# Patient Record
Sex: MALE | Race: WHITE | NOT HISPANIC OR LATINO | Employment: OTHER | URBAN - METROPOLITAN AREA
[De-identification: names, ages, dates, MRNs, and addresses within clinical notes are randomized per-mention and may not be internally consistent; named-entity substitution may affect disease eponyms.]

---

## 2017-01-10 ENCOUNTER — HOSPITAL ENCOUNTER (OUTPATIENT)
Facility: MEDICAL CENTER | Age: 82
End: 2017-01-10
Attending: UROLOGY
Payer: COMMERCIAL

## 2017-01-10 PROCEDURE — 87077 CULTURE AEROBIC IDENTIFY: CPT

## 2017-01-10 PROCEDURE — 87086 URINE CULTURE/COLONY COUNT: CPT

## 2017-01-10 PROCEDURE — 87186 SC STD MICRODIL/AGAR DIL: CPT

## 2017-01-13 LAB
BACTERIA UR CULT: ABNORMAL
SIGNIFICANT IND 70042: ABNORMAL
SOURCE SOURCE: ABNORMAL

## 2017-01-25 ENCOUNTER — HOSPITAL ENCOUNTER (OUTPATIENT)
Facility: MEDICAL CENTER | Age: 82
End: 2017-01-25
Attending: UROLOGY
Payer: COMMERCIAL

## 2017-01-25 PROCEDURE — 87077 CULTURE AEROBIC IDENTIFY: CPT

## 2017-01-25 PROCEDURE — 87086 URINE CULTURE/COLONY COUNT: CPT

## 2017-01-25 PROCEDURE — 87186 SC STD MICRODIL/AGAR DIL: CPT

## 2017-01-27 LAB
BACTERIA UR CULT: ABNORMAL
SIGNIFICANT IND 70042: ABNORMAL
SOURCE SOURCE: ABNORMAL

## 2017-04-26 ENCOUNTER — HOSPITAL ENCOUNTER (OUTPATIENT)
Facility: MEDICAL CENTER | Age: 82
End: 2017-04-26
Attending: NURSE PRACTITIONER
Payer: COMMERCIAL

## 2017-04-26 PROCEDURE — 87077 CULTURE AEROBIC IDENTIFY: CPT

## 2017-04-26 PROCEDURE — 87086 URINE CULTURE/COLONY COUNT: CPT

## 2017-04-26 PROCEDURE — 87186 SC STD MICRODIL/AGAR DIL: CPT

## 2017-04-28 LAB
BACTERIA UR CULT: ABNORMAL
BACTERIA UR CULT: ABNORMAL
SIGNIFICANT IND 70042: ABNORMAL
SOURCE SOURCE: ABNORMAL

## 2017-05-17 ENCOUNTER — HOSPITAL ENCOUNTER (OUTPATIENT)
Dept: LAB | Facility: MEDICAL CENTER | Age: 82
End: 2017-05-17
Attending: NURSE PRACTITIONER
Payer: COMMERCIAL

## 2017-05-17 PROCEDURE — 87077 CULTURE AEROBIC IDENTIFY: CPT

## 2017-05-17 PROCEDURE — 87086 URINE CULTURE/COLONY COUNT: CPT

## 2017-05-17 PROCEDURE — 87186 SC STD MICRODIL/AGAR DIL: CPT

## 2017-05-19 LAB
BACTERIA UR CULT: ABNORMAL
SIGNIFICANT IND 70042: ABNORMAL
SITE SITE: ABNORMAL
SOURCE SOURCE: ABNORMAL

## 2017-05-22 ENCOUNTER — HOSPITAL ENCOUNTER (OUTPATIENT)
Dept: LAB | Facility: MEDICAL CENTER | Age: 82
End: 2017-05-22
Attending: NURSE PRACTITIONER
Payer: COMMERCIAL

## 2017-05-22 PROCEDURE — 87086 URINE CULTURE/COLONY COUNT: CPT

## 2017-05-22 PROCEDURE — 87077 CULTURE AEROBIC IDENTIFY: CPT

## 2017-05-22 PROCEDURE — 87186 SC STD MICRODIL/AGAR DIL: CPT

## 2017-06-01 ENCOUNTER — HOSPITAL ENCOUNTER (OUTPATIENT)
Dept: LAB | Facility: MEDICAL CENTER | Age: 82
End: 2017-06-01
Attending: UROLOGY
Payer: COMMERCIAL

## 2017-06-01 PROCEDURE — 87077 CULTURE AEROBIC IDENTIFY: CPT

## 2017-06-01 PROCEDURE — 87186 SC STD MICRODIL/AGAR DIL: CPT

## 2017-06-01 PROCEDURE — 87086 URINE CULTURE/COLONY COUNT: CPT

## 2017-06-23 ENCOUNTER — HOSPITAL ENCOUNTER (OUTPATIENT)
Facility: MEDICAL CENTER | Age: 82
End: 2017-06-23
Attending: UROLOGY
Payer: COMMERCIAL

## 2017-06-23 PROCEDURE — 87077 CULTURE AEROBIC IDENTIFY: CPT

## 2017-06-23 PROCEDURE — 87186 SC STD MICRODIL/AGAR DIL: CPT

## 2017-06-23 PROCEDURE — 87086 URINE CULTURE/COLONY COUNT: CPT

## 2017-10-17 ENCOUNTER — PATIENT OUTREACH (OUTPATIENT)
Dept: HEALTH INFORMATION MANAGEMENT | Facility: OTHER | Age: 82
End: 2017-10-17

## 2017-10-17 NOTE — PROGRESS NOTES
Attempt #:2    WebIZ Checked & Epic Updated: Yes  · WebIZ Recommendations: FLU and ZOSTAVAX (Shingles)  · Is patient due for Tdap? NO  · Is patient due for Shingles? YES. Patient was not notified of copay/out of pocket cost.  HealthConnect Verified: yes  Verify PCP: yes    Communication Preference Obtained: yes     Review Care Team: yes    Annual Wellness Visit Scheduling  1. Scheduling Status:Scheduled        Care Gap Scheduling (Attempt to Schedule EACH Overdue Care Gap!)     Health Maintenance Due   Topic Date Due   • IMM ZOSTER VACCINE  10/23/1984   • IMM INFLUENZA (1) 09/01/2017        Scheduled patient for Annual Wellness Visit and Immunizations: FLU and ZOSTAVAX (Shingles)       MyChart Activation: declined  [employer] Dayami: no  Virtual Visits: no  Opt In to Text Messages: no

## 2017-10-18 ENCOUNTER — TELEPHONE (OUTPATIENT)
Dept: MEDICAL GROUP | Facility: MEDICAL CENTER | Age: 82
End: 2017-10-18

## 2017-10-18 NOTE — TELEPHONE ENCOUNTER
Future Appointments       Provider Department Center    10/25/2017 8:20 AM Tye Pan M.D.; [facility] [address] [facility]          ANNUAL WELLNESS VISIT PRE-VISIT PLANNING     1.  Reviewed note from last office visit with PCP: YES Visit date: 10/27/16    2.  If any orders were placed at last visit, do we have Results/Consult Notes?        •  Labs - Labs ordered, NOT completed. Patient advised to complete prior to next appointment.       •  Imaging - Imaging was not ordered at last office visit.        •  Referrals - No referrals were ordered at last office visit.     3.  Immunizations were updated in mVisum using WebIZ?: Yes       •  WebIZ Recommendations: FLU and ZOSTAVAX (Shingles)       •  Is patient due for Tdap? NO       •  Is patient due for Shingles? YES. Patient was notified of copay/out of pocket cost.     4.  Patient is due for the following Health Maintenance Topics:   Health Maintenance Due   Topic Date Due   • IMM ZOSTER VACCINE  10/23/1984   • IMM INFLUENZA (1) 09/01/2017       5.  Reviewed/Updated the following with patient:       •   Preferred Pharmacy? YES       •   Preferred Lab? YES       •   Preferred Communication? YES       •   Allergies? YES       •   Medications? YES. Was Abstract Encounter opened and chart updated? YES       •   Social History? YES. Was Abstract Encounter opened and chart updated? YES       •   Family History (document living status of immediate family members and if + hx of cancer, diabetes, hypertension, hyperlipidemia, heart attack, stroke) YES. Was Abstract Encounter opened and chart updated? YES    6.  Care Team Updated:       •   DME Company (gait device, O2, CPAP, etc.): YES       •   Other Specialists (eye doctor, derm, GYN, cardiology, endo, etc): YES       •   Last eye exam: a while    7. Patient has the following Care Path diagnoses on Problem List:  NONE    8.  Is patient in need of any refills prior to office visit? No    9.  DPA/Advanced Directive:  Patient does not have an Advanced Directive.  A packet and workshop information was given on Advanced Directives.     10.  Patient was advised: “This is a free wellness visit. The provider will screen for medical conditions to help you stay healthy. If you have other concerns to address you may be asked to discuss these at a separate visit or there may be an additional fee.”     11.  Patient was informed to arrive 15 min prior to their scheduled appointment and bring in their medication bottles?  YES

## 2017-10-25 ENCOUNTER — OFFICE VISIT (OUTPATIENT)
Dept: MEDICAL GROUP | Facility: MEDICAL CENTER | Age: 82
End: 2017-10-25
Payer: COMMERCIAL

## 2017-10-25 VITALS
TEMPERATURE: 97.7 F | WEIGHT: 152 LBS | RESPIRATION RATE: 16 BRPM | HEART RATE: 84 BPM | OXYGEN SATURATION: 96 % | SYSTOLIC BLOOD PRESSURE: 146 MMHG | DIASTOLIC BLOOD PRESSURE: 74 MMHG | BODY MASS INDEX: 21.76 KG/M2 | HEIGHT: 70 IN

## 2017-10-25 DIAGNOSIS — N40.1 BENIGN PROSTATIC HYPERPLASIA WITH URINARY FREQUENCY: ICD-10-CM

## 2017-10-25 DIAGNOSIS — D64.9 NORMOCYTIC ANEMIA: ICD-10-CM

## 2017-10-25 DIAGNOSIS — R35.0 BENIGN PROSTATIC HYPERPLASIA WITH URINARY FREQUENCY: ICD-10-CM

## 2017-10-25 DIAGNOSIS — I48.0 PAF (PAROXYSMAL ATRIAL FIBRILLATION) (HCC): ICD-10-CM

## 2017-10-25 DIAGNOSIS — Z86.73 HISTORY OF STROKE: ICD-10-CM

## 2017-10-25 DIAGNOSIS — Z00.00 MEDICARE ANNUAL WELLNESS VISIT, SUBSEQUENT: ICD-10-CM

## 2017-10-25 DIAGNOSIS — N63.0 BREAST SWELLING: ICD-10-CM

## 2017-10-25 DIAGNOSIS — N39.0 RECURRENT UTI: ICD-10-CM

## 2017-10-25 DIAGNOSIS — H35.30 MACULAR DEGENERATION: ICD-10-CM

## 2017-10-25 PROCEDURE — 99213 OFFICE O/P EST LOW 20 MIN: CPT | Mod: 25 | Performed by: INTERNAL MEDICINE

## 2017-10-25 PROCEDURE — G0439 PPPS, SUBSEQ VISIT: HCPCS | Mod: 25 | Performed by: INTERNAL MEDICINE

## 2017-10-25 ASSESSMENT — PATIENT HEALTH QUESTIONNAIRE - PHQ9: CLINICAL INTERPRETATION OF PHQ2 SCORE: 0

## 2017-10-25 ASSESSMENT — PAIN SCALES - GENERAL: PAINLEVEL: NO PAIN

## 2017-10-25 NOTE — PROGRESS NOTES
CC: Wellness examination complaining of breast swelling.    HPI:   Alistair presents today with the following.    1. Medicare annual wellness visit, subsequent  Screenings performed below information given on advance directives    2. Breast swelling  Does have a new complaint of right breast swelling for the last several months. No discharge and no pain. He is taking finasteride for BPH.    3. PAF (paroxysmal atrial fibrillation) (CMS-HCC)  He does have atrial fibrillation and persistently regular on his heart rate. He does have a history of a stroke and has refused any anticoagulation is no longer seeing cardiology.    4. History of stroke  He is taking aspirin daily again refuses Coumadin    5. Benign prostatic hyperplasia with urinary frequency  Followed by urology denying any new symptoms.    6. Macular degeneration  Followed by urology    7. Recurrent UTI  Now self-catheterization with decreased frequency of urinary tract infection seen by urology frequently.    8. Normocytic anemia  Overdue for blood work. Denying any blood in stool or dark tarry stool.      Depression Screening    Little interest or pleasure in doing things?  0 - not at all  Feeling down, depressed , or hopeless? 0 - not at all  Patient Health Questionnaire Score: 0     If depressive symptoms identified deferred to follow up visit unless specifically addressed in assessment and plan.    Interpretation of PHQ-9 Total Score   Score Severity   1-4 No Depression   5-9 Mild Depression   10-14 Moderate Depression   15-19 Moderately Severe Depression   20-27 Severe Depression    Screening for Cognitive Impairment    Three Minute Recall (apple, watch, flavia)  3/3    Draw clock face with all 12 numbers set to the hand to show 10 minutes past 11 o'clock  1 4/5  Cognitive concerns identified deferred for follow up unless specifically addressed in assessment and plan.    Fall Risk Assessment    Has the patient had two or more falls in the last year or any  fall with injury in the last year?  No    Safety Assessment    Throw rugs on floor.  No  Handrails on all stairs.  Yes  Good lighting in all hallways.  Yes  Difficulty hearing.  Yes  Patient counseled about all safety risks that were identified.    Functional Assessment ADLs    Are there any barriers preventing you from cooking for yourself or meeting nutritional needs?  No.    Are there any barriers preventing you from driving safely or obtaining transportation?  No.    Are there any barriers preventing you from using a telephone or calling for help?  No.    Are there any barriers preventing you from shopping?  No.    Are there any barriers preventing you from taking care of your own finances?  Yes. Wife does finaces  Are there any barriers preventing you from managing your medications?  Yes. Wife does medications  Are currently engaging any exercise or physical activity?  Yes.       Health Maintenance Summary                Annual Wellness Visit Overdue 10/23/[birth year]24     IMM INFLUENZA Overdue 9/1/2017     IMM DTaP/Tdap/Td Vaccine Next Due 8/23/2021      Done 8/23/2011 Imm Admin: Tdap Vaccine          Patient Care Team:  Tye Pan M.D. as PCP - General (Internal Medicine)  Bucky Bustamante M.D. 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     • Multiple Vitamins-Minerals ([employer] SPECTRAVITE PO) Take 1 tablet by mouth every  "day at 6 PM.     • Lactobacillus Rhamnosus, GG, ([employer]S PROBIOTIC, LACTOBACILLUS,) Cap Take 1 Cap by mouth every day at 6 PM.     • aspirin EC (ECOTRIN) 325 MG Tablet Delayed Response Take 325 mg by mouth every day.     • finasteride (PROSCAR) 5 MG Tab Take 5 mg by mouth every evening.     • CALCIUM-MAGNESIUM-ZINC PO Take 1 Tab by mouth every day.     • VITAMIN D PO Take 1 Tab by mouth every day. 1000 IU     • B Complex Vitamins (VITAMIN-B COMPLEX PO) Take 1 Tab by mouth every day.     • BETA CAROTENE PO Take 1 Tab by mouth every day. 15mg cap       No current facility-administered medications for this visit.          Allergies as of 10/25/2017 - Reviewed 10/25/2017   Allergen Reaction Noted   • Bloodless Unspecified 10/14/2009   • Coufarin [warfarin] Unspecified 05/15/2016   • Flomax [tamsulosin] Unspecified 05/15/2016   • Heparin Hives, Rash, and Itching 05/05/2015   • Lipitor [atorvastatin] Rash 04/21/2015   • Macrobid [kdc:red dye+yellow dye+nitrofurantoin+brilliant blue fcf] Nausea 04/07/2016        ROS: As per HPI.    /74   Pulse 84   Temp 36.5 °C (97.7 °F)   Resp 16   Ht 1.778 m (5' 10\")   Wt 68.9 kg (152 lb)   SpO2 96%   BMI 21.81 kg/m²     Physical Exam:  Gen:         Alert and oriented, No apparent distress.  Neck:        No Lymphadenopathy or Bruits.  Lungs:     Clear to auscultation bilaterally  CV:Irregularly irregular rhythm. No murmurs, rubs or gallops.  Abd:         Soft non tender, non distended. Normal active bowel sounds.  No  Hepatosplenomegaly, No pulsatile masses.                   Ext:          No clubbing, cyanosis, edema.      Assessment and Plan.   89 y.o. male with the following issues.    1. Medicare annual wellness visit, subsequent  Discussed healthy lifestyle habits as well as screening regimens.  - Annual Wellness Visit - Includes PPPS Subsequent ()    2. Breast swelling  Discussion today slight fullness under the areola of the right breast likely related to medication. " Suggested ultrasound patient declines will call back if it worsens.    3. PAF (paroxysmal atrial fibrillation) (CMS-HCC)  Referring back to cardiology given his history of stroke and persistent irregularities of heart rate. Denies any chest pains or shortness of breath no edema. Does not have any sensation of palpitations.  - Annual Wellness Visit - Includes PPPS Subsequent ()  - REFERRAL TO CARDIOLOGY    4. History of stroke  Continue aspirin again back to cardiology  - Annual Wellness Visit - Includes PPPS Subsequent ()    5. Benign prostatic hyperplasia with urinary frequency  Currently stable follow with urology  - Annual Wellness Visit - Includes PPPS Subsequent ()    6. Macular degeneration  Follow with ophthalmology  - Annual Wellness Visit - Includes PPPS Subsequent ()    7. Recurrent UTI  Doing much better follow along with a urologist  - Annual Wellness Visit - Includes PPPS Subsequent ()    8. Normocytic anemia  Rechecking blood work.  - Annual Wellness Visit - Includes PPPS Subsequent ()  - COMP METABOLIC PANEL; Future  - CBC WITH DIFFERENTIAL; Future

## 2017-10-26 ENCOUNTER — HOSPITAL ENCOUNTER (OUTPATIENT)
Dept: LAB | Facility: MEDICAL CENTER | Age: 82
End: 2017-10-26
Attending: INTERNAL MEDICINE
Payer: COMMERCIAL

## 2017-10-26 DIAGNOSIS — D64.9 NORMOCYTIC ANEMIA: ICD-10-CM

## 2017-10-26 LAB
ALBUMIN SERPL BCP-MCNC: 4 G/DL (ref 3.2–4.9)
ALBUMIN/GLOB SERPL: 1.4 G/DL
ALP SERPL-CCNC: 77 U/L (ref 30–99)
ALT SERPL-CCNC: 12 U/L (ref 2–50)
ANION GAP SERPL CALC-SCNC: 3 MMOL/L (ref 0–11.9)
AST SERPL-CCNC: 22 U/L (ref 12–45)
BASOPHILS # BLD AUTO: 1.3 % (ref 0–1.8)
BASOPHILS # BLD: 0.09 K/UL (ref 0–0.12)
BILIRUB SERPL-MCNC: 0.9 MG/DL (ref 0.1–1.5)
BUN SERPL-MCNC: 15 MG/DL (ref 8–22)
CALCIUM SERPL-MCNC: 9.1 MG/DL (ref 8.5–10.5)
CHLORIDE SERPL-SCNC: 106 MMOL/L (ref 96–112)
CO2 SERPL-SCNC: 29 MMOL/L (ref 20–33)
CREAT SERPL-MCNC: 0.9 MG/DL (ref 0.5–1.4)
EOSINOPHIL # BLD AUTO: 0.78 K/UL (ref 0–0.51)
EOSINOPHIL NFR BLD: 11.6 % (ref 0–6.9)
ERYTHROCYTE [DISTWIDTH] IN BLOOD BY AUTOMATED COUNT: 45.8 FL (ref 35.9–50)
GFR SERPL CREATININE-BSD FRML MDRD: >60 ML/MIN/1.73 M 2
GLOBULIN SER CALC-MCNC: 2.8 G/DL (ref 1.9–3.5)
GLUCOSE SERPL-MCNC: 93 MG/DL (ref 65–99)
HCT VFR BLD AUTO: 43.2 % (ref 42–52)
HGB BLD-MCNC: 14.1 G/DL (ref 14–18)
IMM GRANULOCYTES # BLD AUTO: 0.03 K/UL (ref 0–0.11)
IMM GRANULOCYTES NFR BLD AUTO: 0.4 % (ref 0–0.9)
LYMPHOCYTES # BLD AUTO: 1.5 K/UL (ref 1–4.8)
LYMPHOCYTES NFR BLD: 22.4 % (ref 22–41)
MCH RBC QN AUTO: 31.4 PG (ref 27–33)
MCHC RBC AUTO-ENTMCNC: 32.6 G/DL (ref 33.7–35.3)
MCV RBC AUTO: 96.2 FL (ref 81.4–97.8)
MONOCYTES # BLD AUTO: 0.89 K/UL (ref 0–0.85)
MONOCYTES NFR BLD AUTO: 13.3 % (ref 0–13.4)
NEUTROPHILS # BLD AUTO: 3.42 K/UL (ref 1.82–7.42)
NEUTROPHILS NFR BLD: 51 % (ref 44–72)
NRBC # BLD AUTO: 0 K/UL
NRBC BLD AUTO-RTO: 0 /100 WBC
PLATELET # BLD AUTO: 235 K/UL (ref 164–446)
PMV BLD AUTO: 10.5 FL (ref 9–12.9)
POTASSIUM SERPL-SCNC: 4.5 MMOL/L (ref 3.6–5.5)
PROT SERPL-MCNC: 6.8 G/DL (ref 6–8.2)
RBC # BLD AUTO: 4.49 M/UL (ref 4.7–6.1)
SODIUM SERPL-SCNC: 138 MMOL/L (ref 135–145)
WBC # BLD AUTO: 6.7 K/UL (ref 4.8–10.8)

## 2017-10-26 PROCEDURE — 85025 COMPLETE CBC W/AUTO DIFF WBC: CPT

## 2017-10-26 PROCEDURE — 36415 COLL VENOUS BLD VENIPUNCTURE: CPT

## 2017-10-26 PROCEDURE — 80053 COMPREHEN METABOLIC PANEL: CPT

## 2017-11-09 ENCOUNTER — OFFICE VISIT (OUTPATIENT)
Dept: CARDIOLOGY | Facility: MEDICAL CENTER | Age: 82
End: 2017-11-09
Payer: COMMERCIAL

## 2017-11-09 VITALS
DIASTOLIC BLOOD PRESSURE: 76 MMHG | WEIGHT: 156 LBS | HEIGHT: 70 IN | SYSTOLIC BLOOD PRESSURE: 134 MMHG | HEART RATE: 64 BPM | OXYGEN SATURATION: 95 % | BODY MASS INDEX: 22.33 KG/M2

## 2017-11-09 DIAGNOSIS — D64.9 NORMOCYTIC ANEMIA: ICD-10-CM

## 2017-11-09 DIAGNOSIS — N35.010 POST-TRAUMATIC MALE URETHRAL MEATAL STRICTURE: ICD-10-CM

## 2017-11-09 DIAGNOSIS — H35.30 MACULAR DEGENERATION: ICD-10-CM

## 2017-11-09 DIAGNOSIS — I48.0 PAF (PAROXYSMAL ATRIAL FIBRILLATION) (HCC): ICD-10-CM

## 2017-11-09 LAB — EKG IMPRESSION: NORMAL

## 2017-11-09 PROCEDURE — 93000 ELECTROCARDIOGRAM COMPLETE: CPT | Performed by: INTERNAL MEDICINE

## 2017-11-09 PROCEDURE — 99214 OFFICE O/P EST MOD 30 MIN: CPT | Performed by: INTERNAL MEDICINE

## 2017-11-09 ASSESSMENT — ENCOUNTER SYMPTOMS
PND: 0
PALPITATIONS: 0
MUSCULOSKELETAL NEGATIVE: 1
FEVER: 0
ORTHOPNEA: 0
NEUROLOGICAL NEGATIVE: 1
WEAKNESS: 0
SORE THROAT: 0
SHORTNESS OF BREATH: 0
CLAUDICATION: 0
EYES NEGATIVE: 1
RESPIRATORY NEGATIVE: 1
SPUTUM PRODUCTION: 0
DIZZINESS: 0
HEMOPTYSIS: 0
STRIDOR: 0
CONSTITUTIONAL NEGATIVE: 1
BRUISES/BLEEDS EASILY: 0
WHEEZING: 0
COUGH: 0
GASTROINTESTINAL NEGATIVE: 1
CHILLS: 0
CARDIOVASCULAR NEGATIVE: 1
LOSS OF CONSCIOUSNESS: 0

## 2017-11-09 NOTE — LETTER
[facility]   [address], [address]  [place] [address]  Phone: [phone]  Fax: [phone]              Alistair Tavares  10/23/1924    Encounter Date: 11/9/2017    Americo Mckeon M.D.          PROGRESS NOTE:  Subjective:   Alistair Tavares is a 89 y.o. male who presents today as a new consultation. He is a patient well-known to our practice but has not been here in greater than 2 years. He has a past mental history of paroxysmal atrial fibrillation and has declined anticoagulation in the past. He was present flexion is no longer taking that. He otherwise has no functional limitations and walks per day with his significant other. He has no functional limitations.    Past Medical History:   Diagnosis Date   • Angina 2005    episode of atrial fibrillation   • Arrhythmia     History of afib, no longer on medication   • CAD (coronary artery disease)     paroximal atrial tachycardia   • CATARACT     asim iol   • Dental disorder     lower dentures   • Macular degeneration of both eyes    • Pneumonia 2008    Not hospitalized for it   • Renal disorder 8/4/2012    pyelonephritis   • Stroke (CMS-[facility]) 2015    pt reports no residual weakness   • Unspecified urinary incontinence     dribbles     Past Surgical History:   Procedure Laterality Date   • CYSTOSCOPY  6/13/2016    Procedure: CYSTOSCOPY 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;  Surgeon: Bucky Bustamante M.D.;  Location: SURGERY [facility];  Service:    • CATARACT PHACO WITH IOL  10/28/2009    Performed by PAULA COE at SURGERY SAME DAY [facility] ORS   • CATARACT PHACO WITH IOL  10/14/2009    Performed by PAULA COE at SURGERY SAME DAY [facility] ORS   • OTHER ORTHOPEDIC SURGERY      PATELLA RIGHT     Family History   Problem Relation Age of Onset   • Lung Disease Father      emphysima   • Arthritis Neg Hx      History   Smoking Status   • Never Smoker   Smokeless Tobacco   •  "Never Used     Allergies   Allergen Reactions   • Bloodless Unspecified     Pt states he is willing to accept blood/blood products as \"a last resort\"   • Coufarin [Warfarin] Unspecified     Pts wife states \"He just does not like that drug\".   • Flomax [Tamsulosin] Unspecified     Pts wife states \"His BP goes really low and he fell down\"   • Heparin Hives, Rash and Itching     Pts wife states \"Rash all over body\".   • Lipitor [Atorvastatin] Rash     Pts wife states \"Rash all over body\".   • Macrobid [Kdc:Red Dye+Yellow Dye+Nitrofurantoin+Brilliant Blue Fcf] Nausea     Pt's wife states \"Makes pt weak and nausea\".     Outpatient Encounter Prescriptions as of 11/9/2017   Medication Sig Dispense Refill   • Cyanocobalamin (VITAMIN B-12 PO) Take  by mouth.     • Multiple Vitamins-Minerals (CVS SPECTRAVITE PO) Take 1 tablet by mouth every day at 6 PM.     • Lactobacillus Rhamnosus, GG, (CVS PROBIOTIC, LACTOBACILLUS,) Cap Take 1 Cap by mouth every day at 6 PM.     • aspirin EC (ECOTRIN) 325 MG Tablet Delayed Response Take 325 mg by mouth every day.     • finasteride (PROSCAR) 5 MG Tab Take 5 mg by mouth every evening.     • CALCIUM-MAGNESIUM-ZINC PO Take 1 Tab by mouth every day.     • VITAMIN D PO Take 1 Tab by mouth every day. 1000 IU     • B Complex Vitamins (VITAMIN-B COMPLEX PO) Take 1 Tab by mouth every day.     • [DISCONTINUED] BETA CAROTENE PO Take 1 Tab by mouth every day. 15mg cap       No facility-administered encounter medications on file as of 11/9/2017.      Review of Systems   Constitutional: Negative.  Negative for chills, fever and malaise/fatigue.   HENT: Negative.  Negative for sore throat.    Eyes: Negative.    Respiratory: Negative.  Negative for cough, hemoptysis, sputum production, shortness of breath, wheezing and stridor.    Cardiovascular: Negative.  Negative for chest pain, palpitations, orthopnea, claudication, leg swelling and PND.   Gastrointestinal: Negative.    Genitourinary: Negative.    " "  Musculoskeletal: Negative.    Skin: Negative.    Neurological: Negative.  Negative for dizziness, loss of consciousness and weakness.   Endo/Heme/Allergies: Negative.  Does not bruise/bleed easily.   All other systems reviewed and are negative.       Objective:   /76   Pulse 64   Ht 1.778 m (5' 10\")   Wt 70.8 kg (156 lb)   SpO2 95%   BMI 22.38 kg/m²      Physical Exam   Constitutional: He is oriented to person, place, and time. He appears well-developed and well-nourished. No distress.   HENT:   Head: Normocephalic.   Mouth/Throat: Oropharynx is clear and moist.   Eyes: EOM are normal. Pupils are equal, round, and reactive to light. Right eye exhibits no discharge. Left eye exhibits no discharge. No scleral icterus.   Neck: Normal range of motion. Neck supple. No JVD present. No tracheal deviation present.   Cardiovascular: Normal rate, regular rhythm, S1 normal, S2 normal, normal heart sounds, intact distal pulses and normal pulses.  Exam reveals no gallop, no S3, no S4 and no friction rub.    No murmur heard.   No systolic murmur is present    No diastolic murmur is present   Pulses:       Carotid pulses are 2+ on the right side, and 2+ on the left side.       Radial pulses are 2+ on the right side, and 2+ on the left side.        Dorsalis pedis pulses are 2+ on the right side, and 2+ on the left side.        Posterior tibial pulses are 2+ on the right side, and 2+ on the left side.   Pulmonary/Chest: Effort normal and breath sounds normal. No respiratory distress. He has no wheezes. He has no rales.   Abdominal: Soft. Bowel sounds are normal. He exhibits no distension and no mass. There is no tenderness. There is no rebound and no guarding.   Musculoskeletal: He exhibits no edema.   Neurological: He is alert and oriented to person, place, and time. No cranial nerve deficit.   Skin: Skin is warm and dry. He is not diaphoretic. No pallor.   Psychiatric: He has a normal mood and affect. His behavior is " normal. Judgment and thought content normal.   Nursing note and vitals reviewed.      Assessment:     1. Macular degeneration     2. Normocytic anemia  [facility] EKG (Clinic Performed)   3. PAF (paroxysmal atrial fibrillation) (CMS-[facility])  [facility] EKG (Clinic Performed)   4. 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 His EKG did today in clinic verify this. I think this is likely  Not reversible at this point. I discussed with him the role of oral anticoagulation. He again declines this today. His estimated risk of stroke is approximate 7-10% per year with an annual risk of bleeding of about 1-2%. These risks were explained to him and he again declines anticoagulation.    Thank for you allowing me to take part in your patient's care, please call should you have any questions or would like to discuss this patient.      Tye Pan M.D.  [address] [place] [id number]  VIA In Basket

## 2017-11-09 NOTE — PROGRESS NOTES
"Subjective:   Alistair Tavares is a 89 y.o. male who presents today as a new consultation. He is a patient well-known to our practice but has not been here in greater than 2 years. He has a past mental history of paroxysmal atrial fibrillation and has declined anticoagulation in the past. He was present flexion is no longer taking that. He otherwise has no functional limitations and walks per day with his significant other. He has no functional limitations.    Past Medical History:   Diagnosis Date   • Angina 2005    episode of atrial fibrillation   • Arrhythmia     History of afib, no longer on medication   • CAD (coronary artery disease)     paroximal atrial tachycardia   • CATARACT     asim iol   • Dental disorder     lower dentures   • Macular degeneration of both eyes    • Pneumonia 2008    Not hospitalized for it   • Renal disorder 8/4/2012    pyelonephritis   • Stroke (CMS-[facility]) 2015    pt reports no residual weakness   • Unspecified urinary incontinence     dribbles     Past Surgical History:   Procedure Laterality Date   • CYSTOSCOPY  6/13/2016    Procedure: CYSTOSCOPY 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;  Surgeon: Bucky Bustamante M.D.;  Location: SURGERY [facility];  Service:    • CATARACT PHACO WITH IOL  10/28/2009    Performed by PAULA COE at SURGERY SAME DAY [facility] ORS   • CATARACT PHACO WITH IOL  10/14/2009    Performed by PAULA COE at SURGERY SAME DAY [facility] ORS   • OTHER ORTHOPEDIC SURGERY      PATELLA RIGHT     Family History   Problem Relation Age of Onset   • Lung Disease Father      emphysima   • Arthritis Neg Hx      History   Smoking Status   • Never Smoker   Smokeless Tobacco   • Never Used     Allergies   Allergen Reactions   • Bloodless Unspecified     Pt states he is willing to accept blood/blood products as \"a last resort\"   • Coufarin [Warfarin] Unspecified     Pts wife states \"He just does not like that drug\".   • Flomax [Tamsulosin] " "Unspecified     Pts wife states \"His BP goes really low and he fell down\"   • Heparin Hives, Rash and Itching     Pts wife states \"Rash all over body\".   • Lipitor [Atorvastatin] Rash     Pts wife states \"Rash all over body\".   • Macrobid [Kdc:Red Dye+Yellow Dye+Nitrofurantoin+Brilliant Blue Fcf] Nausea     Pt's wife states \"Makes pt weak and nausea\".     Outpatient Encounter Prescriptions as of 11/9/2017   Medication Sig Dispense Refill   • Cyanocobalamin (VITAMIN B-12 PO) Take  by mouth.     • Multiple Vitamins-Minerals ([employer] SPECTRAVITE PO) Take 1 tablet by mouth every day at 6 PM.     • Lactobacillus Rhamnosus, GG, (CVS PROBIOTIC, LACTOBACILLUS,) Cap Take 1 Cap by mouth every day at 6 PM.     • aspirin EC (ECOTRIN) 325 MG Tablet Delayed Response Take 325 mg by mouth every day.     • finasteride (PROSCAR) 5 MG Tab Take 5 mg by mouth every evening.     • CALCIUM-MAGNESIUM-ZINC PO Take 1 Tab by mouth every day.     • VITAMIN D PO Take 1 Tab by mouth every day. 1000 IU     • B Complex Vitamins (VITAMIN-B COMPLEX PO) Take 1 Tab by mouth every day.     • [DISCONTINUED] BETA CAROTENE PO Take 1 Tab by mouth every day. 15mg cap       No facility-administered encounter medications on file as of 11/9/2017.      Review of Systems   Constitutional: Negative.  Negative for chills, fever and malaise/fatigue.   HENT: Negative.  Negative for sore throat.    Eyes: Negative.    Respiratory: Negative.  Negative for cough, hemoptysis, sputum production, shortness of breath, wheezing and stridor.    Cardiovascular: Negative.  Negative for chest pain, palpitations, orthopnea, claudication, leg swelling and PND.   Gastrointestinal: Negative.    Genitourinary: Negative.    Musculoskeletal: Negative.    Skin: Negative.    Neurological: Negative.  Negative for dizziness, loss of consciousness and weakness.   Endo/Heme/Allergies: Negative.  Does not bruise/bleed easily.   All other systems reviewed and are negative.       Objective:   BP " "134/76   Pulse 64   Ht 1.778 m (5' 10\")   Wt 70.8 kg (156 lb)   SpO2 95%   BMI 22.38 kg/m²     Physical Exam   Constitutional: He is oriented to person, place, and time. He appears well-developed and well-nourished. No distress.   HENT:   Head: Normocephalic.   Mouth/Throat: Oropharynx is clear and moist.   Eyes: EOM are normal. Pupils are equal, round, and reactive to light. Right eye exhibits no discharge. Left eye exhibits no discharge. No scleral icterus.   Neck: Normal range of motion. Neck supple. No JVD present. No tracheal deviation present.   Cardiovascular: Normal rate, regular rhythm, S1 normal, S2 normal, normal heart sounds, intact distal pulses and normal pulses.  Exam reveals no gallop, no S3, no S4 and no friction rub.    No murmur heard.   No systolic murmur is present    No diastolic murmur is present   Pulses:       Carotid pulses are 2+ on the right side, and 2+ on the left side.       Radial pulses are 2+ on the right side, and 2+ on the left side.        Dorsalis pedis pulses are 2+ on the right side, and 2+ on the left side.        Posterior tibial pulses are 2+ on the right side, and 2+ on the left side.   Pulmonary/Chest: Effort normal and breath sounds normal. No respiratory distress. He has no wheezes. He has no rales.   Abdominal: Soft. Bowel sounds are normal. He exhibits no distension and no mass. There is no tenderness. There is no rebound and no guarding.   Musculoskeletal: He exhibits no edema.   Neurological: He is alert and oriented to person, place, and time. No cranial nerve deficit.   Skin: Skin is warm and dry. He is not diaphoretic. No pallor.   Psychiatric: He has a normal mood and affect. His behavior is normal. Judgment and thought content normal.   Nursing note and vitals reviewed.      Assessment:     1. Macular degeneration     2. Normocytic anemia  [facility] EKG (Clinic Performed)   3. PAF (paroxysmal atrial fibrillation) (CMS-HCC)  [facility] EKG (Clinic " Performed)   4. 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 His EKG did today in clinic verify this. I think this is likely  Not reversible at this point. I discussed with him the role of oral anticoagulation. He again declines this today. His estimated risk of stroke is approximate 7-10% per year with an annual risk of bleeding of about 1-2%. These risks were explained to him and he again declines anticoagulation.    Thank for you allowing me to take part in your patient's care, please call should you have any questions or would like to discuss this patient.

## 2018-02-08 ENCOUNTER — OFFICE VISIT (OUTPATIENT)
Dept: CARDIOLOGY | Facility: MEDICAL CENTER | Age: 83
End: 2018-02-08
Payer: COMMERCIAL

## 2018-02-08 VITALS
HEIGHT: 72 IN | WEIGHT: 153 LBS | BODY MASS INDEX: 20.72 KG/M2 | HEART RATE: 68 BPM | SYSTOLIC BLOOD PRESSURE: 116 MMHG | DIASTOLIC BLOOD PRESSURE: 74 MMHG | OXYGEN SATURATION: 95 %

## 2018-02-08 DIAGNOSIS — N35.010 POST-TRAUMATIC MALE URETHRAL MEATAL STRICTURE: ICD-10-CM

## 2018-02-08 DIAGNOSIS — Z86.73 HISTORY OF STROKE: ICD-10-CM

## 2018-02-08 DIAGNOSIS — I48.0 PAF (PAROXYSMAL ATRIAL FIBRILLATION) (HCC): ICD-10-CM

## 2018-02-08 PROCEDURE — 99214 OFFICE O/P EST MOD 30 MIN: CPT | Performed by: INTERNAL MEDICINE

## 2018-02-08 ASSESSMENT — ENCOUNTER SYMPTOMS
CONSTITUTIONAL NEGATIVE: 1
MUSCULOSKELETAL NEGATIVE: 1
HEMOPTYSIS: 0
WEAKNESS: 0
NEUROLOGICAL NEGATIVE: 1
SPUTUM PRODUCTION: 0
PALPITATIONS: 0
LOSS OF CONSCIOUSNESS: 0
DIZZINESS: 0
CHILLS: 0
COUGH: 0
GASTROINTESTINAL NEGATIVE: 1
CLAUDICATION: 0
BRUISES/BLEEDS EASILY: 0
FEVER: 0
WHEEZING: 0
STRIDOR: 0
PND: 0
EYES NEGATIVE: 1
SORE THROAT: 0
SHORTNESS OF BREATH: 0
CARDIOVASCULAR NEGATIVE: 1
RESPIRATORY NEGATIVE: 1
ORTHOPNEA: 0

## 2018-02-08 NOTE — LETTER
[facility]   [address], [facility] [address]  [place] [address]  Phone: [phone]  Fax: [phone]              Alistair Tavares  10/23/1924    Encounter Date: 2/8/2018    Americo Mckeon M.D.          PROGRESS NOTE:  Subjective:   Alistair Tavares is a 89 y.o. male who presents today as a follow-up for his paroxysmal atrial fibrillation. He continues to decline anticoagulation. He does take a daily aspirin. He is asymptomatic with his atrial fibrillation having no chest pain palpitations PND or orthopnea. His wife is concerned that occasionally he does wake up short of breath but that's on the order of about once per month.      Past Medical History:   Diagnosis Date   • Angina 2005    episode of atrial fibrillation   • Arrhythmia     History of afib, no longer on medication   • CAD (coronary artery disease)     paroximal atrial tachycardia   • CATARACT     asim iol   • Dental disorder     lower dentures   • Macular degeneration of both eyes    • Pneumonia 2008    Not hospitalized for it   • Renal disorder 8/4/2012    pyelonephritis   • Stroke (CMS-[facility]) 2015    pt reports no residual weakness   • Unspecified urinary incontinence     dribbles     Past Surgical History:   Procedure Laterality Date   • CYSTOSCOPY  6/13/2016    Procedure: CYSTOSCOPY 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;  Surgeon: Bucky Bustamante M.D.;  Location: SURGERY [facility];  Service:    • CATARACT PHACO WITH IOL  10/28/2009    Performed by PAULA COE at SURGERY SAME DAY [facility] ORS   • CATARACT PHACO WITH IOL  10/14/2009    Performed by PAULA COE at SURGERY SAME DAY [facility] ORS   • OTHER ORTHOPEDIC SURGERY      PATELLA RIGHT     Family History   Problem Relation Age of Onset   • Lung Disease Father      emphysima   • Arthritis Neg Hx      History   Smoking Status   • Never Smoker   Smokeless Tobacco   • Never Used     Allergies   Allergen Reactions   •  "Bloodless Unspecified     Pt states he is willing to accept blood/blood products as \"a last resort\"   • Coufarin [Warfarin] Unspecified     Pts wife states \"He just does not like that drug\".   • Flomax [Tamsulosin] Unspecified     Pts wife states \"His BP goes really low and he fell down\"   • Heparin Hives, Rash and Itching     Pts wife states \"Rash all over body\".   • Lipitor [Atorvastatin] Rash     Pts wife states \"Rash all over body\".   • Macrobid [Kdc:Red Dye+Yellow Dye+Nitrofurantoin+Brilliant Blue Fcf] Nausea     Pt's wife states \"Makes pt weak and nausea\".     Outpatient Encounter Prescriptions as of 2/8/2018   Medication Sig Dispense Refill   • Cyanocobalamin (VITAMIN B-12 PO) Take  by mouth.     • Multiple Vitamins-Minerals (CVS SPECTRAVITE PO) Take 1 tablet by mouth every day at 6 PM.     • Lactobacillus Rhamnosus, GG, (CVS PROBIOTIC, LACTOBACILLUS,) Cap Take 1 Cap by mouth every day at 6 PM.     • aspirin EC (ECOTRIN) 325 MG Tablet Delayed Response Take 325 mg by mouth every day.     • finasteride (PROSCAR) 5 MG Tab Take 5 mg by mouth every evening.     • CALCIUM-MAGNESIUM-ZINC PO Take 1 Tab by mouth every day.     • VITAMIN D PO Take 1 Tab by mouth every day. 1000 IU     • B Complex Vitamins (VITAMIN-B COMPLEX PO) Take 1 Tab by mouth every day.       No facility-administered encounter medications on file as of 2/8/2018.      Review of Systems   Constitutional: Negative.  Negative for chills, fever and malaise/fatigue.   HENT: Negative.  Negative for sore throat.    Eyes: Negative.    Respiratory: Negative.  Negative for cough, hemoptysis, sputum production, shortness of breath, wheezing and stridor.    Cardiovascular: Negative.  Negative for chest pain, palpitations, orthopnea, claudication, leg swelling and PND.   Gastrointestinal: Negative.    Genitourinary: Negative.    Musculoskeletal: Negative.    Skin: Negative.    Neurological: Negative.  Negative for dizziness, loss of consciousness and weakness.  "   Endo/Heme/Allergies: Negative.  Does not bruise/bleed easily.   All other systems reviewed and are negative.       Objective:   /74   Pulse 68   Ht 1.829 m (6')   Wt 69.4 kg (153 lb)   SpO2 95%   BMI 20.75 kg/m²      Physical Exam   Constitutional: He is oriented to person, place, and time. He appears well-developed and well-nourished. No distress.   HENT:   Head: Normocephalic.   Mouth/Throat: Oropharynx is clear and moist.   Eyes: EOM are normal. Pupils are equal, round, and reactive to light. Right eye exhibits no discharge. Left eye exhibits no discharge. No scleral icterus.   Neck: Normal range of motion. Neck supple. No JVD present. No tracheal deviation present.   Cardiovascular: Normal rate, regular rhythm, S1 normal, S2 normal, normal heart sounds, intact distal pulses and normal pulses.  Exam reveals no gallop, no S3, no S4 and no friction rub.    No murmur heard.   No systolic murmur is present    No diastolic murmur is present   Pulses:       Carotid pulses are 2+ on the right side, and 2+ on the left side.       Radial pulses are 2+ on the right side, and 2+ on the left side.        Dorsalis pedis pulses are 2+ on the right side, and 2+ on the left side.        Posterior tibial pulses are 2+ on the right side, and 2+ on the left side.   Pulmonary/Chest: Effort normal and breath sounds normal. No respiratory distress. He has no wheezes. He has no rales.   Abdominal: Soft. Bowel sounds are normal. He exhibits no distension and no mass. There is no tenderness. There is no rebound and no guarding.   Musculoskeletal: He exhibits no edema.   Neurological: He is alert and oriented to person, place, and time. No cranial nerve deficit.   Skin: Skin is warm and dry. He is not diaphoretic. No pallor.   Psychiatric: He has a normal mood and affect. His behavior is normal. Judgment and thought content normal.   Nursing note and vitals reviewed.      Assessment:     1. PAF (paroxysmal atrial  fibrillation) (CMS-[facility])     2. 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 History of stroke         Medical Decision Making:  Today's Assessment / Status / Plan:     89-year-old male with paroxysmal atrial fibrillation doing well. Again declines anticoagulation. No changes to his medical therapy. We will see him back in 6 months.    Thank for you allowing me to take part in your patient's care, please call should you have any questions or would like to discuss this patient.      Tye Pan M.D.  [address] [place] [id number]  VIA In Basket

## 2018-02-08 NOTE — PROGRESS NOTES
"Subjective:   Alistair Tavares is a 89 y.o. male who presents today as a follow-up for his paroxysmal atrial fibrillation. He continues to decline anticoagulation. He does take a daily aspirin. He is asymptomatic with his atrial fibrillation having no chest pain palpitations PND or orthopnea. His wife is concerned that occasionally he does wake up short of breath but that's on the order of about once per month.      Past Medical History:   Diagnosis Date   • Angina 2005    episode of atrial fibrillation   • Arrhythmia     History of afib, no longer on medication   • CAD (coronary artery disease)     paroximal atrial tachycardia   • CATARACT     asim iol   • Dental disorder     lower dentures   • Macular degeneration of both eyes    • Pneumonia 2008    Not hospitalized for it   • Renal disorder 8/4/2012    pyelonephritis   • Stroke (CMS-[facility]) 2015    pt reports no residual weakness   • Unspecified urinary incontinence     dribbles     Past Surgical History:   Procedure Laterality Date   • CYSTOSCOPY  6/13/2016    Procedure: CYSTOSCOPY 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;  Surgeon: Bucky Bustamante M.D.;  Location: SURGERY [facility];  Service:    • CATARACT PHACO WITH IOL  10/28/2009    Performed by PAULA COE at SURGERY SAME DAY [facility] ORS   • CATARACT PHACO WITH IOL  10/14/2009    Performed by PAULA COE at SURGERY SAME DAY [facility] ORS   • OTHER ORTHOPEDIC SURGERY      PATELLA RIGHT     Family History   Problem Relation Age of Onset   • Lung Disease Father      emphysima   • Arthritis Neg Hx      History   Smoking Status   • Never Smoker   Smokeless Tobacco   • Never Used     Allergies   Allergen Reactions   • Bloodless Unspecified     Pt states he is willing to accept blood/blood products as \"a last resort\"   • Coufarin [Warfarin] Unspecified     Pts wife states \"He just does not like that drug\".   • Flomax [Tamsulosin] Unspecified     Pts wife states \"His BP goes really low " "and he fell down\"   • Heparin Hives, Rash and Itching     Pts wife states \"Rash all over body\".   • Lipitor [Atorvastatin] Rash     Pts wife states \"Rash all over body\".   • Macrobid [Kdc:Red Dye+Yellow Dye+Nitrofurantoin+Brilliant Blue Fcf] Nausea     Pt's wife states \"Makes pt weak and nausea\".     Outpatient Encounter Prescriptions as of 2/8/2018   Medication Sig Dispense Refill   • Cyanocobalamin (VITAMIN B-12 PO) Take  by mouth.     • Multiple Vitamins-Minerals (CVS SPECTRAVITE PO) Take 1 tablet by mouth every day at 6 PM.     • Lactobacillus Rhamnosus, GG, (CVS PROBIOTIC, LACTOBACILLUS,) Cap Take 1 Cap by mouth every day at 6 PM.     • aspirin EC (ECOTRIN) 325 MG Tablet Delayed Response Take 325 mg by mouth every day.     • finasteride (PROSCAR) 5 MG Tab Take 5 mg by mouth every evening.     • CALCIUM-MAGNESIUM-ZINC PO Take 1 Tab by mouth every day.     • VITAMIN D PO Take 1 Tab by mouth every day. 1000 IU     • B Complex Vitamins (VITAMIN-B COMPLEX PO) Take 1 Tab by mouth every day.       No facility-administered encounter medications on file as of 2/8/2018.      Review of Systems   Constitutional: Negative.  Negative for chills, fever and malaise/fatigue.   HENT: Negative.  Negative for sore throat.    Eyes: Negative.    Respiratory: Negative.  Negative for cough, hemoptysis, sputum production, shortness of breath, wheezing and stridor.    Cardiovascular: Negative.  Negative for chest pain, palpitations, orthopnea, claudication, leg swelling and PND.   Gastrointestinal: Negative.    Genitourinary: Negative.    Musculoskeletal: Negative.    Skin: Negative.    Neurological: Negative.  Negative for dizziness, loss of consciousness and weakness.   Endo/Heme/Allergies: Negative.  Does not bruise/bleed easily.   All other systems reviewed and are negative.       Objective:   /74   Pulse 68   Ht 1.829 m (6')   Wt 69.4 kg (153 lb)   SpO2 95%   BMI 20.75 kg/m²     Physical Exam   Constitutional: He is " oriented to person, place, and time. He appears well-developed and well-nourished. No distress.   HENT:   Head: Normocephalic.   Mouth/Throat: Oropharynx is clear and moist.   Eyes: EOM are normal. Pupils are equal, round, and reactive to light. Right eye exhibits no discharge. Left eye exhibits no discharge. No scleral icterus.   Neck: Normal range of motion. Neck supple. No JVD present. No tracheal deviation present.   Cardiovascular: Normal rate, regular rhythm, S1 normal, S2 normal, normal heart sounds, intact distal pulses and normal pulses.  Exam reveals no gallop, no S3, no S4 and no friction rub.    No murmur heard.   No systolic murmur is present    No diastolic murmur is present   Pulses:       Carotid pulses are 2+ on the right side, and 2+ on the left side.       Radial pulses are 2+ on the right side, and 2+ on the left side.        Dorsalis pedis pulses are 2+ on the right side, and 2+ on the left side.        Posterior tibial pulses are 2+ on the right side, and 2+ on the left side.   Pulmonary/Chest: Effort normal and breath sounds normal. No respiratory distress. He has no wheezes. He has no rales.   Abdominal: Soft. Bowel sounds are normal. He exhibits no distension and no mass. There is no tenderness. There is no rebound and no guarding.   Musculoskeletal: He exhibits no edema.   Neurological: He is alert and oriented to person, place, and time. No cranial nerve deficit.   Skin: Skin is warm and dry. He is not diaphoretic. No pallor.   Psychiatric: He has a normal mood and affect. His behavior is normal. Judgment and thought content normal.   Nursing note and vitals reviewed.      Assessment:     1. PAF (paroxysmal atrial fibrillation) (CMS-HCC)     2. 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 History of stroke         Medical Decision Making:  Today's Assessment / Status / Plan:     89-year-old male with paroxysmal atrial fibrillation doing well. Again declines anticoagulation. No  changes to his medical therapy. We will see him back in 6 months.    Thank for you allowing me to take part in your patient's care, please call should you have any questions or would like to discuss this patient.

## 2018-08-01 ENCOUNTER — OFFICE VISIT (OUTPATIENT)
Dept: CARDIOLOGY | Facility: MEDICAL CENTER | Age: 83
End: 2018-08-01
Payer: COMMERCIAL

## 2018-08-01 VITALS
SYSTOLIC BLOOD PRESSURE: 110 MMHG | OXYGEN SATURATION: 98 % | HEIGHT: 70 IN | DIASTOLIC BLOOD PRESSURE: 82 MMHG | WEIGHT: 151 LBS | HEART RATE: 72 BPM | BODY MASS INDEX: 21.62 KG/M2

## 2018-08-01 DIAGNOSIS — I48.0 PAF (PAROXYSMAL ATRIAL FIBRILLATION) (HCC): ICD-10-CM

## 2018-08-01 DIAGNOSIS — D64.9 NORMOCYTIC ANEMIA: ICD-10-CM

## 2018-08-01 DIAGNOSIS — Z86.73 HISTORY OF STROKE: ICD-10-CM

## 2018-08-01 PROCEDURE — 99214 OFFICE O/P EST MOD 30 MIN: CPT | Performed by: INTERNAL MEDICINE

## 2018-08-01 ASSESSMENT — ENCOUNTER SYMPTOMS
RESPIRATORY NEGATIVE: 1
BRUISES/BLEEDS EASILY: 0
PALPITATIONS: 0
NEUROLOGICAL NEGATIVE: 1
CARDIOVASCULAR NEGATIVE: 1
GASTROINTESTINAL NEGATIVE: 1
COUGH: 0
ORTHOPNEA: 0
STRIDOR: 0
CLAUDICATION: 0
DIZZINESS: 0
LOSS OF CONSCIOUSNESS: 0
PND: 0
WEAKNESS: 0
CHILLS: 0
SPUTUM PRODUCTION: 0
HEMOPTYSIS: 0
CONSTITUTIONAL NEGATIVE: 1
MUSCULOSKELETAL NEGATIVE: 1
FEVER: 0
WHEEZING: 0
SHORTNESS OF BREATH: 0
EYES NEGATIVE: 1
SORE THROAT: 0

## 2018-08-01 NOTE — LETTER
[facility]   [address] [place] [address] 400  [place] [address]  Phone: [phone]  Fax: [phone]              Alistair Tavares  10/23/1924    Encounter Date: 8/1/2018    Americo Mckeon M.D.          PROGRESS NOTE:  Chief Complaint   Patient presents with   • Atrial Fibrillation       Subjective:   Alistair Tavares is a 89 y.o. male who presents today as a follow-up for his paroxysmal atrial fibrillation.  Since he was last seen he continues to be off anticoagulation and declined anticoagulation.  He has had no palpitations PND orthopnea.  His blood pressure is controlled.  He has no lower extremity edema.  He has been having no chest pain.    Past Medical History:   Diagnosis Date   • Angina 2005    episode of atrial fibrillation   • Arrhythmia     History of afib, no longer on medication   • CAD (coronary artery disease)     paroximal atrial tachycardia   • CATARACT     asim iol   • Dental disorder     lower dentures   • Macular degeneration of both eyes    • Pneumonia 2008    Not hospitalized for it   • Renal disorder 8/4/2012    pyelonephritis   • Stroke (HCC) 2015    pt reports no residual weakness   • Unspecified urinary incontinence     dribbles     Past Surgical History:   Procedure Laterality Date   • CYSTOSCOPY  6/13/2016    Procedure: CYSTOSCOPY 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;  Surgeon: Bucky Bustamante M.D.;  Location: SURGERY [facility];  Service:    • CATARACT PHACO WITH IOL  10/28/2009    Performed by PAULA COE at SURGERY SAME DAY [facility] ORS   • CATARACT PHACO WITH IOL  10/14/2009    Performed by PAULA COE at SURGERY SAME DAY [facility] ORS   • OTHER ORTHOPEDIC SURGERY      PATELLA RIGHT     Family History   Problem Relation Age of Onset   • Lung Disease Father         emphysima   • Arthritis Neg Hx      Social History     Social History   • Marital status:      Spouse name: N/A   • Number of  "children: N/A   • Years of education: N/A     Occupational History   • Not on file.     Social History Main Topics   • Smoking status: Never Smoker   • Smokeless tobacco: Never Used   • Alcohol use 1.8 - 5.4 oz/week     3 Standard drinks or equivalent per week   • Drug use: No   • Sexual activity: No     Other Topics Concern   • Not on file     Social History Narrative   • No narrative on file     Allergies   Allergen Reactions   • Bloodless Unspecified     Pt states he is willing to accept blood/blood products as \"a last resort\"   • Coufarin [Warfarin] Unspecified     Pts wife states \"He just does not like that drug\".   • Flomax [Tamsulosin] Unspecified     Pts wife states \"His BP goes really low and he fell down\"   • Heparin Hives, Rash and Itching     Pts wife states \"Rash all over body\".   • Lipitor [Atorvastatin] Rash     Pts wife states \"Rash all over body\".   • Macrobid [Kdc:Red Dye+Yellow Dye+Nitrofurantoin+Brilliant Blue Fcf] Nausea     Pt's wife states \"Makes pt weak and nausea\".     Outpatient Encounter Prescriptions as of 8/1/2018   Medication Sig Dispense Refill   • Cyanocobalamin (VITAMIN B-12 PO) Take  by mouth.     • Multiple Vitamins-Minerals ([employer]S SPECTRAVITE PO) Take 1 tablet by mouth every day at 6 PM.     • aspirin EC (ECOTRIN) 325 MG Tablet Delayed Response Take 325 mg by mouth every day.     • finasteride (PROSCAR) 5 MG Tab Take 5 mg by mouth every evening.     • CALCIUM-MAGNESIUM-ZINC PO Take 1 Tab by mouth every day.     • VITAMIN D PO Take 1 Tab by mouth every day. 1000 IU     • B Complex Vitamins (VITAMIN-B COMPLEX PO) Take 1 Tab by mouth every day.     • Lactobacillus Rhamnosus, GG, (CVS PROBIOTIC, LACTOBACILLUS,) Cap Take 1 Cap by mouth every day at 6 PM.       No facility-administered encounter medications on file as of 8/1/2018.      Review of Systems   Constitutional: Negative.  Negative for chills, fever and malaise/fatigue.   HENT: Negative.  Negative for sore throat.    Eyes: " "Negative.    Respiratory: Negative.  Negative for cough, hemoptysis, sputum production, shortness of breath, wheezing and stridor.    Cardiovascular: Negative.  Negative for chest pain, palpitations, orthopnea, claudication, leg swelling and PND.   Gastrointestinal: Negative.    Genitourinary: Negative.    Musculoskeletal: Negative.    Skin: Negative.    Neurological: Negative.  Negative for dizziness, loss of consciousness and weakness.   Endo/Heme/Allergies: Negative.  Does not bruise/bleed easily.   All other systems reviewed and are negative.       Objective:   /82   Pulse 72   Ht 1.778 m (5' 10\")   Wt 68.5 kg (151 lb)   SpO2 98%   BMI 21.67 kg/m²      Physical Exam   Constitutional: He appears well-developed and well-nourished. No distress.   HENT:   Head: Normocephalic and atraumatic.   Right Ear: External ear normal.   Left Ear: External ear normal.   Nose: Nose normal.   Mouth/Throat: No oropharyngeal exudate.   Eyes: Pupils are equal, round, and reactive to light. Conjunctivae and EOM are normal. Right eye exhibits no discharge. Left eye exhibits no discharge. No scleral icterus.   Neck: Neck supple. No JVD present.   Cardiovascular: Normal rate, regular rhythm and intact distal pulses.  Exam reveals no gallop and no friction rub.    No murmur heard.  Pulmonary/Chest: Effort normal. No stridor. No respiratory distress. He has no wheezes. He has no rales. He exhibits no tenderness.   Abdominal: Soft. He exhibits no distension. There is no guarding.   Musculoskeletal: Normal range of motion. He exhibits no edema, tenderness or deformity.   Neurological: He is alert. He has normal reflexes. He displays normal reflexes. No cranial nerve deficit. He exhibits normal muscle tone. Coordination normal.   Skin: Skin is warm and dry. No rash noted. He is not diaphoretic. No erythema. No pallor.   Psychiatric: He has a normal mood and affect. His behavior is normal. Judgment and thought content normal.   "   Nursing note and vitals reviewed.    Echo:CONCLUSIONS  Good.   No prior study is available for comparison.   Normal left ventricular size, wall thickness, and systolic function.   Grade I diastolic dysfunction. Left ventricular ejection fraction is   65% to 70%.  Mild aortic stenosis. Transvalvular gradients are -    Peak: 29  mmHg        Mean: 14 mmHg. Dimensionless index is 0.45. Mild to moderate aortic   insufficiency.  Mitral valve opens normally. Mitral annular calcification. No mitral   stenosis. Trace mitral regurgitation.  Structurally normal tricuspid valve. Mild tricuspid regurgitation.   Right ventricular systolic pressure is estimated to be 31-36 mmHg.  The pulmonic valve is not well visualized. No pulmonic stenosis. Trace   pulmonic insufficiency.   No pericardial effusion seen.   Ascending aorta not well visualized.   Intact atrial septum without Doppler evidence of atrial shunting.     Carotid:     Vascular Laboratory   CONCLUSIONS   Mild atherosclerotic plaque involving the bilateral carotid arteries. No    evidence of a hemodynamically significant stenosis bilaterally.      Normal antegrade vertebral artery flow bilaterally.    Lab Results   Component Value Date/Time    CHOLSTRLTOT 123 03/09/2016 02:33 AM    LDL 71 03/09/2016 02:33 AM    HDL 40 03/09/2016 02:33 AM    TRIGLYCERIDE 59 03/09/2016 02:33 AM       Lab Results   Component Value Date/Time    SODIUM 138 10/26/2017 07:37 AM    POTASSIUM 4.5 10/26/2017 07:37 AM    CHLORIDE 106 10/26/2017 07:37 AM    CO2 29 10/26/2017 07:37 AM    GLUCOSE 93 10/26/2017 07:37 AM    BUN 15 10/26/2017 07:37 AM    CREATININE 0.90 10/26/2017 07:37 AM    CREATININE 1.0 12/01/2006 07:15 PM     Lab Results   Component Value Date/Time    ALKPHOSPHAT 77 10/26/2017 07:37 AM    ASTSGOT 22 10/26/2017 07:37 AM    ALTSGPT 12 10/26/2017 07:37 AM    TBILIRUBIN 0.9 10/26/2017 07:37 AM        Assessment:     1. PAF (paroxysmal atrial fibrillation) (HCC)     2. Normocytic anemia      3. History of stroke         Medical Decision Making:  Today's Assessment / Status / Plan:     89-year-old male with paroxysmal atrial fibrillation on aspirin but declines anticoagulation.  Again he would not want to be in any of this.  He does not require any rate controlling medications.  We will simply observe him and see him back in 6 months.    Thank for you allowing me to take part in your patient's care, please call should you have any questions or would like to discuss this patient.      Tye Pan M.D.  [address] [place] [phone]  VIA In Basket

## 2018-08-01 NOTE — PROGRESS NOTES
Chief Complaint   Patient presents with   • Atrial Fibrillation       Subjective:   Alistair Tavares is a 89 y.o. male who presents today as a follow-up for his paroxysmal atrial fibrillation.  Since he was last seen he continues to be off anticoagulation and declined anticoagulation.  He has had no palpitations PND orthopnea.  His blood pressure is controlled.  He has no lower extremity edema.  He has been having no chest pain.    Past Medical History:   Diagnosis Date   • Angina 2005    episode of atrial fibrillation   • Arrhythmia     History of afib, no longer on medication   • CAD (coronary artery disease)     paroximal atrial tachycardia   • CATARACT     asim iol   • Dental disorder     lower dentures   • Macular degeneration of both eyes    • Pneumonia 2008    Not hospitalized for it   • Renal disorder 8/4/2012    pyelonephritis   • Stroke (HCC) 2015    pt reports no residual weakness   • Unspecified urinary incontinence     dribbles     Past Surgical History:   Procedure Laterality Date   • CYSTOSCOPY  6/13/2016    Procedure: CYSTOSCOPY 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;  Surgeon: Bucky Bustamante M.D.;  Location: SURGERY [facility];  Service:    • CATARACT PHACO WITH IOL  10/28/2009    Performed by PAULA COE at SURGERY SAME DAY [facility] ORS   • CATARACT PHACO WITH IOL  10/14/2009    Performed by PAULA COE at SURGERY SAME DAY [facility] ORS   • OTHER ORTHOPEDIC SURGERY      PATELLA RIGHT     Family History   Problem Relation Age of Onset   • Lung Disease Father         emphysima   • Arthritis Neg Hx      Social History     Social History   • Marital status:      Spouse name: N/A   • Number of children: N/A   • Years of education: N/A     Occupational History   • Not on file.     Social History Main Topics   • Smoking status: Never Smoker   • Smokeless tobacco: Never Used   • Alcohol use 1.8 - 5.4 oz/week     3 Standard drinks or equivalent per week   • Drug use:  "No   • Sexual activity: No     Other Topics Concern   • Not on file     Social History Narrative   • No narrative on file     Allergies   Allergen Reactions   • Bloodless Unspecified     Pt states he is willing to accept blood/blood products as \"a last resort\"   • Coufarin [Warfarin] Unspecified     Pts wife states \"He just does not like that drug\".   • Flomax [Tamsulosin] Unspecified     Pts wife states \"His BP goes really low and he fell down\"   • Heparin Hives, Rash and Itching     Pts wife states \"Rash all over body\".   • Lipitor [Atorvastatin] Rash     Pts wife states \"Rash all over body\".   • Macrobid [Kdc:Red Dye+Yellow Dye+Nitrofurantoin+Brilliant Blue Fcf] Nausea     Pt's wife states \"Makes pt weak and nausea\".     Outpatient Encounter Prescriptions as of 8/1/2018   Medication Sig Dispense Refill   • Cyanocobalamin (VITAMIN B-12 PO) Take  by mouth.     • Multiple Vitamins-Minerals ([employer]S SPECTRAVITE PO) Take 1 tablet by mouth every day at 6 PM.     • aspirin EC (ECOTRIN) 325 MG Tablet Delayed Response Take 325 mg by mouth every day.     • finasteride (PROSCAR) 5 MG Tab Take 5 mg by mouth every evening.     • CALCIUM-MAGNESIUM-ZINC PO Take 1 Tab by mouth every day.     • VITAMIN D PO Take 1 Tab by mouth every day. 1000 IU     • B Complex Vitamins (VITAMIN-B COMPLEX PO) Take 1 Tab by mouth every day.     • Lactobacillus Rhamnosus, GG, (CVS PROBIOTIC, LACTOBACILLUS,) Cap Take 1 Cap by mouth every day at 6 PM.       No facility-administered encounter medications on file as of 8/1/2018.      Review of Systems   Constitutional: Negative.  Negative for chills, fever and malaise/fatigue.   HENT: Negative.  Negative for sore throat.    Eyes: Negative.    Respiratory: Negative.  Negative for cough, hemoptysis, sputum production, shortness of breath, wheezing and stridor.    Cardiovascular: Negative.  Negative for chest pain, palpitations, orthopnea, claudication, leg swelling and PND.   Gastrointestinal: Negative.  " "  Genitourinary: Negative.    Musculoskeletal: Negative.    Skin: Negative.    Neurological: Negative.  Negative for dizziness, loss of consciousness and weakness.   Endo/Heme/Allergies: Negative.  Does not bruise/bleed easily.   All other systems reviewed and are negative.       Objective:   /82   Pulse 72   Ht 1.778 m (5' 10\")   Wt 68.5 kg (151 lb)   SpO2 98%   BMI 21.67 kg/m²     Physical Exam   Constitutional: He appears well-developed and well-nourished. No distress.   HENT:   Head: Normocephalic and atraumatic.   Right Ear: External ear normal.   Left Ear: External ear normal.   Nose: Nose normal.   Mouth/Throat: No oropharyngeal exudate.   Eyes: Pupils are equal, round, and reactive to light. Conjunctivae and EOM are normal. Right eye exhibits no discharge. Left eye exhibits no discharge. No scleral icterus.   Neck: Neck supple. No JVD present.   Cardiovascular: Normal rate, regular rhythm and intact distal pulses.  Exam reveals no gallop and no friction rub.    No murmur heard.  Pulmonary/Chest: Effort normal. No stridor. No respiratory distress. He has no wheezes. He has no rales. He exhibits no tenderness.   Abdominal: Soft. He exhibits no distension. There is no guarding.   Musculoskeletal: Normal range of motion. He exhibits no edema, tenderness or deformity.   Neurological: He is alert. He has normal reflexes. He displays normal reflexes. No cranial nerve deficit. He exhibits normal muscle tone. Coordination normal.   Skin: Skin is warm and dry. No rash noted. He is not diaphoretic. No erythema. No pallor.   Psychiatric: He has a normal mood and affect. His behavior is normal. Judgment and thought content normal.   Nursing note and vitals reviewed.    Echo:CONCLUSIONS  Good.   No prior study is available for comparison.   Normal left ventricular size, wall thickness, and systolic function.   Grade I diastolic dysfunction. Left ventricular ejection fraction is   65% to 70%.  Mild aortic " stenosis. Transvalvular gradients are -    Peak: 29  mmHg        Mean: 14 mmHg. Dimensionless index is 0.45. Mild to moderate aortic   insufficiency.  Mitral valve opens normally. Mitral annular calcification. No mitral   stenosis. Trace mitral regurgitation.  Structurally normal tricuspid valve. Mild tricuspid regurgitation.   Right ventricular systolic pressure is estimated to be 31-36 mmHg.  The pulmonic valve is not well visualized. No pulmonic stenosis. Trace   pulmonic insufficiency.   No pericardial effusion seen.   Ascending aorta not well visualized.   Intact atrial septum without Doppler evidence of atrial shunting.     Carotid:     Vascular Laboratory   CONCLUSIONS   Mild atherosclerotic plaque involving the bilateral carotid arteries. No    evidence of a hemodynamically significant stenosis bilaterally.      Normal antegrade vertebral artery flow bilaterally.    Lab Results   Component Value Date/Time    CHOLSTRLTOT 123 03/09/2016 02:33 AM    LDL 71 03/09/2016 02:33 AM    HDL 40 03/09/2016 02:33 AM    TRIGLYCERIDE 59 03/09/2016 02:33 AM       Lab Results   Component Value Date/Time    SODIUM 138 10/26/2017 07:37 AM    POTASSIUM 4.5 10/26/2017 07:37 AM    CHLORIDE 106 10/26/2017 07:37 AM    CO2 29 10/26/2017 07:37 AM    GLUCOSE 93 10/26/2017 07:37 AM    BUN 15 10/26/2017 07:37 AM    CREATININE 0.90 10/26/2017 07:37 AM    CREATININE 1.0 12/01/2006 07:15 PM     Lab Results   Component Value Date/Time    ALKPHOSPHAT 77 10/26/2017 07:37 AM    ASTSGOT 22 10/26/2017 07:37 AM    ALTSGPT 12 10/26/2017 07:37 AM    TBILIRUBIN 0.9 10/26/2017 07:37 AM        Assessment:     1. PAF (paroxysmal atrial fibrillation) ([facility])     2. Normocytic anemia     3. History of stroke         Medical Decision Making:  Today's Assessment / Status / Plan:     89-year-old male with paroxysmal atrial fibrillation on aspirin but declines anticoagulation.  Again he would not want to be in any of this.  He does not require any rate  controlling medications.  We will simply observe him and see him back in 6 months.    Thank for you allowing me to take part in your patient's care, please call should you have any questions or would like to discuss this patient.

## 2019-04-02 ENCOUNTER — TELEPHONE (OUTPATIENT)
Dept: CARDIOLOGY | Facility: MEDICAL CENTER | Age: 84
End: 2019-04-02

## 2019-04-02 NOTE — TELEPHONE ENCOUNTER
Spoke to patients caregiver, patient will be having blood work done for his PCP @[facility] next week otherwise no recent bloodwork.

## 2019-04-17 ENCOUNTER — OFFICE VISIT (OUTPATIENT)
Dept: CARDIOLOGY | Facility: MEDICAL CENTER | Age: 84
End: 2019-04-17
Payer: COMMERCIAL

## 2019-04-17 VITALS
SYSTOLIC BLOOD PRESSURE: 110 MMHG | BODY MASS INDEX: 22.13 KG/M2 | DIASTOLIC BLOOD PRESSURE: 70 MMHG | HEIGHT: 70 IN | HEART RATE: 83 BPM | WEIGHT: 154.6 LBS | OXYGEN SATURATION: 95 %

## 2019-04-17 DIAGNOSIS — N35.010 POST-TRAUMATIC MALE URETHRAL MEATAL STRICTURE: ICD-10-CM

## 2019-04-17 DIAGNOSIS — I48.0 PAF (PAROXYSMAL ATRIAL FIBRILLATION) (HCC): ICD-10-CM

## 2019-04-17 DIAGNOSIS — Z86.73 HISTORY OF STROKE: ICD-10-CM

## 2019-04-17 DIAGNOSIS — D64.9 NORMOCYTIC ANEMIA: ICD-10-CM

## 2019-04-17 PROCEDURE — 99214 OFFICE O/P EST MOD 30 MIN: CPT | Performed by: INTERNAL MEDICINE

## 2019-04-17 ASSESSMENT — ENCOUNTER SYMPTOMS
EYES NEGATIVE: 1
FEVER: 0
COUGH: 0
WEAKNESS: 0
SPUTUM PRODUCTION: 0
STRIDOR: 0
ORTHOPNEA: 0
PND: 0
BRUISES/BLEEDS EASILY: 0
SORE THROAT: 0
RESPIRATORY NEGATIVE: 1
SHORTNESS OF BREATH: 0
NEUROLOGICAL NEGATIVE: 1
WHEEZING: 0
CHILLS: 0
CONSTITUTIONAL NEGATIVE: 1
DIZZINESS: 0
PALPITATIONS: 0
HEMOPTYSIS: 0
CARDIOVASCULAR NEGATIVE: 1
GASTROINTESTINAL NEGATIVE: 1
LOSS OF CONSCIOUSNESS: 0
CLAUDICATION: 0
MUSCULOSKELETAL NEGATIVE: 1

## 2019-04-17 NOTE — PROGRESS NOTES
Chief Complaint   Patient presents with   • Atrial Fibrillation       Subjective:   Alistair Tavares is a 89 y.o. male who presents today as follow-up for his paroxysmal atrial fibrillation.  He continues to be hard of hearing.  He has no chest pain palpitations or PND.  From what I can tell on exam he is in atrial fibrillation today but is a symptomatic.  We discussed numerous times the indication for any coagulation in the setting of A. fib but he is declined any coagulation.  He is elected otherwise to be on a 325 mg a day aspirin.    Past Medical History:   Diagnosis Date   • Angina 2005    episode of atrial fibrillation   • Arrhythmia     History of afib, no longer on medication   • CAD (coronary artery disease)     paroximal atrial tachycardia   • CATARACT     asim iol   • Dental disorder     lower dentures   • Macular degeneration of both eyes    • Pneumonia 2008    Not hospitalized for it   • Renal disorder 8/4/2012    pyelonephritis   • Stroke (HCC) 2015    pt reports no residual weakness   • Unspecified urinary incontinence     dribbles     Past Surgical History:   Procedure Laterality Date   • CYSTOSCOPY  6/13/2016    Procedure: CYSTOSCOPY 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;  Surgeon: Bucky Bustamante M.D.;  Location: SURGERY [facility];  Service:    • CATARACT PHACO WITH IOL  10/28/2009    Performed by PAULA COE at SURGERY SAME DAY [facility] ORS   • CATARACT PHACO WITH IOL  10/14/2009    Performed by PAULA COE at SURGERY SAME DAY [facility] ORS   • OTHER ORTHOPEDIC SURGERY      PATELLA RIGHT     Family History   Problem Relation Age of Onset   • Lung Disease Father         emphysima   • Arthritis Neg Hx      Social History     Social History   • Marital status:      Spouse name: N/A   • Number of children: N/A   • Years of education: N/A     Occupational History   • Not on file.     Social History Main Topics   • Smoking status: Never Smoker   • Smokeless  "tobacco: Never Used   • Alcohol use 1.8 - 5.4 oz/week     3 Standard drinks or equivalent per week   • Drug use: No   • Sexual activity: No     Other Topics Concern   • Not on file     Social History Narrative   • No narrative on file     Allergies   Allergen Reactions   • Bloodless Unspecified     Pt states he is willing to accept blood/blood products as \"a last resort\"   • Coufarin [Warfarin] Unspecified     Pts wife states \"He just does not like that drug\".   • Flomax [Tamsulosin] Unspecified     Pts wife states \"His BP goes really low and he fell down\"   • Heparin Hives, Rash and Itching     Pts wife states \"Rash all over body\".   • Lipitor [Atorvastatin] Rash     Pts wife states \"Rash all over body\".   • Macrobid [Kdc:Red Dye+Yellow Dye+Nitrofurantoin+Brilliant Blue Fcf] Nausea     Pt's wife states \"Makes pt weak and nausea\".     Outpatient Encounter Prescriptions as of 4/17/2019   Medication Sig Dispense Refill   • Cyanocobalamin (VITAMIN B-12 PO) Take  by mouth.     • Multiple Vitamins-Minerals ([employer]S SPECTRAVITE PO) Take 1 tablet by mouth every day at 6 PM.     • aspirin EC (ECOTRIN) 325 MG Tablet Delayed Response Take 325 mg by mouth every day.     • finasteride (PROSCAR) 5 MG Tab Take 5 mg by mouth every evening.     • CALCIUM-MAGNESIUM-ZINC PO Take 1 Tab by mouth every day.     • VITAMIN D PO Take 1 Tab by mouth every day. 1000 IU     • B Complex Vitamins (VITAMIN-B COMPLEX PO) Take 1 Tab by mouth every day.     • Lactobacillus Rhamnosus, GG, (CVS PROBIOTIC, LACTOBACILLUS,) Cap Take 1 Cap by mouth every day at 6 PM.       No facility-administered encounter medications on file as of 4/17/2019.      Review of Systems   Constitutional: Negative.  Negative for chills, fever and malaise/fatigue.   HENT: Negative.  Negative for sore throat.    Eyes: Negative.    Respiratory: Negative.  Negative for cough, hemoptysis, sputum production, shortness of breath, wheezing and stridor.    Cardiovascular: Negative.  " "Negative for chest pain, palpitations, orthopnea, claudication, leg swelling and PND.   Gastrointestinal: Negative.    Genitourinary: Negative.    Musculoskeletal: Negative.    Skin: Negative.    Neurological: Negative.  Negative for dizziness, loss of consciousness and weakness.   Endo/Heme/Allergies: Negative.  Does not bruise/bleed easily.   All other systems reviewed and are negative.       Objective:   /70 (BP Location: Left arm, Patient Position: Sitting, BP Cuff Size: Adult)   Pulse 83   Ht 1.778 m (5' 10\")   Wt 70.1 kg (154 lb 9.6 oz)   SpO2 95%   BMI 22.18 kg/m²     Physical Exam   Constitutional: He appears well-developed and well-nourished. No distress.   HENT:   Head: Normocephalic and atraumatic.   Right Ear: External ear normal.   Left Ear: External ear normal.   Nose: Nose normal.   Mouth/Throat: No oropharyngeal exudate.   Eyes: Pupils are equal, round, and reactive to light. Conjunctivae and EOM are normal. Right eye exhibits no discharge. Left eye exhibits no discharge. No scleral icterus.   Neck: Neck supple. No JVD present.   Cardiovascular: Normal rate and intact distal pulses.  An irregularly irregular rhythm present. Exam reveals no gallop and no friction rub.    No murmur heard.  Pulmonary/Chest: Effort normal. No stridor. No respiratory distress. He has no wheezes. He has no rales. He exhibits no tenderness.   Abdominal: Soft. He exhibits no distension. There is no guarding.   Musculoskeletal: Normal range of motion. He exhibits no edema, tenderness or deformity.   Neurological: He is alert. He has normal reflexes. He displays normal reflexes. No cranial nerve deficit. He exhibits normal muscle tone. Coordination normal.   Skin: Skin is warm and dry. No rash noted. He is not diaphoretic. No erythema. No pallor.   Psychiatric: He has a normal mood and affect. His behavior is normal. Judgment and thought content normal.   Nursing note and vitals reviewed.      Assessment:     1. " History of stroke     2. PAF (paroxysmal atrial fibrillation) (HCC)     3. 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 Normocytic anemia         Medical Decision Making:  Today's Assessment / Status / Plan:     89-year-old male with paroxysmal atrial fibrillation.  Again we discussed the role for oral anticoagulation and he prefers to be off of an oral anticoagulant and simply on aspirin.  He understands the risks and benefits of this.  We will see him back in 6 months.    Thank for you allowing me to take part in your patient's care, please call should you have any questions or would like to discuss this patient.

## 2019-04-17 NOTE — LETTER
[facility]   [address], [address] [facility] [address]  [place] [address]  Phone: [phone]  Fax: [phone]              Alistair Tavares  10/23/1924    Encounter Date: 4/17/2019    Americo Mkceon M.D.          PROGRESS NOTE:  Chief Complaint   Patient presents with   • Atrial Fibrillation       Subjective:   Alistair Tavares is a 89 y.o. male who presents today as follow-up for his paroxysmal atrial fibrillation.  He continues to be hard of hearing.  He has no chest pain palpitations or PND.  From what I can tell on exam he is in atrial fibrillation today but is a symptomatic.  We discussed numerous times the indication for any coagulation in the setting of A. fib but he is declined any coagulation.  He is elected otherwise to be on a 325 mg a day aspirin.    Past Medical History:   Diagnosis Date   • Angina 2005    episode of atrial fibrillation   • Arrhythmia     History of afib, no longer on medication   • CAD (coronary artery disease)     paroximal atrial tachycardia   • CATARACT     asim iol   • Dental disorder     lower dentures   • Macular degeneration of both eyes    • Pneumonia 2008    Not hospitalized for it   • Renal disorder 8/4/2012    pyelonephritis   • Stroke (HCC) 2015    pt reports no residual weakness   • Unspecified urinary incontinence     dribbles     Past Surgical History:   Procedure Laterality Date   • CYSTOSCOPY  6/13/2016    Procedure: CYSTOSCOPY 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;  Surgeon: Bucky Bustamante M.D.;  Location: SURGERY [facility];  Service:    • CATARACT PHACO WITH IOL  10/28/2009    Performed by PAULA COE at SURGERY SAME DAY [facility] ORS   • CATARACT PHACO WITH IOL  10/14/2009    Performed by PAULA COE at SURGERY SAME DAY [facility] ORS   • OTHER ORTHOPEDIC SURGERY      PATELLA RIGHT     Family History   Problem Relation Age of Onset   • Lung Disease Father         emphysima   • Arthritis Neg  "Hx      Social History     Social History   • Marital status:      Spouse name: N/A   • Number of children: N/A   • Years of education: N/A     Occupational History   • Not on file.     Social History Main Topics   • Smoking status: Never Smoker   • Smokeless tobacco: Never Used   • Alcohol use 1.8 - 5.4 oz/week     3 Standard drinks or equivalent per week   • Drug use: No   • Sexual activity: No     Other Topics Concern   • Not on file     Social History Narrative   • No narrative on file     Allergies   Allergen Reactions   • Bloodless Unspecified     Pt states he is willing to accept blood/blood products as \"a last resort\"   • Coufarin [Warfarin] Unspecified     Pts wife states \"He just does not like that drug\".   • Flomax [Tamsulosin] Unspecified     Pts wife states \"His BP goes really low and he fell down\"   • Heparin Hives, Rash and Itching     Pts wife states \"Rash all over body\".   • Lipitor [Atorvastatin] Rash     Pts wife states \"Rash all over body\".   • Macrobid [Kdc:Red Dye+Yellow Dye+Nitrofurantoin+Brilliant Blue Fcf] Nausea     Pt's wife states \"Makes pt weak and nausea\".     Outpatient Encounter Prescriptions as of 4/17/2019   Medication Sig Dispense Refill   • Cyanocobalamin (VITAMIN B-12 PO) Take  by mouth.     • Multiple Vitamins-Minerals ([employer] SPECTRAVITE PO) Take 1 tablet by mouth every day at 6 PM.     • aspirin EC (ECOTRIN) 325 MG Tablet Delayed Response Take 325 mg by mouth every day.     • finasteride (PROSCAR) 5 MG Tab Take 5 mg by mouth every evening.     • CALCIUM-MAGNESIUM-ZINC PO Take 1 Tab by mouth every day.     • VITAMIN D PO Take 1 Tab by mouth every day. 1000 IU     • B Complex Vitamins (VITAMIN-B COMPLEX PO) Take 1 Tab by mouth every day.     • Lactobacillus Rhamnosus, GG, (CVS PROBIOTIC, LACTOBACILLUS,) Cap Take 1 Cap by mouth every day at 6 PM.       No facility-administered encounter medications on file as of 4/17/2019.      Review of Systems   Constitutional: Negative.  " "Negative for chills, fever and malaise/fatigue.   HENT: Negative.  Negative for sore throat.    Eyes: Negative.    Respiratory: Negative.  Negative for cough, hemoptysis, sputum production, shortness of breath, wheezing and stridor.    Cardiovascular: Negative.  Negative for chest pain, palpitations, orthopnea, claudication, leg swelling and PND.   Gastrointestinal: Negative.    Genitourinary: Negative.    Musculoskeletal: Negative.    Skin: Negative.    Neurological: Negative.  Negative for dizziness, loss of consciousness and weakness.   Endo/Heme/Allergies: Negative.  Does not bruise/bleed easily.   All other systems reviewed and are negative.       Objective:   /70 (BP Location: Left arm, Patient Position: Sitting, BP Cuff Size: Adult)   Pulse 83   Ht 1.778 m (5' 10\")   Wt 70.1 kg (154 lb 9.6 oz)   SpO2 95%   BMI 22.18 kg/m²      Physical Exam   Constitutional: He appears well-developed and well-nourished. No distress.   HENT:   Head: Normocephalic and atraumatic.   Right Ear: External ear normal.   Left Ear: External ear normal.   Nose: Nose normal.   Mouth/Throat: No oropharyngeal exudate.   Eyes: Pupils are equal, round, and reactive to light. Conjunctivae and EOM are normal. Right eye exhibits no discharge. Left eye exhibits no discharge. No scleral icterus.   Neck: Neck supple. No JVD present.   Cardiovascular: Normal rate and intact distal pulses.  An irregularly irregular rhythm present. Exam reveals no gallop and no friction rub.    No murmur heard.  Pulmonary/Chest: Effort normal. No stridor. No respiratory distress. He has no wheezes. He has no rales. He exhibits no tenderness.   Abdominal: Soft. He exhibits no distension. There is no guarding.   Musculoskeletal: Normal range of motion. He exhibits no edema, tenderness or deformity.   Neurological: He is alert. He has normal reflexes. He displays normal reflexes. No cranial nerve deficit. He exhibits normal muscle tone. Coordination normal.  "   Skin: Skin is warm and dry. No rash noted. He is not diaphoretic. No erythema. No pallor.   Psychiatric: He has a normal mood and affect. His behavior is normal. Judgment and thought content normal.   Nursing note and vitals reviewed.      Assessment:     1. History of stroke     2. PAF (paroxysmal atrial fibrillation) (HCC)     3. 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 Normocytic anemia         Medical Decision Making:  Today's Assessment / Status / Plan:     89-year-old male with paroxysmal atrial fibrillation.  Again we discussed the role for oral anticoagulation and he prefers to be off of an oral anticoagulant and simply on aspirin.  He understands the risks and benefits of this.  We will see him back in 6 months.    Thank for you allowing me to take part in your patient's care, please call should you have any questions or would like to discuss this patient.      Alex Flores III, D.O.  [address] [facility] [address]  VIA Facsimile: [phone]

## 2019-10-16 ENCOUNTER — OFFICE VISIT (OUTPATIENT)
Dept: CARDIOLOGY | Facility: MEDICAL CENTER | Age: 84
End: 2019-10-16
Payer: COMMERCIAL

## 2019-10-16 VITALS
HEART RATE: 68 BPM | BODY MASS INDEX: 22.05 KG/M2 | DIASTOLIC BLOOD PRESSURE: 80 MMHG | WEIGHT: 154 LBS | HEIGHT: 70 IN | OXYGEN SATURATION: 94 % | SYSTOLIC BLOOD PRESSURE: 110 MMHG

## 2019-10-16 DIAGNOSIS — I48.0 PAF (PAROXYSMAL ATRIAL FIBRILLATION) (HCC): ICD-10-CM

## 2019-10-16 PROCEDURE — 99213 OFFICE O/P EST LOW 20 MIN: CPT | Performed by: NURSE PRACTITIONER

## 2019-10-16 SDOH — HEALTH STABILITY: MENTAL HEALTH: HOW OFTEN DO YOU HAVE A DRINK CONTAINING ALCOHOL?: 4 OR MORE TIMES A WEEK

## 2019-10-16 SDOH — HEALTH STABILITY: MENTAL HEALTH: HOW MANY STANDARD DRINKS CONTAINING ALCOHOL DO YOU HAVE ON A TYPICAL DAY?: 1 OR 2

## 2019-10-16 ASSESSMENT — ENCOUNTER SYMPTOMS
CLAUDICATION: 0
SHORTNESS OF BREATH: 0
COUGH: 0
FEVER: 0
ABDOMINAL PAIN: 0
ORTHOPNEA: 0
DIZZINESS: 0
MYALGIAS: 0
PND: 0
PALPITATIONS: 0

## 2019-10-16 NOTE — PROGRESS NOTES
Chief Complaint   Patient presents with   • Atrial Fibrillation       Subjective:   Alistair Tavares is a 89 y.o. male who presents today for follow up on his Afib with his wife, Rekha.    Patient of Dr. Mckeon. He was last seen in clinic on 4/17/2019. No changes were made during that visit.     Over the past 6 months, Patient feels well, denies chest pain, shortness of breath, palpitations, dizziness/lightheadedness, orthopnea, PND or Edema.     He states he feels he is not in afib.    He continues to decline OAC.     Additonally, patient has the following medical problems:    -Hx stroke    Past Medical History:   Diagnosis Date   • Angina 2005    episode of atrial fibrillation   • Arrhythmia     History of afib, no longer on medication   • CAD (coronary artery disease)     paroximal atrial tachycardia   • CATARACT     asim iol   • Dental disorder     lower dentures   • Macular degeneration of both eyes    • Pneumonia 2008    Not hospitalized for it   • Renal disorder 8/4/2012    pyelonephritis   • Stroke (HCC) 2015    pt reports no residual weakness   • Unspecified urinary incontinence     dribbles     Past Surgical History:   Procedure Laterality Date   • CYSTOSCOPY  6/13/2016    Procedure: CYSTOSCOPY 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;  Surgeon: Bucky Bustamante M.D.;  Location: SURGERY [facility];  Service:    • CATARACT PHACO WITH IOL  10/28/2009    Performed by PAULA COE at SURGERY SAME DAY [facility] ORS   • CATARACT PHACO WITH IOL  10/14/2009    Performed by PAULA COE at SURGERY SAME DAY [facility] ORS   • OTHER ORTHOPEDIC SURGERY      PATELLA RIGHT     Family History   Problem Relation Age of Onset   • Lung Disease Father         emphysima   • Arthritis Neg Hx      Social History     Socioeconomic History   • Marital status:      Spouse name: Not on file   • Number of children: Not on file   • Years of education: Not on file   • Highest education level: Not on  "file   Occupational History   • Not on file   Social Needs   • Financial resource strain: Not on file   • Food insecurity:     Worry: Not on file     Inability: Not on file   • Transportation needs:     Medical: Not on file     Non-medical: Not on file   Tobacco Use   • Smoking status: Never Smoker   • Smokeless tobacco: Never Used   Substance and Sexual Activity   • Alcohol use: Yes     Alcohol/week: 2.4 - 3.0 oz     Types: 3 Standard drinks or equivalent, 1 - 2 Cans of beer per week     Frequency: 4 or more times a week     Drinks per session: 1 or 2   • Drug use: No   • Sexual activity: Never     Partners: Female   Lifestyle   • Physical activity:     Days per week: Not on file     Minutes per session: Not on file   • Stress: Not on file   Relationships   • Social connections:     Talks on phone: Not on file     Gets together: Not on file     Attends [employer] service: Not on file     Active member of club or organization: Not on file     Attends meetings of clubs or organizations: Not on file     Relationship status: Not on file   • Intimate partner violence:     Fear of current or ex partner: Not on file     Emotionally abused: Not on file     Physically abused: Not on file     Forced sexual activity: Not on file   Other Topics Concern   • Not on file   Social History Narrative   • Not on file     Allergies   Allergen Reactions   • Bloodless Unspecified     Pt states he is willing to accept blood/blood products as \"a last resort\"   • Coufarin [Warfarin] Unspecified     Pts wife states \"He just does not like that drug\".   • Flomax [Tamsulosin] Unspecified     Pts wife states \"His BP goes really low and he fell down\"   • Heparin Hives, Rash and Itching     Pts wife states \"Rash all over body\".   • Lipitor [Atorvastatin] Rash     Pts wife states \"Rash all over body\".   • Macrobid [Kdc:Red Dye+Yellow Dye+Nitrofurantoin+Brilliant Blue Fcf] Nausea     Pt's wife states \"Makes pt weak and nausea\".     Outpatient " "Encounter Medications as of 10/16/2019   Medication Sig Dispense Refill   • Cyanocobalamin (VITAMIN B-12 PO) Take  by mouth.     • Multiple Vitamins-Minerals ([employer]S SPECTRAVITE PO) Take 1 tablet by mouth every day at 6 PM.     • aspirin EC (ECOTRIN) 325 MG Tablet Delayed Response Take 325 mg by mouth every day.     • finasteride (PROSCAR) 5 MG Tab Take 5 mg by mouth every evening.     • CALCIUM-MAGNESIUM-ZINC PO Take 1 Tab by mouth every day.     • VITAMIN D PO Take 1 Tab by mouth every day. 1000 IU     • B Complex Vitamins (VITAMIN-B COMPLEX PO) Take 1 Tab by mouth every day.     • [DISCONTINUED] Lactobacillus Rhamnosus, GG, (CVS PROBIOTIC, LACTOBACILLUS,) Cap Take 1 Cap by mouth every day at 6 PM.       No facility-administered encounter medications on file as of 10/16/2019.      Review of Systems   Constitutional: Negative for fever and malaise/fatigue.   Respiratory: Negative for cough and shortness of breath.    Cardiovascular: Negative for chest pain, palpitations, orthopnea, claudication, leg swelling and PND.   Gastrointestinal: Negative for abdominal pain.   Musculoskeletal: Negative for myalgias.   Neurological: Negative for dizziness.   All other systems reviewed and are negative.       Objective:   /80 (BP Location: Right arm, Patient Position: Sitting, BP Cuff Size: Adult)   Pulse 68   Ht 1.778 m (5' 10\")   Wt 69.9 kg (154 lb)   SpO2 94%   BMI 22.10 kg/m²     Physical Exam   Constitutional: He is oriented to person, place, and time. He appears well-developed and well-nourished.   HENT:   Head: Normocephalic and atraumatic.   Eyes: Pupils are equal, round, and reactive to light. EOM are normal.   Neck: Normal range of motion. Neck supple. No JVD present.   Cardiovascular: Normal rate and normal heart sounds. An irregularly irregular rhythm present.   Pulmonary/Chest: Effort normal and breath sounds normal. No respiratory distress. He has no wheezes. He has no rales.   Abdominal: Soft. Bowel " sounds are normal.   Musculoskeletal: He exhibits no edema.   Neurological: He is alert and oriented to person, place, and time.   Skin: Skin is warm and dry.   Psychiatric: He has a normal mood and affect. His behavior is normal.   Vitals reviewed.    Lab Results   Component Value Date/Time    CHOLSTRLTOT 123 03/09/2016 02:33 AM    LDL 71 03/09/2016 02:33 AM    HDL 40 03/09/2016 02:33 AM    TRIGLYCERIDE 59 03/09/2016 02:33 AM       Lab Results   Component Value Date/Time    SODIUM 138 10/26/2017 07:37 AM    POTASSIUM 4.5 10/26/2017 07:37 AM    CHLORIDE 106 10/26/2017 07:37 AM    CO2 29 10/26/2017 07:37 AM    GLUCOSE 93 10/26/2017 07:37 AM    BUN 15 10/26/2017 07:37 AM    CREATININE 0.90 10/26/2017 07:37 AM    CREATININE 1.0 12/01/2006 07:15 PM     Lab Results   Component Value Date/Time    ALKPHOSPHAT 77 10/26/2017 07:37 AM    ASTSGOT 22 10/26/2017 07:37 AM    ALTSGPT 12 10/26/2017 07:37 AM    TBILIRUBIN 0.9 10/26/2017 07:37 AM      Other Labs in MEDIA from 4/2019.    Transthoracic Echo Report 4/9/2015  Good.   No prior study is available for comparison.   Normal left ventricular size, wall thickness, and systolic function.   Grade I diastolic dysfunction. Left ventricular ejection fraction is   65% to 70%.  Mild aortic stenosis. Transvalvular gradients are -    Peak: 29  mmHg        Mean: 14 mmHg. Dimensionless index is 0.45. Mild to moderate aortic insufficiency.  Mitral valve opens normally. Mitral annular calcification. No mitral   stenosis. Trace mitral regurgitation.  Structurally normal tricuspid valve. Mild tricuspid regurgitation.   Right ventricular systolic pressure is estimated to be 31-36 mmHg.  The pulmonic valve is not well visualized. No pulmonic stenosis. Trace pulmonic insufficiency.   No pericardial effusion seen.   Ascending aorta not well visualized.   Intact atrial septum without Doppler evidence of atrial shunting.     Assessment:     1. PAF (paroxysmal atrial fibrillation) (HCC)  Basic  Metabolic Panel       Medical Decision Making:  Today's Assessment / Status / Plan:   1.  Atrial fibrillation: Patient appears to be rate controlled  -Continue aspirin 325 mg daily  -Reintroduced oral anticoagulation with patient, he continues to decline  -BMP this month    FU in clinic in 6 month with Dr. Mckeon. Sooner if needed.    Patient verbalizes understanding and agrees with the plan of care.     Collaborating MD: Jerson Smith MD

## 2020-06-06 ENCOUNTER — HOSPITAL ENCOUNTER (INPATIENT)
Facility: MEDICAL CENTER | Age: 85
LOS: 4 days | DRG: 065 | End: 2020-06-10
Attending: EMERGENCY MEDICINE | Admitting: HOSPITALIST
Payer: COMMERCIAL

## 2020-06-06 ENCOUNTER — APPOINTMENT (OUTPATIENT)
Dept: RADIOLOGY | Facility: MEDICAL CENTER | Age: 85
DRG: 065 | End: 2020-06-06
Attending: PSYCHIATRY & NEUROLOGY
Payer: COMMERCIAL

## 2020-06-06 ENCOUNTER — APPOINTMENT (OUTPATIENT)
Dept: RADIOLOGY | Facility: MEDICAL CENTER | Age: 85
DRG: 065 | End: 2020-06-06
Attending: EMERGENCY MEDICINE
Payer: COMMERCIAL

## 2020-06-06 ENCOUNTER — APPOINTMENT (OUTPATIENT)
Dept: RADIOLOGY | Facility: MEDICAL CENTER | Age: 85
DRG: 065 | End: 2020-06-06
Attending: HOSPITALIST
Payer: COMMERCIAL

## 2020-06-06 DIAGNOSIS — R47.01 APHASIA: ICD-10-CM

## 2020-06-06 DIAGNOSIS — I63.512 ACUTE ISCHEMIC LEFT MCA STROKE (HCC): ICD-10-CM

## 2020-06-06 DIAGNOSIS — I63.9 ACUTE CVA (CEREBROVASCULAR ACCIDENT) (HCC): ICD-10-CM

## 2020-06-06 DIAGNOSIS — R41.82 ALTERED MENTAL STATUS, UNSPECIFIED ALTERED MENTAL STATUS TYPE: ICD-10-CM

## 2020-06-06 DIAGNOSIS — E87.1 HYPONATREMIA: ICD-10-CM

## 2020-06-06 LAB
ALBUMIN SERPL BCP-MCNC: 4.1 G/DL (ref 3.2–4.9)
ALBUMIN/GLOB SERPL: 1.2 G/DL
ALP SERPL-CCNC: 98 U/L (ref 30–99)
ALT SERPL-CCNC: 14 U/L (ref 2–50)
ANION GAP SERPL CALC-SCNC: 12 MMOL/L (ref 7–16)
AST SERPL-CCNC: 23 U/L (ref 12–45)
BASOPHILS # BLD AUTO: 0.7 % (ref 0–1.8)
BASOPHILS # BLD: 0.08 K/UL (ref 0–0.12)
BILIRUB SERPL-MCNC: 1 MG/DL (ref 0.1–1.5)
BUN SERPL-MCNC: 17 MG/DL (ref 8–22)
CALCIUM SERPL-MCNC: 9.5 MG/DL (ref 8.5–10.5)
CHLORIDE SERPL-SCNC: 97 MMOL/L (ref 96–112)
CO2 SERPL-SCNC: 21 MMOL/L (ref 20–33)
CREAT SERPL-MCNC: 0.76 MG/DL (ref 0.5–1.4)
EKG IMPRESSION: NORMAL
EOSINOPHIL # BLD AUTO: 0.12 K/UL (ref 0–0.51)
EOSINOPHIL NFR BLD: 1 % (ref 0–6.9)
ERYTHROCYTE [DISTWIDTH] IN BLOOD BY AUTOMATED COUNT: 43.6 FL (ref 35.9–50)
EST. AVERAGE GLUCOSE BLD GHB EST-MCNC: 111 MG/DL
GLOBULIN SER CALC-MCNC: 3.4 G/DL (ref 1.9–3.5)
GLUCOSE BLD-MCNC: 95 MG/DL (ref 65–99)
GLUCOSE SERPL-MCNC: 106 MG/DL (ref 65–99)
HBA1C MFR BLD: 5.5 % (ref 0–5.6)
HCT VFR BLD AUTO: 43 % (ref 42–52)
HGB BLD-MCNC: 14.2 G/DL (ref 14–18)
IMM GRANULOCYTES # BLD AUTO: 0.05 K/UL (ref 0–0.11)
IMM GRANULOCYTES NFR BLD AUTO: 0.4 % (ref 0–0.9)
INR PPP: 1.13 (ref 0.87–1.13)
LYMPHOCYTES # BLD AUTO: 1.59 K/UL (ref 1–4.8)
LYMPHOCYTES NFR BLD: 13.4 % (ref 22–41)
MCH RBC QN AUTO: 31.3 PG (ref 27–33)
MCHC RBC AUTO-ENTMCNC: 33 G/DL (ref 33.7–35.3)
MCV RBC AUTO: 94.9 FL (ref 81.4–97.8)
MONOCYTES # BLD AUTO: 1.47 K/UL (ref 0–0.85)
MONOCYTES NFR BLD AUTO: 12.4 % (ref 0–13.4)
NEUTROPHILS # BLD AUTO: 8.58 K/UL (ref 1.82–7.42)
NEUTROPHILS NFR BLD: 72.1 % (ref 44–72)
NRBC # BLD AUTO: 0 K/UL
NRBC BLD-RTO: 0 /100 WBC
PLATELET # BLD AUTO: 219 K/UL (ref 164–446)
PMV BLD AUTO: 9.3 FL (ref 9–12.9)
POTASSIUM SERPL-SCNC: 3.8 MMOL/L (ref 3.6–5.5)
PROT SERPL-MCNC: 7.5 G/DL (ref 6–8.2)
PROTHROMBIN TIME: 14.8 SEC (ref 12–14.6)
RBC # BLD AUTO: 4.53 M/UL (ref 4.7–6.1)
SODIUM SERPL-SCNC: 130 MMOL/L (ref 135–145)
TROPONIN T SERPL-MCNC: 18 NG/L (ref 6–19)
WBC # BLD AUTO: 11.9 K/UL (ref 4.8–10.8)

## 2020-06-06 PROCEDURE — 82962 GLUCOSE BLOOD TEST: CPT

## 2020-06-06 PROCEDURE — 99254 IP/OBS CNSLTJ NEW/EST MOD 60: CPT | Performed by: PSYCHIATRY & NEUROLOGY

## 2020-06-06 PROCEDURE — 84484 ASSAY OF TROPONIN QUANT: CPT

## 2020-06-06 PROCEDURE — 700117 HCHG RX CONTRAST REV CODE 255: Performed by: PSYCHIATRY & NEUROLOGY

## 2020-06-06 PROCEDURE — 70450 CT HEAD/BRAIN W/O DYE: CPT

## 2020-06-06 PROCEDURE — 70551 MRI BRAIN STEM W/O DYE: CPT

## 2020-06-06 PROCEDURE — 80053 COMPREHEN METABOLIC PANEL: CPT

## 2020-06-06 PROCEDURE — 85025 COMPLETE CBC W/AUTO DIFF WBC: CPT

## 2020-06-06 PROCEDURE — A9270 NON-COVERED ITEM OR SERVICE: HCPCS | Performed by: PSYCHIATRY & NEUROLOGY

## 2020-06-06 PROCEDURE — 85610 PROTHROMBIN TIME: CPT

## 2020-06-06 PROCEDURE — 70498 CT ANGIOGRAPHY NECK: CPT

## 2020-06-06 PROCEDURE — 70496 CT ANGIOGRAPHY HEAD: CPT

## 2020-06-06 PROCEDURE — 93005 ELECTROCARDIOGRAM TRACING: CPT | Performed by: EMERGENCY MEDICINE

## 2020-06-06 PROCEDURE — 83036 HEMOGLOBIN GLYCOSYLATED A1C: CPT

## 2020-06-06 PROCEDURE — 700102 HCHG RX REV CODE 250 W/ 637 OVERRIDE(OP): Performed by: PSYCHIATRY & NEUROLOGY

## 2020-06-06 PROCEDURE — 71045 X-RAY EXAM CHEST 1 VIEW: CPT

## 2020-06-06 PROCEDURE — 99223 1ST HOSP IP/OBS HIGH 75: CPT | Performed by: HOSPITALIST

## 2020-06-06 PROCEDURE — 99285 EMERGENCY DEPT VISIT HI MDM: CPT

## 2020-06-06 PROCEDURE — 770020 HCHG ROOM/CARE - TELE (206)

## 2020-06-06 RX ORDER — ONDANSETRON 4 MG/1
4 TABLET, ORALLY DISINTEGRATING ORAL EVERY 4 HOURS PRN
Status: DISCONTINUED | OUTPATIENT
Start: 2020-06-06 | End: 2020-06-10 | Stop reason: HOSPADM

## 2020-06-06 RX ORDER — CHOLECALCIFEROL (VITAMIN D3) 50 MCG
2000 TABLET ORAL EVERY MORNING
COMMUNITY

## 2020-06-06 RX ORDER — AMOXICILLIN 250 MG
2 CAPSULE ORAL 2 TIMES DAILY
Status: DISCONTINUED | OUTPATIENT
Start: 2020-06-07 | End: 2020-06-10 | Stop reason: HOSPADM

## 2020-06-06 RX ORDER — M-VIT,TX,IRON,MINS/CALC/FOLIC 27MG-0.4MG
1 TABLET ORAL DAILY
COMMUNITY
End: 2021-04-30

## 2020-06-06 RX ORDER — ASPIRIN 300 MG/1
300 SUPPOSITORY RECTAL ONCE
Status: COMPLETED | OUTPATIENT
Start: 2020-06-06 | End: 2020-06-06

## 2020-06-06 RX ORDER — ACETAMINOPHEN 325 MG/1
650 TABLET ORAL EVERY 6 HOURS PRN
Status: DISCONTINUED | OUTPATIENT
Start: 2020-06-06 | End: 2020-06-10 | Stop reason: HOSPADM

## 2020-06-06 RX ORDER — ONDANSETRON 2 MG/ML
4 INJECTION INTRAMUSCULAR; INTRAVENOUS EVERY 4 HOURS PRN
Status: DISCONTINUED | OUTPATIENT
Start: 2020-06-06 | End: 2020-06-10 | Stop reason: HOSPADM

## 2020-06-06 RX ORDER — POLYETHYLENE GLYCOL 3350 17 G/17G
1 POWDER, FOR SOLUTION ORAL
Status: DISCONTINUED | OUTPATIENT
Start: 2020-06-06 | End: 2020-06-10 | Stop reason: HOSPADM

## 2020-06-06 RX ORDER — FINASTERIDE 5 MG/1
5 TABLET, FILM COATED ORAL EVERY EVENING
Status: DISCONTINUED | OUTPATIENT
Start: 2020-06-07 | End: 2020-06-10 | Stop reason: HOSPADM

## 2020-06-06 RX ORDER — BISACODYL 10 MG
10 SUPPOSITORY, RECTAL RECTAL
Status: DISCONTINUED | OUTPATIENT
Start: 2020-06-06 | End: 2020-06-10 | Stop reason: HOSPADM

## 2020-06-06 RX ORDER — ASPIRIN 325 MG
325 TABLET ORAL DAILY
Status: DISCONTINUED | OUTPATIENT
Start: 2020-06-07 | End: 2020-06-08

## 2020-06-06 RX ADMIN — ASPIRIN 300 MG: 300 SUPPOSITORY RECTAL at 15:04

## 2020-06-06 RX ADMIN — IOHEXOL 80 ML: 350 INJECTION, SOLUTION INTRAVENOUS at 16:59

## 2020-06-06 ASSESSMENT — COGNITIVE AND FUNCTIONAL STATUS - GENERAL
PERSONAL GROOMING: A LITTLE
DAILY ACTIVITIY SCORE: 18
SUGGESTED CMS G CODE MODIFIER MOBILITY: CJ
WALKING IN HOSPITAL ROOM: A LITTLE
CLIMB 3 TO 5 STEPS WITH RAILING: A LITTLE
EATING MEALS: A LITTLE
TOILETING: A LITTLE
DRESSING REGULAR LOWER BODY CLOTHING: A LITTLE
MOBILITY SCORE: 21
HELP NEEDED FOR BATHING: A LITTLE
SUGGESTED CMS G CODE MODIFIER DAILY ACTIVITY: CK
STANDING UP FROM CHAIR USING ARMS: A LITTLE
DRESSING REGULAR UPPER BODY CLOTHING: A LITTLE

## 2020-06-06 ASSESSMENT — LIFESTYLE VARIABLES
HAVE PEOPLE ANNOYED YOU BY CRITICIZING YOUR DRINKING: NO
CONSUMPTION TOTAL: NEGATIVE
EVER HAD A DRINK FIRST THING IN THE MORNING TO STEADY YOUR NERVES TO GET RID OF A HANGOVER: NO
TOTAL SCORE: 0
EVER FELT BAD OR GUILTY ABOUT YOUR DRINKING: NO
TOTAL SCORE: 0
AVERAGE NUMBER OF DAYS PER WEEK YOU HAVE A DRINK CONTAINING ALCOHOL: 1
ON A TYPICAL DAY WHEN YOU DRINK ALCOHOL HOW MANY DRINKS DO YOU HAVE: 1
HOW MANY TIMES IN THE PAST YEAR HAVE YOU HAD 5 OR MORE DRINKS IN A DAY: 0
ALCOHOL_USE: YES
TOTAL SCORE: 0
DOES PATIENT WANT TO STOP DRINKING: NO
HAVE YOU EVER FELT YOU SHOULD CUT DOWN ON YOUR DRINKING: NO

## 2020-06-06 ASSESSMENT — FIBROSIS 4 INDEX: FIB4 SCORE: 2.67

## 2020-06-06 ASSESSMENT — PATIENT HEALTH QUESTIONNAIRE - PHQ9
1. LITTLE INTEREST OR PLEASURE IN DOING THINGS: NOT AT ALL
SUM OF ALL RESPONSES TO PHQ9 QUESTIONS 1 AND 2: 0
2. FEELING DOWN, DEPRESSED, IRRITABLE, OR HOPELESS: NOT AT ALL

## 2020-06-06 NOTE — PROGRESS NOTES
Transferred pt to the floor, wife at bedside, pt declined pain, numbness/tingling. Ambulated from gurney to bed, gait staggering. All fall precautions in place, will cont to monitor

## 2020-06-06 NOTE — ED NOTES
Med Rec Updated and Complete per Pt's Wife over the phone  Allergies Reviewed  No PO ABX last 14 days.    Pt taking LD ASA daily.

## 2020-06-06 NOTE — ED TRIAGE NOTES
Pt bib wife. Yesterday pt took a nap and woke up at 11am confused, unable to say words. Pt only oriented to self at this time. No extremity weakness noted.

## 2020-06-06 NOTE — H&P
Hospital Medicine History & Physical Note    Date of Service  6/6/2020    Primary Care Physician  Alex Flores III, D.O.    Code Status  Full Code    Chief Complaint  Chief Complaint   Patient presents with   • ALOC   • Possible Stroke       History of Presenting Illness  89 y.o. male who has a previous medical history of atrial fibrillation.  He had been recommended to be on anticoagulation regimen however had declined out of fear of intracranial hemorrhage as a family member had had that problem.    Wife notes that yesterday the patient awoke and seemed confused.  His words were clear however they did not make sense.  He did not seem to understand when she would speak to him.  She tried to take him to the hospital however he refused.  Today his symptoms had continued they were perhaps a little bit better but she stopped listening to him when he refused to come to the hospital and brought him regardless.    Here in the ED a CT has been done which demonstrates a subacute infarct of the left temporal region.  Patient is to be admitted for work-up of stroke, with neurology consultation.    Review of Systems  Review of Systems   Unable to perform ROS: Other       Past Medical History   has a past medical history of Angina (2005), Arrhythmia, CAD (coronary artery disease), CATARACT, Dental disorder, Macular degeneration of both eyes, Pneumonia (2008), Renal disorder (8/4/2012), Stroke ([facility]) (2015), and Unspecified urinary incontinence.    Surgical History   has a past surgical history that includes cataract phaco with iol (10/14/2009); cataract phaco with iol (10/28/2009); other orthopedic surgery; and cystoscopy (6/13/2016).     Family History  family history includes Lung Disease in his father.     Social History   reports that he has never smoked. He has never used smokeless tobacco. He reports current alcohol use of about 2.4 - 3.0 oz of alcohol per week. He reports that he does not use  "drugs.    Allergies  Allergies   Allergen Reactions   • Bloodless Unspecified     Pt states he is willing to accept blood/blood products as \"a last resort\"   • Coufarin [Warfarin] Unspecified     Pts wife states \"He just does not like that drug\".   • Flomax [Tamsulosin] Unspecified     Pts wife states \"His BP goes really low and he fell down\"   • Heparin Hives, Rash and Itching     Pts wife states \"Rash all over body\".   • Lipitor [Atorvastatin] Rash     Pts wife states \"Rash all over body\".   • Macrobid [Kdc:Red Dye+Yellow Dye+Nitrofurantoin+Brilliant Blue Fcf] Nausea     Pt's wife states \"Makes pt weak and nausea\".       Medications  Prior to Admission Medications   Prescriptions Last Dose Informant Patient Reported? Taking?   B Complex Vitamins (VITAMIN-B COMPLEX PO) 6/6/2020 at AM Family Member Yes No   Sig: Take 1 Tab by mouth every day. Unknown OTC Strength.   CALCIUM-MAGNESIUM-ZINC PO 6/6/2020 at AM Family Member Yes No   Sig: Take 1 Tab by mouth every day. Unknown OTC Strength.   Cyanocobalamin (VITAMIN B-12 PO) 6/6/2020 at AM Family Member Yes No   Sig: Take 1 Dose by mouth every day. Unknown OTC Strength.   aspirin EC (ECOTRIN) 81 MG Tablet Delayed Response 6/6/2020 at AM Family Member Yes Yes   Sig: Take 81 mg by mouth every day.   finasteride (PROSCAR) 5 MG Tab 6/5/2020 at PM Family Member Yes No   Sig: Take 5 mg by mouth every evening.   therapeutic multivitamin-minerals (THERAGRAN-M) Tab 6/6/2020 at AM Family Member Yes Yes   Sig: Take 1 Tab by mouth every day.   vitamin D (CHOLECALCIFEROL) 1000 Unit (25 mcg) Tab 6/6/2020 at AM Family Member Yes Yes   Sig: Take 1,000 Units by mouth every day.      Facility-Administered Medications: None       Physical Exam  Temp:  [36.2 °C (97.1 °F)-36.4 °C (97.6 °F)] 36.4 °C (97.6 °F)  Pulse:  [63-93] 74  Resp:  [16-20] 18  BP: (123-157)/(67-84) 123/76  SpO2:  [90 %-98 %] 96 %    Physical Exam  Vitals signs reviewed.   Constitutional:       General: He is not in acute " distress.     Appearance: He is well-developed. He is not diaphoretic.   HENT:      Head: Normocephalic and atraumatic.   Eyes:      Conjunctiva/sclera: Conjunctivae normal.   Neck:      Vascular: No JVD.   Cardiovascular:      Rate and Rhythm: Normal rate.      Heart sounds: Murmur present. No gallop.    Pulmonary:      Effort: Pulmonary effort is normal. No respiratory distress.      Breath sounds: No stridor. No wheezing or rales.   Abdominal:      Palpations: Abdomen is soft.      Tenderness: There is no abdominal tenderness. There is no guarding or rebound.   Musculoskeletal:         General: No tenderness.      Right lower leg: No edema.      Left lower leg: No edema.   Skin:     General: Skin is warm and dry.      Findings: No rash.   Neurological:      Mental Status: He is oriented to person, place, and time.      Comments: Cranial nerves II through XII are intact.  Strength is 5/5.  Finger-to-nose and heel-to-shin are normal.  Patient is alert and attends to the conversation.  He follows visual cues however does not follow verbal cues.  Speech is clear however words are inappropriate.   Psychiatric:         Mood and Affect: Mood normal.         Laboratory:  Recent Labs     06/06/20  1005   WBC 11.9*   RBC 4.53*   HEMOGLOBIN 14.2   HEMATOCRIT 43.0   MCV 94.9   MCH 31.3   MCHC 33.0*   RDW 43.6   PLATELETCT 219   MPV 9.3     Recent Labs     06/06/20  1005   SODIUM 130*   POTASSIUM 3.8   CHLORIDE 97   CO2 21   GLUCOSE 106*   BUN 17   CREATININE 0.76   CALCIUM 9.5     Recent Labs     06/06/20  1005   ALTSGPT 14   ASTSGOT 23   ALKPHOSPHAT 98   TBILIRUBIN 1.0   GLUCOSE 106*     Recent Labs     06/06/20  1005   INR 1.13     No results for input(s): NTPROBNP in the last 72 hours.      Recent Labs     06/06/20  1005   TROPONINT 18       Imaging:  MR-BRAIN-W/O   Final Result      1.  Small area of acute infarct in the left temporal and parietal lobes.   2.  Chronic infarcts in the right frontal lobes.   3.  Moderate  chronic microvascular ischemic disease.   4.  Moderate cerebral volume loss.      DX-CHEST-PORTABLE (1 VIEW)   Final Result      Minimal bibasilar opacities likely atelectasis/scarring.      CT-HEAD W/O   Final Result      1.  Subacute appearing moderate left temporal lobe infarct. Brain MRI may be performed to further evaluate.   2.  Advanced chronic ischemic changes.   3.  No acute intracranial hemorrhage.      CT-CTA HEAD WITH & W/O-POST PROCESS    (Results Pending)   CT-CTA NECK WITH & W/O-POST PROCESSING    (Results Pending)   EC-ECHOCARDIOGRAM COMPLETE W/O CONT    (Results Pending)         Assessment/Plan:      PAF (paroxysmal atrial fibrillation) ([facility])- (present on admission)  Assessment & Plan  Patient is currently in atrial fibrillation and rate controlled  We will continue on telemetry  Initiate rate control medication if needed  Check echo  Patient will probably need long-term anticoagulation when appropriate per neurology recommendations assuming that he is agreeable as he has not been in the past.    Hyponatremia  Assessment & Plan  Mild hyponatremia 130  Neurology is concerned that we follow which we will goal to correct into the mid 130s in the next 24 hours  Urine osmolality and urine sodium of been sent    Acute ischemic left MCA stroke ([facility])  Assessment & Plan  Possibly related to the patient's history of paroxysmal atrial fibrillation  Presents with dense expressive and receptive aphasia as  Neurology consulted  Check CTA of the neck, MRI of the brain, cardiac echo  Start aspirin  Start high-dose statin  Maintain n.p.o. 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

## 2020-06-06 NOTE — PROGRESS NOTES
"  Triage officer note     D/W Dr Meyers     89M, PMHx CAD, previous CVA    presents with altered mental status.      CT shows a stroke  \"Subacute appearing moderate left temporal lobe infarct'     Outside of the window for tPA    Neuro will be consulted by EDP      Inpatient Neuro admission with cardiac monitoring.  Admitting hospitalist is Dr Gordon        "

## 2020-06-06 NOTE — ED NOTES
Break RN:  MRI Questionnaire filled out with assistance from wife due to pt aphasia.  MRI notified.

## 2020-06-06 NOTE — ED PROVIDER NOTES
ED Provider Note  CHIEF COMPLAINT  Chief Complaint   Patient presents with   • ALOC   • Possible Stroke       HPI  Alistair Tavares is a 89 y.o. male who presents with altered mental status.  Patient's wife is the historian.  She states he normally is alert and oriented when yesterday after he awoke from a nap at about 11:00 seemed confused.  She states he was having trouble speaking and his words were all jumbled up.  She did not notice any trouble walking or any weakness on one side of his body.  His confusion seemed to improve but this morning when he awoke again he was confused again and having trouble speaking.  She denies any recent falls.  She denies any recent changes to his medications.  She denies any fever or recent illness.  Patient is extremely confused here and is difficult to get really any history from him.    REVIEW OF SYSTEMS  See HPI for further details.  Unable to obtain secondary to his altered mental status.    PAST MEDICAL HISTORY  Past Medical History:   Diagnosis Date   • Stroke (HCC) 2015    pt reports no residual weakness   • Renal disorder 8/4/2012    pyelonephritis   • Pneumonia 2008    Not hospitalized for it   • Angina 2005    episode of atrial fibrillation   • Arrhythmia     History of afib, no longer on medication   • CAD (coronary artery disease)     paroximal atrial tachycardia   • CATARACT     asim iol   • Dental disorder     lower dentures   • Macular degeneration of both eyes    • Unspecified urinary incontinence     dribbles       FAMILY HISTORY  [unfilled]    SOCIAL HISTORY  Social History     Socioeconomic History   • Marital status:      Spouse name: Not on file   • Number of children: Not on file   • Years of education: Not on file   • Highest education level: Not on file   Occupational History   • Not on file   Social Needs   • Financial resource strain: Not on file   • Food insecurity     Worry: Not on file     Inability: Not on file   • Transportation needs      "Medical: Not on file     Non-medical: Not on file   Tobacco Use   • Smoking status: Never Smoker   • Smokeless tobacco: Never Used   Substance and Sexual Activity   • Alcohol use: Yes     Alcohol/week: 2.4 - 3.0 oz     Types: 3 Standard drinks or equivalent, 1 - 2 Cans of beer per week     Frequency: 4 or more times a week     Drinks per session: 1 or 2   • Drug use: No   • Sexual activity: Never     Partners: Female   Lifestyle   • Physical activity     Days per week: Not on file     Minutes per session: Not on file   • Stress: Not on file   Relationships   • Social connections     Talks on phone: Not on file     Gets together: Not on file     Attends Yazidi service: Not on file     Active member of club or organization: Not on file     Attends meetings of clubs or organizations: Not on file     Relationship status: Not on file   • Intimate partner violence     Fear of current or ex partner: Not on file     Emotionally abused: Not on file     Physically abused: Not on file     Forced sexual activity: Not on file   Other Topics Concern   • Not on file   Social History Narrative   • Not on file       SURGICAL HISTORY  Past Surgical History:   Procedure Laterality Date   • CYSTOSCOPY  6/13/2016    Procedure: CYSTOSCOPY 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;  Surgeon: Bucky Bustamante M.D.;  Location: SURGERY [facility];  Service:    • CATARACT PHACO WITH IOL  10/28/2009    Performed by PAULA COE at SURGERY SAME DAY [facility] ORS   • CATARACT PHACO WITH IOL  10/14/2009    Performed by PAULA COE at SURGERY SAME DAY [facility] ORS   • OTHER ORTHOPEDIC SURGERY      PATELLA RIGHT       CURRENT MEDICATIONS   Home Medications    **Home medications have not yet been reviewed for this encounter**         ALLERGIES  Allergies   Allergen Reactions   • Bloodless Unspecified     Pt states he is willing to accept blood/blood products as \"a last resort\"   • Coufarin [Warfarin] Unspecified     Pts " "wife states \"He just does not like that drug\".   • Flomax [Tamsulosin] Unspecified     Pts wife states \"His BP goes really low and he fell down\"   • Heparin Hives, Rash and Itching     Pts wife states \"Rash all over body\".   • Lipitor [Atorvastatin] Rash     Pts wife states \"Rash all over body\".   • Macrobid [Kdc:Red Dye+Yellow Dye+Nitrofurantoin+Brilliant Blue Fcf] Nausea     Pt's wife states \"Makes pt weak and nausea\".       PHYSICAL EXAM  VITAL SIGNS: /84   Pulse 63   Temp 36.2 °C (97.1 °F) (Temporal)   Resp 16   Wt 68.2 kg (150 lb 5.7 oz)   SpO2 97%   BMI 21.57 kg/m²       Constitutional:  Well developed, No acute distress, Non-toxic appearance.   HENT: Normocephalic, Atraumatic, Bilateral external ears normal, Oropharynx moist  Eyes: PERRL, EOMI, Conjunctiva normal  Neck: Normal range of motion, No tenderness, Supple  Cardiovascular: Normal heart rate, Normal rhythm  Thorax & Lungs: Normal breath sounds, No respiratory distress, No wheezing, No chest tenderness.   Abdomen: Benign abdominal exam, no guarding no rebound, no tenderness, no distention  Skin: Warm, Dry, No erythema, No rash.   Back: No tenderness, No CVA tenderness.   Extremities: Intact distal pulses, No edema, No tenderness   Neurologic: Alert & oriented x 1, no facial asymmetry, good  strength bilaterally, good strength in lower extremities, unable to get a precise sensory exam as patient does not seem to be able to understand the question, no slurred speech but he does have jumbling of words.  Also unable to really evaluate pronator drift or rapid hand movements as patient is unable to follow these directions.  Was however able to remove his shirt with minimal assistance.   Psychiatric: appropriate      Labs  Results for orders placed or performed during the hospital encounter of 06/06/20   CBC WITH DIFFERENTIAL   Result Value Ref Range    WBC 11.9 (H) 4.8 - 10.8 K/uL    RBC 4.53 (L) 4.70 - 6.10 M/uL    Hemoglobin 14.2 14.0 - " 18.0 g/dL    Hematocrit 43.0 42.0 - 52.0 %    MCV 94.9 81.4 - 97.8 fL    MCH 31.3 27.0 - 33.0 pg    MCHC 33.0 (L) 33.7 - 35.3 g/dL    RDW 43.6 35.9 - 50.0 fL    Platelet Count 219 164 - 446 K/uL    MPV 9.3 9.0 - 12.9 fL    Neutrophils-Polys 72.10 (H) 44.00 - 72.00 %    Lymphocytes 13.40 (L) 22.00 - 41.00 %    Monocytes 12.40 0.00 - 13.40 %    Eosinophils 1.00 0.00 - 6.90 %    Basophils 0.70 0.00 - 1.80 %    Immature Granulocytes 0.40 0.00 - 0.90 %    Nucleated RBC 0.00 /100 WBC    Neutrophils (Absolute) 8.58 (H) 1.82 - 7.42 K/uL    Lymphs (Absolute) 1.59 1.00 - 4.80 K/uL    Monos (Absolute) 1.47 (H) 0.00 - 0.85 K/uL    Eos (Absolute) 0.12 0.00 - 0.51 K/uL    Baso (Absolute) 0.08 0.00 - 0.12 K/uL    Immature Granulocytes (abs) 0.05 0.00 - 0.11 K/uL    NRBC (Absolute) 0.00 K/uL   COMP METABOLIC PANEL   Result Value Ref Range    Sodium 130 (L) 135 - 145 mmol/L    Potassium 3.8 3.6 - 5.5 mmol/L    Chloride 97 96 - 112 mmol/L    Co2 21 20 - 33 mmol/L    Anion Gap 12.0 7.0 - 16.0    Glucose 106 (H) 65 - 99 mg/dL    Bun 17 8 - 22 mg/dL    Creatinine 0.76 0.50 - 1.40 mg/dL    Calcium 9.5 8.5 - 10.5 mg/dL    AST(SGOT) 23 12 - 45 U/L    ALT(SGPT) 14 2 - 50 U/L    Alkaline Phosphatase 98 30 - 99 U/L    Total Bilirubin 1.0 0.1 - 1.5 mg/dL    Albumin 4.1 3.2 - 4.9 g/dL    Total Protein 7.5 6.0 - 8.2 g/dL    Globulin 3.4 1.9 - 3.5 g/dL    A-G Ratio 1.2 g/dL   TROPONIN   Result Value Ref Range    Troponin T 18 6 - 19 ng/L   PROTHROMBIN TIME   Result Value Ref Range    PT 14.8 (H) 12.0 - 14.6 sec    INR 1.13 0.87 - 1.13   ESTIMATED GFR   Result Value Ref Range    GFR If African American >60 >60 mL/min/1.73 m 2    GFR If Non African American >60 >60 mL/min/1.73 m 2   ACCU-CHEK GLUCOSE   Result Value Ref Range    Glucose - Accu-Ck 95 65 - 99 mg/dL   EKG (NOW)   Result Value Ref Range    Report       [facility] Emergency Dept.    Test Date:  2020-06-06  Pt Name:    JEREL GROSS                Department: ER  MRN:         [id number]                      Room:        03  Gender:     Male                         Technician: [id number]  :        [id number]                   Requested By:LEILANI MCDANIEL  Order #:    [id number]                    Reading MD: Leilani Rodrigues    Measurements  Intervals                                Axis  Rate:       77                           P:  NH:                                      QRS:        -27  QRSD:       88                           T:          28  QT:         412  QTc:        467    Interpretive Statements  ATRIAL FIBRILLATION  BORDERLINE LEFT AXIS DEVIATION  EARLY PRECORDIAL R/S TRANSITION  Compared to ECG 2017 08:20:58  Right ventricular hypertrophy no longer present  Electronically Signed On 2020 11:03:42 PDT by Leilani Rodrigues         RADIOLOGY/PROCEDURES  DX-CHEST-PORTABLE (1 VIEW)   Final Result      Minimal bibasilar opacities likely atelectasis/scarring.      CT-HEAD W/O   Final Result      1.  Subacute appearing moderate left temporal lobe infarct. Brain MRI may be performed to further evaluate.   2.  Advanced chronic ischemic changes.   3.  No acute intracranial hemorrhage.          COURSE & MEDICAL DECISION MAKING    Pertinent Labs & Imaging studies reviewed. (See chart for details)  Patient presented to the emergency department as a possible stroke IR.  He arrived at approximately 10:00 and therefore he really is in a tight window for stroke IR.  The exact onset of his symptoms is unclear but wife noted that he seemed confused at about 11:00.  Therefore even the timings a little bit unknown.  He clearly is confused here.  I do not find any focal findings on neurologic exam.  However he definitely is doubling up his words.  There is no history of any recent falls.  There is no recent medication changes.  No history of recent illness and he is afebrile here and therefore I think infectious etiology is unlikely.  Wife says his baseline is A&O x3.  NIH is 5.  Patient is  unable to follow commands and level of consciousness patient is unable answer orientation questions.  Again sensory is a little difficult to tease out but I do not find any obvious sensory issues.  Patient did also lose 1 point for his aphasia.    CT has returned and shows evidence of a subacute left temporal lobe infarct.  Patient has a slightly elevated white count without evidence of bandemia.  Patient's sodium is 130.  He has normal renal function.    Patient will be admitted to the hospital for further imaging and evaluation.  Discussed the case with the hospitalist who will see the patient.  We will also discussed the case with neurology.    Discussed the case with Dr. Bartlett who will consult on the patient.  He does recommend urine lites, and serum osmol's and cautions on correcting the sodium too quickly.  This has been discussed with the hospitalist.    FINAL IMPRESSION     1. Altered mental status, unspecified altered mental status type    2. Aphasia    3. Acute CVA (cerebrovascular accident) (HCC)    4. Hyponatremia                 Electronically signed by: Leilani Meyers M.D., 6/6/2020 10:08 AM

## 2020-06-06 NOTE — CONSULTS
"Neurology STROKE CONSULT H&P  Neurohospitalist Service, [facility]iences    Referring Physician: Leilani Meyers M.D.    STROKE CODE:   Chief Complaint   Patient presents with   • ALOC   • Possible Stroke     Out of window for tPA candidacy.    HPI: Alistair Tavares is a 89 y.o. man for whom neurology was consulted for subacute stroke.  LKN symptom onset 11AM 6/5/20, upon awakening from a nap, with confusion, garbled speech, and disorientation without focal weakness.  At baseline is able to communicate without speech difficulty and ambulates.  Today in ED, patient has aphasia, able to assist with removing his shirt, and disoriented.  As documented by Dr. Rendon, 6/6/20, \"PMHx CAD, previous CVA, presents with altered mental status. CT shows a stroke [sic] Subacute appearing moderate left temporal lobe infarct.\"  Unable to obtain subjective history from the patient as his speech pathology is persisting.  The patient's wife at bedside has provided correlary: patient had a stroke \"a few years ago\" and doctors said it was from afib.  He has not been on AC because he didn't think he needed it, as his afib was paroxysmal \"came and went\" in nature.    Review of systems: Unable to obtain given the patient is currently aphasic    Past Medical History:    has a past medical history of Angina (2005), Arrhythmia, CAD (coronary artery disease), CATARACT, Dental disorder, Macular degeneration of both eyes, Pneumonia (2008), Renal disorder (8/4/2012), Stroke ([facility]) (2015), and Unspecified urinary incontinence. He also has no past medical history of Fall.    FHx:  family history includes Lung Disease in his father.    SHx:   reports that he has never smoked. He has never used smokeless tobacco. He reports current alcohol use of about 2.4 - 3.0 oz of alcohol per week. He reports that he does not use drugs.    Allergies:  Allergies   Allergen Reactions   • Bloodless Unspecified     Pt states he is willing to accept " "blood/blood products as \"a last resort\"   • Coufarin [Warfarin] Unspecified     Pts wife states \"He just does not like that drug\".   • Flomax [Tamsulosin] Unspecified     Pts wife states \"His BP goes really low and he fell down\"   • Heparin Hives, Rash and Itching     Pts wife states \"Rash all over body\".   • Lipitor [Atorvastatin] Rash     Pts wife states \"Rash all over body\".   • Macrobid [Kdc:Red Dye+Yellow Dye+Nitrofurantoin+Brilliant Blue Fcf] Nausea     Pt's wife states \"Makes pt weak and nausea\".       Medications:  No current facility-administered medications for this encounter.     Current Outpatient Medications:   •  aspirin EC (ECOTRIN) 81 MG Tablet Delayed Response, Take 81 mg by mouth every day., Disp: , Rfl:   •  therapeutic multivitamin-minerals (THERAGRAN-M) Tab, Take 1 Tab by mouth every day., Disp: , Rfl:   •  vitamin D (CHOLECALCIFEROL) 1000 Unit (25 mcg) Tab, Take 1,000 Units by mouth every day., Disp: , Rfl:   •  Cyanocobalamin (VITAMIN B-12 PO), Take 1 Dose by mouth every day. Unknown OTC Strength., Disp: , Rfl:   •  finasteride (PROSCAR) 5 MG Tab, Take 5 mg by mouth every evening., Disp: , Rfl:   •  CALCIUM-MAGNESIUM-ZINC PO, Take 1 Tab by mouth every day. Unknown OTC Strength., Disp: , Rfl:   •  B Complex Vitamins (VITAMIN-B COMPLEX PO), Take 1 Tab by mouth every day. Unknown OTC Strength., Disp: , Rfl:     Physical Examination:    Vitals:    06/06/20 1026 06/06/20 1101 06/06/20 1105 06/06/20 1201   BP:  157/67  130/74   Pulse: 76 76 69 80   Resp: 17  16 20   Temp:       TempSrc:       SpO2: 93% 90% 94% 98%   Weight:           General: Patient is awake and in no acute distress  Eyes: examination of optic disks not indicated at this time  CV: RRR    NEUROLOGICAL EXAM:     Mental status: Awake, disoriented, follows simply midline but not complex nor appendicular commands  Speech and language: speech is nonsensical. The patient is unable to name and repeat, making semantic paraphasic errors such " "as \"cigarettes\" instead of \"fingers\"  Cranial nerve exam: Pupils are equal, round and reactive to light bilaterally. Right homonymous hemianopsia. Extraocular muscles are intact. Sensation in the face is intact to light touch. Face is symmetric. Hearing to finger rub equal. Palate elevates symmetrically. Shoulder shrug is full. Tongue is midline.  Motor exam: Strength is 5/5 in all extremities both distally and proximally. Tone is normal. No abnormal movements were seen on exam.  Sensory exam: No sensory deficits identified   Deep tendon reflexes:  1+ and symmetric. Toes down-going bilaterally.  Coordination: no ataxia   Gait: deferred per patient's lack of understanding directions    NIH Stroke Scale:    1a. Level of Consciousness (Alert, drowsy, etc): 0= Alert    1b. LOC Questions (Month, age): 2= Incorrect    1c. LOC Commands (Open/close eyes make fist/let go): 1= Obeys one correctly    2.   Best Gaze (Eyes open - patient follows examiner's finger on face): 0= Normal    3.   Visual Fields (introduce visual stimulus/threat to patient's field quadrants): 2= Complete Hemianopia  4.   Facial Paresis (Show teeth, raise eyebrows and squeeze eyes shut): 0= Normal     5a. Motor Arm - Left (Elevate arm to 90 degrees if patient is sitting, 45 degrees if  supine): 0= No drift    5b. Motor Arm - Right (Elevate arm to 90 degrees if patient is sitting, 45 degrees if supine): 0= No drift    6a. Motor Leg - Left (Elevate leg 30 degrees with patient supine): 0= No drift    6b. Motor Leg - Right  (Elevate leg 30 degrees with patient supine): 0= No drift    7.   Limb Ataxia (Finger-nose, heel down shin): 0= No ataxia    8.   Sensory (Pin prick to face, arm, trunk and leg - compare side to side): 0= Normal    9.  Best Language (Name item, describe a picture and read sentences): 1= Mild to moderate aphasia    10. Dysarthria (Evaluate speech clarity by patient repeating listed words): 0= Normal articulation    11. Extinction and " Inattention (Use information from prior testing to identify neglect or  double simultaneous stimuli testing): 0= No neglect    Total NIH Score: 6    Objective Data:    Labs:  Lab Results   Component Value Date/Time    PROTHROMBTM 14.8 (H) 06/06/2020 10:05 AM    INR 1.13 06/06/2020 10:05 AM      Lab Results   Component Value Date/Time    WBC 11.9 (H) 06/06/2020 10:05 AM    RBC 4.53 (L) 06/06/2020 10:05 AM    HEMOGLOBIN 14.2 06/06/2020 10:05 AM    HEMATOCRIT 43.0 06/06/2020 10:05 AM    MCV 94.9 06/06/2020 10:05 AM    MCH 31.3 06/06/2020 10:05 AM    MCHC 33.0 (L) 06/06/2020 10:05 AM    MPV 9.3 06/06/2020 10:05 AM    NEUTSPOLYS 72.10 (H) 06/06/2020 10:05 AM    LYMPHOCYTES 13.40 (L) 06/06/2020 10:05 AM    MONOCYTES 12.40 06/06/2020 10:05 AM    EOSINOPHILS 1.00 06/06/2020 10:05 AM    BASOPHILS 0.70 06/06/2020 10:05 AM    ANISOCYTOSIS 1+ 08/12/2016 03:12 AM      Lab Results   Component Value Date/Time    SODIUM 130 (L) 06/06/2020 10:05 AM    POTASSIUM 3.8 06/06/2020 10:05 AM    CHLORIDE 97 06/06/2020 10:05 AM    CO2 21 06/06/2020 10:05 AM    GLUCOSE 106 (H) 06/06/2020 10:05 AM    BUN 17 06/06/2020 10:05 AM    CREATININE 0.76 06/06/2020 10:05 AM    CREATININE 1.0 12/01/2006 07:15 PM      Lab Results   Component Value Date/Time    CHOLSTRLTOT 123 03/09/2016 02:33 AM    LDL 71 03/09/2016 02:33 AM    HDL 40 03/09/2016 02:33 AM    TRIGLYCERIDE 59 03/09/2016 02:33 AM       Lab Results   Component Value Date/Time    ALKPHOSPHAT 98 06/06/2020 10:05 AM    ASTSGOT 23 06/06/2020 10:05 AM    ALTSGPT 14 06/06/2020 10:05 AM    TBILIRUBIN 1.0 06/06/2020 10:05 AM        Imaging/Testing:    I interpreted and/or reviewed the patient's neuroimaging    DX-CHEST-PORTABLE (1 VIEW)   Final Result      Minimal bibasilar opacities likely atelectasis/scarring.      CT-HEAD W/O   Final Result      1.  Subacute appearing moderate left temporal lobe infarct. Brain MRI may be performed to further evaluate.   2.  Advanced chronic ischemic changes.   3.   No acute intracranial hemorrhage.        MRI brain 6/6/20 on my read shows restricted diffusion with ADC correlate in the left temporal lobe and evidence of prior infarction in the right hemisphere on FLAIR sequence.  No hemorrhage in territory of acute infarct on GRE.    Assessment and Plan:    Alistair Tavares is a 89 y.o. an with relevant history of CAD, prior stroke (secondary to afib per wife), presenting for whom neurology was consulted to address subacute onset of aphasia with CT imaging  6/6/20 revealing a hypodensity in the left temporal lobe consistent with infarction.  Patient is out of the window to be considered a candidate for acute stroke intervention.  Ddx for etiology includes large vessel disease vs. Cardioembolic.  I am concerned about the patient's hyponatremia; ddx includes SIADH, cerebral salt wasting, and should not be corrected faster than 8mEq over the next 24hr.     Plan:    - Admit to the hospital for further workup of etiology and to provide secondary stroke prevention measures  - primary team to confirm diagnosis of afib  - workup etiology of hyponatremia: urine lytes, serum osm, DO NOT CORRECT TOO QUICKLY - recommendation relayed to Dr. Meyers via phone call  - Neurology checks and vital signs per protocol  - Permissive HTN for 24-48 hours from onset of symptoms followed by goal BP <140 systolic. Long term BP goal (s/p 1-2 weeks from onset) is normotension  - obtain normoglycemia and avoid hypo- or hyper -natremia; aim for normothermia  - secondary stroke prevention therapy with ASA 325mg qd monotherapy for now; will need at least 81mg regardless of telemetry results for CAD  - pending swallow study; will order ASA 300mg pr x1 now  - high intensity statin per SPARCL Trial  --> EMR notes allergy to Lipitor; primary team to confirm if true allergy as benefits may outweigh risk  - serum studies for stroke risk factors: lipid panel & hemoglobin A1C  - evaluation of cerebral bloodflow,  obtain CTA head and neck with and without contrast  - Obtain a 2D echocardiogram w/ bubble study  --> if patient has nonvalvular afib, a NOAC is indicated for confirmed afib; warfarin for valvular afib; will continue ASA regardless for CAD  - RESPECT-ESUS and NAVIGATE-ESUS Trials recommend against empiric anticoagulation when suspicion for cardioembolic stroke is high in the absence of definitive diagnosis of afib  - evaluate and treat with PT/OT/ST    The evaluation of the patient, and recommended management, was discussed with Dr. Meyers.    Miguel Griffin MD  Medical Director, Comprehensive Stroke [facility], [facility]  Neurohospitalist, & Vascular Neurology, [facility]  Clinical  of Neurology, [employer]

## 2020-06-07 ENCOUNTER — APPOINTMENT (OUTPATIENT)
Dept: CARDIOLOGY | Facility: MEDICAL CENTER | Age: 85
DRG: 065 | End: 2020-06-07
Attending: HOSPITALIST
Payer: COMMERCIAL

## 2020-06-07 ENCOUNTER — HOSPITAL ENCOUNTER (INPATIENT)
Facility: REHABILITATION | Age: 85
End: 2020-06-07
Attending: PHYSICAL MEDICINE & REHABILITATION | Admitting: PHYSICAL MEDICINE & REHABILITATION
Payer: COMMERCIAL

## 2020-06-07 LAB
ANION GAP SERPL CALC-SCNC: 13 MMOL/L (ref 7–16)
BUN SERPL-MCNC: 15 MG/DL (ref 8–22)
CALCIUM SERPL-MCNC: 9.6 MG/DL (ref 8.5–10.5)
CHLORIDE SERPL-SCNC: 101 MMOL/L (ref 96–112)
CHOLEST SERPL-MCNC: 155 MG/DL (ref 100–199)
CO2 SERPL-SCNC: 21 MMOL/L (ref 20–33)
CREAT SERPL-MCNC: 0.77 MG/DL (ref 0.5–1.4)
GLUCOSE SERPL-MCNC: 92 MG/DL (ref 65–99)
HDLC SERPL-MCNC: 59 MG/DL
LDLC SERPL CALC-MCNC: 83 MG/DL
LV EJECT FRACT  99904: 70
LV EJECT FRACT MOD 2C 99903: 71.69
LV EJECT FRACT MOD 4C 99902: 67.76
LV EJECT FRACT MOD BP 99901: 71.63
POTASSIUM SERPL-SCNC: 4.4 MMOL/L (ref 3.6–5.5)
SODIUM SERPL-SCNC: 135 MMOL/L (ref 135–145)
TRIGL SERPL-MCNC: 67 MG/DL (ref 0–149)

## 2020-06-07 PROCEDURE — 97166 OT EVAL MOD COMPLEX 45 MIN: CPT

## 2020-06-07 PROCEDURE — 99233 SBSQ HOSP IP/OBS HIGH 50: CPT | Performed by: PSYCHIATRY & NEUROLOGY

## 2020-06-07 PROCEDURE — 700102 HCHG RX REV CODE 250 W/ 637 OVERRIDE(OP): Performed by: INTERNAL MEDICINE

## 2020-06-07 PROCEDURE — 80048 BASIC METABOLIC PNL TOTAL CA: CPT

## 2020-06-07 PROCEDURE — 700111 HCHG RX REV CODE 636 W/ 250 OVERRIDE (IP): Performed by: INTERNAL MEDICINE

## 2020-06-07 PROCEDURE — A9270 NON-COVERED ITEM OR SERVICE: HCPCS | Performed by: PSYCHIATRY & NEUROLOGY

## 2020-06-07 PROCEDURE — 700102 HCHG RX REV CODE 250 W/ 637 OVERRIDE(OP): Performed by: PSYCHIATRY & NEUROLOGY

## 2020-06-07 PROCEDURE — A9270 NON-COVERED ITEM OR SERVICE: HCPCS | Performed by: INTERNAL MEDICINE

## 2020-06-07 PROCEDURE — 80061 LIPID PANEL: CPT

## 2020-06-07 PROCEDURE — A9270 NON-COVERED ITEM OR SERVICE: HCPCS | Performed by: HOSPITALIST

## 2020-06-07 PROCEDURE — 36415 COLL VENOUS BLD VENIPUNCTURE: CPT

## 2020-06-07 PROCEDURE — 99233 SBSQ HOSP IP/OBS HIGH 50: CPT | Performed by: INTERNAL MEDICINE

## 2020-06-07 PROCEDURE — 93306 TTE W/DOPPLER COMPLETE: CPT | Mod: 26 | Performed by: INTERNAL MEDICINE

## 2020-06-07 PROCEDURE — 93306 TTE W/DOPPLER COMPLETE: CPT

## 2020-06-07 PROCEDURE — 92610 EVALUATE SWALLOWING FUNCTION: CPT

## 2020-06-07 PROCEDURE — 700102 HCHG RX REV CODE 250 W/ 637 OVERRIDE(OP): Performed by: HOSPITALIST

## 2020-06-07 PROCEDURE — 770020 HCHG ROOM/CARE - TELE (206)

## 2020-06-07 PROCEDURE — 97162 PT EVAL MOD COMPLEX 30 MIN: CPT

## 2020-06-07 RX ORDER — DIPHENHYDRAMINE HYDROCHLORIDE 50 MG/ML
25 INJECTION INTRAMUSCULAR; INTRAVENOUS EVERY 6 HOURS PRN
Status: DISCONTINUED | OUTPATIENT
Start: 2020-06-07 | End: 2020-06-10 | Stop reason: HOSPADM

## 2020-06-07 RX ORDER — ROSUVASTATIN CALCIUM 5 MG/1
10 TABLET, COATED ORAL EVERY EVENING
Status: DISCONTINUED | OUTPATIENT
Start: 2020-06-07 | End: 2020-06-07

## 2020-06-07 RX ORDER — HALOPERIDOL 5 MG/ML
1 INJECTION INTRAMUSCULAR EVERY 6 HOURS PRN
Status: DISCONTINUED | OUTPATIENT
Start: 2020-06-07 | End: 2020-06-08

## 2020-06-07 RX ADMIN — ASPIRIN 325 MG: 325 TABLET, FILM COATED ORAL at 13:44

## 2020-06-07 RX ADMIN — DIPHENHYDRAMINE HYDROCHLORIDE 25 MG: 50 INJECTION, SOLUTION INTRAMUSCULAR; INTRAVENOUS at 21:25

## 2020-06-07 RX ADMIN — ROSUVASTATIN CALCIUM 10 MG: 5 TABLET, FILM COATED ORAL at 16:59

## 2020-06-07 RX ADMIN — FINASTERIDE 5 MG: 5 TABLET, FILM COATED ORAL at 16:59

## 2020-06-07 RX ADMIN — ENOXAPARIN SODIUM 40 MG: 100 INJECTION SUBCUTANEOUS at 13:44

## 2020-06-07 ASSESSMENT — COGNITIVE AND FUNCTIONAL STATUS - GENERAL
CLIMB 3 TO 5 STEPS WITH RAILING: A LOT
MOVING FROM LYING ON BACK TO SITTING ON SIDE OF FLAT BED: A LOT
MOVING TO AND FROM BED TO CHAIR: A LITTLE
PERSONAL GROOMING: A LITTLE
SUGGESTED CMS G CODE MODIFIER MOBILITY: CK
MOBILITY SCORE: 15
DRESSING REGULAR UPPER BODY CLOTHING: A LITTLE
STANDING UP FROM CHAIR USING ARMS: A LITTLE
HELP NEEDED FOR BATHING: A LITTLE
EATING MEALS: A LITTLE
TURNING FROM BACK TO SIDE WHILE IN FLAT BAD: A LOT
WALKING IN HOSPITAL ROOM: A LITTLE
SUGGESTED CMS G CODE MODIFIER DAILY ACTIVITY: CK
TOILETING: A LITTLE
DRESSING REGULAR LOWER BODY CLOTHING: A LITTLE
DAILY ACTIVITIY SCORE: 18

## 2020-06-07 ASSESSMENT — ACTIVITIES OF DAILY LIVING (ADL): TOILETING: UNABLE TO DETERMINE AT THIS TIME

## 2020-06-07 ASSESSMENT — GAIT ASSESSMENTS
GAIT LEVEL OF ASSIST: MINIMAL ASSIST
DISTANCE (FEET): 125
ASSISTIVE DEVICE: FRONT WHEEL WALKER;OTHER (COMMENTS)

## 2020-06-07 NOTE — PROGRESS NOTES
Neurology Progress Note  Neurohospitalist Service, [facility]    Referring Physician: Elsie Aly M.D.    Chief Complaint   Patient presents with   • ALOC   • Possible Stroke       HPI: Refer to initial documented Neurology H&P, as detailed in the patient's chart.    Interval History: No acute events overnight.  Patient with no complaints this AM.  Denies headache.  In afib.  Sodium normalized.    Review of systems: In addition to what is detailed in the HPI and/or updated in the interval history, all other systems reviewed and are negative.    Past Medical History:    has a past medical history of Angina (2005), Arrhythmia, CAD (coronary artery disease), CATARACT, Dental disorder, Macular degeneration of both eyes, Pneumonia (2008), Renal disorder (8/4/2012), Stroke ([facility]) (2015), and Unspecified urinary incontinence. He also has no past medical history of Fall.    FHx:  family history includes Lung Disease in his father.    SHx:   reports that he has never smoked. He has never used smokeless tobacco. He reports current alcohol use of about 2.4 - 3.0 oz of alcohol per week. He reports that he does not use drugs.    Medications:    Current Facility-Administered Medications:   •  aspirin (ASA) tablet 325 mg, 325 mg, Oral, DAILY, Miguel Griffin M.D.  •  finasteride (PROSCAR) tablet 5 mg, 5 mg, Oral, Q EVENING, Christopher Joiner D.O.  •  senna-docusate (PERICOLACE or SENOKOT S) 8.6-50 MG per tablet 2 Tab, 2 Tab, Oral, BID, Stopped at 06/07/20 0900 **AND** polyethylene glycol/lytes (MIRALAX) PACKET 1 Packet, 1 Packet, Oral, QDAY PRN **AND** magnesium hydroxide (MILK OF MAGNESIA) suspension 30 mL, 30 mL, Oral, QDAY PRN **AND** bisacodyl (DULCOLAX) suppository 10 mg, 10 mg, Rectal, QDAY PRN, Christopher Joiner D.O.  •  Pharmacy consult request - Allow for permissive hypertension: SBP up to 220 mmHg/DBP up to 120 mmHg x 48 hours, , Other, PHARMACY TO DOSE, Christopher Joiner,  D.O.  •  acetaminophen (TYLENOL) tablet 650 mg, 650 mg, Oral, Q6HRS PRN, Christopher Joiner D.O.  •  ondansetron (ZOFRAN) syringe/vial injection 4 mg, 4 mg, Intravenous, Q4HRS PRN, Christopher Joiner D.O.  •  ondansetron (ZOFRAN ODT) dispertab 4 mg, 4 mg, Oral, Q4HRS PRN, Christopher Joiner D.O.    Physical Examination:     Vitals:    06/06/20 2000 06/07/20 0000 06/07/20 0400 06/07/20 0800   BP: 135/80 140/93 154/105 142/92   Pulse: 78 84 65 95   Resp: 16 18 20 18   Temp: 36.7 °C (98.1 °F) 36.8 °C (98.3 °F) 36.7 °C (98 °F) 36.4 °C (97.6 °F)   TempSrc: Temporal Temporal Temporal Temporal   SpO2: 92% 91% 95% 94%   Weight:           General: Patient is awake and in no acute distress  Eyes: examination of optic disks not indicated at this time  CV: irregularly irregular    NEUROLOGICAL EXAM:     Mental status: Awake, disoriented, follows simple but not complex nor embedded commands  Speech and language: speech is not dysarthric. The patient is able to name high frequency but not low frequency items. Can repeat simple but not complex phrases, making enumerate paraphasic errors  Cranial nerve exam: Pupils are equal, round and reactive to light bilaterally. Right homonymous hemianopsia resolved today compared to 6/6; blinks to threat b/l. Extraocular muscles are intact. Sensation in the face is intact to light touch. Face is symmetric. Hearing to finger rub equal. Palate elevates symmetrically. Shoulder shrug is full. Tongue is midline.  Motor exam: Strength is 5/5 in all extremities both distally and proximally.  No orbiting nor pronator drift. Paratonia. No abnormal movements were seen on exam.  Sensory exam: No sensory deficits identified   Deep tendon reflexes:  1+ and symmetric. Toes down-going bilaterally.  Coordination: no ataxia   Gait: deferred in the setting of aphasia and safety    Objective Data:    Labs:  Lab Results   Component Value Date/Time    PROTHROMBTM 14.8 (H) 06/06/2020 10:05 AM    INR  1.13 06/06/2020 10:05 AM      Lab Results   Component Value Date/Time    WBC 11.9 (H) 06/06/2020 10:05 AM    RBC 4.53 (L) 06/06/2020 10:05 AM    HEMOGLOBIN 14.2 06/06/2020 10:05 AM    HEMATOCRIT 43.0 06/06/2020 10:05 AM    MCV 94.9 06/06/2020 10:05 AM    MCH 31.3 06/06/2020 10:05 AM    MCHC 33.0 (L) 06/06/2020 10:05 AM    MPV 9.3 06/06/2020 10:05 AM    NEUTSPOLYS 72.10 (H) 06/06/2020 10:05 AM    LYMPHOCYTES 13.40 (L) 06/06/2020 10:05 AM    MONOCYTES 12.40 06/06/2020 10:05 AM    EOSINOPHILS 1.00 06/06/2020 10:05 AM    BASOPHILS 0.70 06/06/2020 10:05 AM    ANISOCYTOSIS 1+ 08/12/2016 03:12 AM      Lab Results   Component Value Date/Time    SODIUM 135 06/07/2020 03:22 AM    POTASSIUM 4.4 06/07/2020 03:22 AM    CHLORIDE 101 06/07/2020 03:22 AM    CO2 21 06/07/2020 03:22 AM    GLUCOSE 92 06/07/2020 03:22 AM    BUN 15 06/07/2020 03:22 AM    CREATININE 0.77 06/07/2020 03:22 AM    CREATININE 1.0 12/01/2006 07:15 PM      Lab Results   Component Value Date/Time    CHOLSTRLTOT 155 06/07/2020 03:22 AM    LDL 83 06/07/2020 03:22 AM    HDL 59 06/07/2020 03:22 AM    TRIGLYCERIDE 67 06/07/2020 03:22 AM       Lab Results   Component Value Date/Time    ALKPHOSPHAT 98 06/06/2020 10:05 AM    ASTSGOT 23 06/06/2020 10:05 AM    ALTSGPT 14 06/06/2020 10:05 AM    TBILIRUBIN 1.0 06/06/2020 10:05 AM        Imaging/Testing:    I interpreted and/or reviewed the patient's neuroimaging    CT-CTA HEAD WITH & W/O-POST PROCESS   Final Result      No proximal intracranial arterial occlusion or hemodynamically significant stenosis.      CT-CTA NECK WITH & W/O-POST PROCESSING   Final Result      1.  Calcific atherosclerosis is noted in the aorta carotid arteries and branch vessels of the aorta with some interval increase compared to the prior exam.      2.  There is 50% diameter reduction narrowing at the origin of the right internal carotid artery due to presence of calcified atherosclerotic plaque in the proximal ICA which extends superiorly from the  right carotid bulb. Narrowing at this location is    increased compared to the prior exam.      3.  No evidence of significant stenosis in the left ICA. Diameter reduction between 0% and 49%.      4.  No vertebral artery stenosis or occlusion is identified.      MR-BRAIN-W/O   Final Result      1.  Small area of acute infarct in the left temporal and parietal lobes.   2.  Chronic infarcts in the right frontal lobes.   3.  Moderate chronic microvascular ischemic disease.   4.  Moderate cerebral volume loss.      DX-CHEST-PORTABLE (1 VIEW)   Final Result      Minimal bibasilar opacities likely atelectasis/scarring.      CT-HEAD W/O   Final Result      1.  Subacute appearing moderate left temporal lobe infarct. Brain MRI may be performed to further evaluate.   2.  Advanced chronic ischemic changes.   3.  No acute intracranial hemorrhage.      EC-ECHOCARDIOGRAM COMPLETE W/O CONT    (Results Pending)       Assessment and Plan:    Alistair Tavares is a 89 y.o. an with relevant history of CAD, prior stroke (secondary to afib per wife), presenting for whom neurology was consulted to address subacute onset of aphasia with CT imaging  6/6/20 revealing a hypodensity in the left temporal lobe consistent with infarction.  Patient is out of the window to be considered a candidate for acute stroke intervention.  Ddx for etiology includes large vessel disease vs. Cardioembolic.  I am concerned about the patient's hyponatremia; ddx includes SIADH, cerebral salt wasting, and should not be corrected faster than 8mEq over the next 24hr.      Plan:     - Neurology checks and vital signs per protocol  - goal BP <140 systolic. Long term BP goal (s/p 1-2 weeks from onset) is normotension  - obtain normoglycemia and avoid hypo- or hyper -natremia; aim for normothermia  - secondary stroke prevention therapy with ASA for now; will need at least 81mg for CAD  - START high intensity statin per SPARCL Trial; EMR notes allergy to Lipitor;  primary team to confirm if true allergy as benefits may outweigh risk  --> if true allergy to Lipitor, consider a different medication in statin class with dose equivalent to 40mg Lipitor; may also consider PCSK9 inhibitor  - Obtain a 2D echocardiogram w/ bubble study  --> if patient has nonvalvular afib, a NOAC is indicated for confirmed afib; warfarin for valvular afib; will continue ASA regardless for CAD  --> when AC is initiated, change ASA dose from 325mg to 81mg and continue indefinitely  - evaluate and treat with PT/OT/ST    The evaluation of the patient, and recommended management, was discussed with Dr. Aly at bedside. I have performed a physical exam and reviewed and updated ROS and Plan today (6/7/2020). In review of yesterday's note (6/6/2020), there are no changes except as documented above.    Miguel Griffin MD  Medical Director, [facility], [facility]  Neurohospitalist, & Vascular Neurology, [facility]  Clinical  of Neurology, [employer] [facility] [employer]

## 2020-06-07 NOTE — CARE PLAN
Problem: Communication  Goal: The ability to communicate needs accurately and effectively will improve  Outcome: PROGRESSING AS EXPECTED   Pt is aphasic; A&Ox zero. Frequently reoriented. Hourly rounding in place  Problem: Safety  Goal: Will remain free from falls  Outcome: PROGRESSING AS EXPECTED   Bed locked in lowest position, bed alarm on. Call light in reach. Hourly Rounding in place.

## 2020-06-07 NOTE — THERAPY
"Speech Language Pathology   Clinical Swallow Evaluation     Patient Name: Alistair Tavares  AGE:  89 y.o., SEX:  male  Medical Record #: [id number]  Today's Date: 6/7/2020     Precautions  Precautions: Swallow Precautions ( See Comments)  Comments: pt demonstrates expressive and receptive aphasia    Assessment    89 YO male admitted 6/6/2020 2/2 confusion. PMHx includes angina, arrhythmia, CAD, dental disorder, macular degeneration, pneumonia, renal disorder, stroke. CMHx includes PAF, hyponatremia, acute ischemic left MCA stroke. CXR 6/6/20 \"Minimal bibasilar opacities likely atelectasis/scarring.\" Brain MRI 6/6/20 showed \"Small area of acute infarct in the left temporal and parietal lobes.\"    Pt seen this date for clinical swallow evaluation 2/2 L MCA CVA. Pt was awake, alert, aphasic and followed basic directions with verbal and gestural cues. PO trials of MTL, soft and bite sized, regular and thins assessed. Ice and puree not assessed as pt refuses to consume cold food or drink. Hyolaryngeal elevation palpated as complete, pt presents with oral holding 2-3 seconds prior to swallow initiation, and vocal quality remained clear throughout evaluation. Pt demonstrated functional mastication with no oral residue with soft and bite sized trials. Mild-mod oral residue appreciated with trials of regular, cleared with cues to utilized liquids wash. No other s/sx of aspiration appreciated with any other consistencies consumed.     Recommend initiation of a soft and bite sized with thin liquid (SB6/TN0) diet with adherence to the following strategies: upright at 90* for PO, direct supervision, alternate liquids and solids, no straws, slow rate and small bites. SLP following.     Recommend aphasia evaluation.    Plan    Recommend Speech Therapy 5 times per week until therapy goals are met for the following treatments:  Dysphagia Training and Patient / Family / Caregiver Education.    Discharge recommendations:  Recommend " inpatient transitional care services for continued speech therapy services.       Objective       06/07/20 1005   Oral Motor Eval    Is Patient Able to Complete Oral Motor Eval Yes but Impaired   Labial Function   Labial Structure At Rest Minimal   Labial Vowel Production / I /, / U / Within Functional Limits   Labial Sequence / I /, / U / Within Functional Limits   Frown, Pucker Within Functional Limits   Lingual Function   Lingual Structure At Rest Within Functional Limits   Lingual Protrude Within Functional Limits   Lingual Retract Within Functional Limits   Elevate In Mouth Within Functional Limits   Elevate Outside Mouth Within Functional Limits   Lateralization No Impairment Right;No Impairment Left   Lick Palate No Impairment   / Pa / 5X's Minimal   / Ta / 5X's Minimal   / Ka / 5X's Minimal   / [place] / 5X's Minimal   Jaw   Jaw Structure At Rest Within Functional Limits   Bite ([place]) Within Functional Limits   Chew ([place]) Minimal   Velar Function   Velar Structure At Rest Within Functional Limits   / A / Prolonged Within Functional Limits   / A /  5X's Within Functional Limits   Gag / Palatal Reflex Not Tested   Laryngeal Function   Voice Quality Within Functional Limits   Volutional Cough Within Functional Limits   Excursion Upon Swallow Complete   Oral Food Presentation   Ice Chips Not Tested   Single Swallow Mildly Thick (2) - (Nectar Thick)  Within Functional Limits   Single Swallow Thin (0) Within Functional Limits   Pureed (4) Not Tested  (Pt averse to cold food and liquid, pt refusing trials)   Soft & Bite-Sized (6) - (Dysphagia III) Within Functional Limits   Regular (7) Minimal  (mod oral residue, cleared with liquid wash)   Regular-Easy to Chew (7) Not Tested   Self Feeding Independent   Tracheostomy   Tracheostomy  No   Dysphagia Strategies / Recommendations   Strategies / Interventions Recommended (Yes / No) Yes   Compensatory Strategies Direct Supervision During Meals;Head of Bed 90  Degrees During Eating / Drinking;No Straws;Alternate Solids / Liquids;Single Sips / Bites   Diet / Liquid Recommendation Thin (0);Soft & Bite-Sized (6) - (Dysphagia III)   Medication Administration  Float Whole with Puree   Therapy Interventions Dysphagia Therapy By Speech Language Pathologist   Short Term Goals   Short Term Goal # 1 Pt will consume a diet of SB6/TN0 with no s/sx of aspiration and min cues.

## 2020-06-07 NOTE — DISCHARGE PLANNING
[facility] Transitional Care Coordination  Referral from:  Dr. Christopher Joiner   Facesheet indicates:  [facility] prime/ Medicare       Potential Rehab Diagnosis:   L cva  - CT head showed hypodensity in the left temporal lobe consistent with infarction and MRI brain confirmed left acute ischemic parietal and temporal infarct.        Chart review indicates patient does have on going medical management (afib, CAD, hx of CVA)  and does have therapy needs to possibly meet inpatient rehab facility criteria with the goal of returning to community.    D/C support: Lives w/ spouse Rekha Tavares  in an apartment in [place]      Physiatry consultation Forwarded per protocol.      Thank you for the referral.

## 2020-06-07 NOTE — CARE PLAN
Problem: Safety  Goal: Will remain free from injury  Outcome: PROGRESSING AS EXPECTED  Intervention: Provide assistance with mobility  Note: Assisted pt up to bathroom d/t weakness from stroke, unsteady gait, free from fall

## 2020-06-07 NOTE — THERAPY
Physical Therapy   Initial Evaluation     Patient Name: Alistair Tavares  Age:  89 y.o., Sex:  male  Medical Record #: [id number]  Today's Date: 6/7/2020     Precautions: Swallow Precautions, Fall risk, Impulsive, Aphasia    Assessment  Patient is 89 y.o. male admitted for possible CVA with ALOC and aphasia.  At time of initial PT eval, pt presented with receptive and expressive aphasia speaking in word salad. Unable to collect prior level or home set up. No strength or coordination deficits noted. Pt required min assist for transfers and ambulating due to balance deficits and safety awareness.  Pt required max cues for safety but continued to demonstrate some impulsive behaviors. Pt is primarily limited in functional mobility due to balance deficits and safety awareness. PT will continue to work with patient to address above deficits.     Plan    Recommend Physical Therapy 5 times per week until therapy goals are met for the following treatments:  Community Re-integration, Gait Training, Neuro Re-Education / Balance, Self Care/Home Evaluation, Stair Training, Therapeutic Activities and Therapeutic Exercises    Discharge recommendations:  Need to clarify prior level and assistance at home. Pending pts progress with acute PT, likely recommend home health transitional care for continued physical therapy services.        06/07/20 0950   Prior Living Situation   Prior Services Unable To Determine At This Time   Housing / Facility Unable To Determine At This Time   Lives with - Patient's Self Care Capacity Spouse   Comments unable to collect home set up due to aphasia   Prior Level of Functional Mobility   Bed Mobility Unable To Determine At This Time   Comments unable to collect prior level   Cognition    Cognition / Consciousness X   Speech/ Communication Expressive Aphasia;Receptive Aphasia   Level of Consciousness Confused   Safety Awareness Impaired;Impulsive   Comments not following safety cues'   Gait Analysis    Gait Level Of Assist Minimal Assist   Assistive Device Front Wheel Walker;Other (Comments)  (min with FWW and with no AD)   Distance (Feet) 125   # of Times Distance was Traveled 1   Deviation Shuffled Gait;Step To;Other (Comment)  (large steps and throwing FWW ahead of him)   # of Stairs Climbed 0   Weight Bearing Status FWB   Skilled Intervention Verbal Cuing;Compensatory Strategies   Comments Pt required assistance safely maneuvering FWW. Pt was using FWW with  technique with significant kyphosis   Bed Mobility    Supine to Sit Supervised   Sit to Supine Supervised   Scooting Supervised   Functional Mobility   Sit to Stand Minimal Assist   Mobility in room and hallway   Short Term Goals    Short Term Goal # 1 Pt will be able to ambulate with LRAD and SPV in 6tx in order to return home.   Short Term Goal # 2 Pt will be able to complete functional transfers with LRAD and SPV in 6tx in order to return home   Anticipated Discharge Equipment   DC Equipment Unable To Determine At This Time

## 2020-06-07 NOTE — ASSESSMENT & PLAN NOTE
Neurology consulted.   Underlying pA-Fib, has not been on anticoagulation  On Eliquis and ASA. Allergic to statins  PT/OT

## 2020-06-07 NOTE — THERAPY
"Occupational Therapy   Initial Evaluation     Patient Name: Alistair Tavares  Age:  89 y.o., Sex:  male  Medical Record #: [id number]  Today's Date: 6/7/2020     Precautions: Fall Risk, Swallow Precautions  Comments: expressive and receptive aphasia per SLP, SOURAV taylor    Assessment  Patient is 89 y.o. male admitted with AMS and speech difficulties, found to have acute L ischemic parietal and temporal infarct.  Additional factors influencing patient status / progress: hx CAD, CVA, and Afib.  Pt presents to OT eval difficult to understand due to word salad type expressive aphasia but agreeable to OOB activity. Pt demonstrated adequate reach to adjust socks however attention to task impaired his independence. Pt required [place] for functional mobiltiy w/FWW for safety 2/2 impulsivity and decreased direction following for safe FWW mgmt. Will need to confirm pt PLOF and social support in order to ensure safest DC plan. Acute OT to follow while in house in order to progress independence with ADLs and ADL transfers.     Plan    Recommend Occupational Therapy 4 times per week until therapy goals are met for the following treatments:  Adaptive Equipment, Cognitive Skill Development, Neuro Re-Education / Balance, Self Care/Activities of Daily Living, Therapeutic Activities and Therapeutic Exercises.    Discharge recommendations:  Recommend post-acute placement for additional occupational therapy services prior to discharge home. Pt may improve while admitted to facilitate DC home with home health if able to ensure supportive home environment. Will continue to assess progress.      Subjective    \"No I don't have any breath.\" (pt asked if he has any pain)      06/07/20 1015   Prior Living Situation   Prior Services Unable To Determine At This Time   Housing / Facility 1 Story House   Lives with - Patient's Self Care Capacity Alone and Able to Care For Self   Comments per chart pt lives with spouse, unable to determine details " "d/t expressive language deficit   Prior Level of ADL Function   Comments unable to deterime d/t expressive language deficit   Prior Level of IADL Function   Occupation (Pre-Hospital Vocational) Retired Due To Age   Comments unable to deterime d/t expressive language deficit   Precautions   Precautions Fall Risk;Swallow Precautions ( See Comments)   Comments expressive and receptive aphasia per SLP, impulsive, [place]   Pain 0 - 10 Group   Therapist Pain Assessment Post Activity Pain Same as Prior to Activity;Nurse Notified;0  (\"no I don't feel any breath\")   Cognition    Speech/ Communication Expressive Aphasia;Receptive Aphasia;Slurred;Word Finding Impairment;Hard of Hearing   Level of Consciousness Alert  (unable to assess A&O d/t expressive aphasia)   Safety Awareness Impulsive;Impaired   Comments word salad, poor insight, impaired safety awareness and direction following   Balance Assessment   Weight Shift Sitting Fair   Weight Shift Standing Poor   Comments w/FWW   Bed Mobility    Supine to Sit Supervised   Sit to Supine Supervised   Scooting Supervised   ADL Assessment   Grooming   (declined)   Upper Body Dressing Minimal Assist   Lower Body Dressing Minimal Assist   Toileting   (declined)   Comments pt declined toileting and grooming at this time   Functional Mobility   Sit to Stand Minimal Assist   Bed, Chair, Wheelchair Transfer Minimal Assist   Toilet Transfers Refused   Transfer Method Stand Step   Mobility in room with FWW   Comments poor FWW mgmt despite cues   Short Term Goals   Short Term Goal # 1 pt will demo toilet transfer with SPV   Short Term Goal # 2 pt will complete 5min standing grooming ADLs at sink with SPV   Short Term Goal # 3 pt will demo full body dressing with SPV using AE as needed   Problem List   Problem List Decreased Active Daily Living Skills;Decreased Homemaking Skills;Decreased Functional Mobility;Decreased Activity Tolerance;Safety Awareness Deficits / Cognition;Impaired Cognitive " Function;Impaired Postural Control / Balance

## 2020-06-07 NOTE — PROGRESS NOTES
Received a call from monitor pt experienced 1.5 sec pause, pt declined chest pain, h/a, dizziness. Pt resting comfortably in bed,spouse at bedside, will cont to monitor
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
## 2020-06-07 NOTE — PROGRESS NOTES
Hospital Medicine Daily Progress Note    Date of Service  6/7/2020    Chief Complaint  89 y.o. male admitted 6/6/2020 with AMS    Hospital Course    89-year-old male with a history of atrial fibrillation (not on anticoagulation), coronary artery disease, CVA who presented with altered mental status and speech difficulties and found to have acute stroke.  CT head showed hypodensity in the left temporal lobe consistent with infarction and MRI brain confirmed left acute ischemic parietal and temporal infarct.      Interval Problem Update  -Overnight patient with severe agitation and aggression  -Patient continues to be confused this morning but calm and follows directions  -Afebrile  -Na improved 130-->135    Consultants/Specialty  Neurology    Code Status  FULL    Disposition  TBD    Review of Systems  Review of Systems   Unable to perform ROS: Mental acuity        Physical Exam  Temp:  [36.4 °C (97.6 °F)-36.8 °C (98.3 °F)] 36.4 °C (97.6 °F)  Pulse:  [65-95] 95  Resp:  [16-20] 18  BP: (123-154)/() 142/92  SpO2:  [91 %-98 %] 94 %    Physical Exam  Vitals signs and nursing note reviewed.   Constitutional:       General: He is not in acute distress.     Appearance: He is not toxic-appearing or diaphoretic.   HENT:      Head: Normocephalic and atraumatic.      Nose: Nose normal.      Mouth/Throat:      Mouth: Mucous membranes are moist.   Eyes:      General: No scleral icterus.     Extraocular Movements: Extraocular movements intact.      Conjunctiva/sclera: Conjunctivae normal.   Neck:      Musculoskeletal: Normal range of motion.   Cardiovascular:      Rate and Rhythm: Normal rate. Rhythm irregular.      Heart sounds: No murmur. No friction rub. No gallop.    Pulmonary:      Effort: Pulmonary effort is normal.      Breath sounds: Normal breath sounds.   Abdominal:      General: Abdomen is flat. Bowel sounds are normal. There is no distension.      Palpations: Abdomen is soft.      Tenderness: There is no abdominal  tenderness.   Musculoskeletal:      Right lower leg: No edema.      Left lower leg: No edema.   Skin:     General: Skin is warm and dry.   Neurological:      Mental Status: He is alert. He is disoriented.      Motor: No weakness.         Fluids  No intake or output data in the 24 hours ending 06/07/20 1138    Laboratory  Recent Labs     06/06/20  1005   WBC 11.9*   RBC 4.53*   HEMOGLOBIN 14.2   HEMATOCRIT 43.0   MCV 94.9   MCH 31.3   MCHC 33.0*   RDW 43.6   PLATELETCT 219   MPV 9.3     Recent Labs     06/06/20  1005 06/07/20  0322   SODIUM 130* 135   POTASSIUM 3.8 4.4   CHLORIDE 97 101   CO2 21 21   GLUCOSE 106* 92   BUN 17 15   CREATININE 0.76 0.77   CALCIUM 9.5 9.6     Recent Labs     06/06/20  1005   INR 1.13         Recent Labs     06/07/20  0322   TRIGLYCERIDE 67   HDL 59   LDL 83       Imaging  CT-CTA HEAD WITH & W/O-POST PROCESS   Final Result      No proximal intracranial arterial occlusion or hemodynamically significant stenosis.      CT-CTA NECK WITH & W/O-POST PROCESSING   Final Result      1.  Calcific atherosclerosis is noted in the aorta carotid arteries and branch vessels of the aorta with some interval increase compared to the prior exam.      2.  There is 50% diameter reduction narrowing at the origin of the right internal carotid artery due to presence of calcified atherosclerotic plaque in the proximal ICA which extends superiorly from the right carotid bulb. Narrowing at this location is    increased compared to the prior exam.      3.  No evidence of significant stenosis in the left ICA. Diameter reduction between 0% and 49%.      4.  No vertebral artery stenosis or occlusion is identified.      MR-BRAIN-W/O   Final Result      1.  Small area of acute infarct in the left temporal and parietal lobes.   2.  Chronic infarcts in the right frontal lobes.   3.  Moderate chronic microvascular ischemic disease.   4.  Moderate cerebral volume loss.      DX-CHEST-PORTABLE (1 VIEW)   Final Result       Minimal bibasilar opacities likely atelectasis/scarring.      CT-HEAD W/O   Final Result      1.  Subacute appearing moderate left temporal lobe infarct. Brain MRI may be performed to further evaluate.   2.  Advanced chronic ischemic changes.   3.  No acute intracranial hemorrhage.      EC-ECHOCARDIOGRAM COMPLETE W/O CONT    (Results Pending)        Assessment/Plan  PAF (paroxysmal atrial fibrillation) (HCC)- (present on admission)  Assessment & Plan  Patient is currently in atrial fibrillation and rate controlled  We will continue on telemetry  Initiate rate control medication if needed  Check echo  Patient will probably need long-term anticoagulation when appropriate per neurology recommendations assuming that he is agreeable as he has not been in the past.    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

## 2020-06-08 DIAGNOSIS — I48.0 PAROXYSMAL ATRIAL FIBRILLATION (HCC): ICD-10-CM

## 2020-06-08 DIAGNOSIS — I63.512 ACUTE ISCHEMIC LEFT MCA STROKE (HCC): ICD-10-CM

## 2020-06-08 PROBLEM — I35.0 NONRHEUMATIC AORTIC VALVE STENOSIS: Status: ACTIVE | Noted: 2020-06-08

## 2020-06-08 LAB
ANION GAP SERPL CALC-SCNC: 9 MMOL/L (ref 7–16)
BUN SERPL-MCNC: 22 MG/DL (ref 8–22)
CALCIUM SERPL-MCNC: 9.5 MG/DL (ref 8.5–10.5)
CHLORIDE SERPL-SCNC: 103 MMOL/L (ref 96–112)
CO2 SERPL-SCNC: 22 MMOL/L (ref 20–33)
CREAT SERPL-MCNC: 0.75 MG/DL (ref 0.5–1.4)
ERYTHROCYTE [DISTWIDTH] IN BLOOD BY AUTOMATED COUNT: 44.7 FL (ref 35.9–50)
GLUCOSE SERPL-MCNC: 93 MG/DL (ref 65–99)
HCT VFR BLD AUTO: 44.7 % (ref 42–52)
HGB BLD-MCNC: 14.8 G/DL (ref 14–18)
MCH RBC QN AUTO: 31.8 PG (ref 27–33)
MCHC RBC AUTO-ENTMCNC: 33.1 G/DL (ref 33.7–35.3)
MCV RBC AUTO: 96.1 FL (ref 81.4–97.8)
PLATELET # BLD AUTO: 239 K/UL (ref 164–446)
PMV BLD AUTO: 9.9 FL (ref 9–12.9)
POTASSIUM SERPL-SCNC: 4.8 MMOL/L (ref 3.6–5.5)
RBC # BLD AUTO: 4.65 M/UL (ref 4.7–6.1)
SODIUM SERPL-SCNC: 134 MMOL/L (ref 135–145)
WBC # BLD AUTO: 10.6 K/UL (ref 4.8–10.8)

## 2020-06-08 PROCEDURE — 99254 IP/OBS CNSLTJ NEW/EST MOD 60: CPT | Performed by: PHYSICAL MEDICINE & REHABILITATION

## 2020-06-08 PROCEDURE — 700111 HCHG RX REV CODE 636 W/ 250 OVERRIDE (IP): Performed by: INTERNAL MEDICINE

## 2020-06-08 PROCEDURE — A9270 NON-COVERED ITEM OR SERVICE: HCPCS | Performed by: PSYCHIATRY & NEUROLOGY

## 2020-06-08 PROCEDURE — A9270 NON-COVERED ITEM OR SERVICE: HCPCS | Performed by: HOSPITALIST

## 2020-06-08 PROCEDURE — 700102 HCHG RX REV CODE 250 W/ 637 OVERRIDE(OP): Performed by: INTERNAL MEDICINE

## 2020-06-08 PROCEDURE — 700102 HCHG RX REV CODE 250 W/ 637 OVERRIDE(OP): Performed by: PSYCHIATRY & NEUROLOGY

## 2020-06-08 PROCEDURE — 770020 HCHG ROOM/CARE - TELE (206)

## 2020-06-08 PROCEDURE — 80048 BASIC METABOLIC PNL TOTAL CA: CPT

## 2020-06-08 PROCEDURE — 92526 ORAL FUNCTION THERAPY: CPT

## 2020-06-08 PROCEDURE — A9270 NON-COVERED ITEM OR SERVICE: HCPCS | Performed by: INTERNAL MEDICINE

## 2020-06-08 PROCEDURE — 97530 THERAPEUTIC ACTIVITIES: CPT

## 2020-06-08 PROCEDURE — 96105 ASSESSMENT OF APHASIA: CPT

## 2020-06-08 PROCEDURE — 99233 SBSQ HOSP IP/OBS HIGH 50: CPT | Performed by: INTERNAL MEDICINE

## 2020-06-08 PROCEDURE — 36415 COLL VENOUS BLD VENIPUNCTURE: CPT

## 2020-06-08 PROCEDURE — 97116 GAIT TRAINING THERAPY: CPT

## 2020-06-08 PROCEDURE — 99232 SBSQ HOSP IP/OBS MODERATE 35: CPT | Performed by: NURSE PRACTITIONER

## 2020-06-08 PROCEDURE — 700102 HCHG RX REV CODE 250 W/ 637 OVERRIDE(OP): Performed by: HOSPITALIST

## 2020-06-08 PROCEDURE — 85027 COMPLETE CBC AUTOMATED: CPT

## 2020-06-08 RX ORDER — QUETIAPINE FUMARATE 25 MG/1
25 TABLET, FILM COATED ORAL EVERY EVENING
Status: DISCONTINUED | OUTPATIENT
Start: 2020-06-08 | End: 2020-06-10 | Stop reason: HOSPADM

## 2020-06-08 RX ORDER — QUETIAPINE FUMARATE 25 MG/1
25 TABLET, FILM COATED ORAL EVERY 8 HOURS PRN
Status: DISCONTINUED | OUTPATIENT
Start: 2020-06-08 | End: 2020-06-10 | Stop reason: HOSPADM

## 2020-06-08 RX ADMIN — ENOXAPARIN SODIUM 40 MG: 100 INJECTION SUBCUTANEOUS at 12:56

## 2020-06-08 RX ADMIN — QUETIAPINE FUMARATE 25 MG: 25 TABLET ORAL at 16:38

## 2020-06-08 RX ADMIN — FINASTERIDE 5 MG: 5 TABLET, FILM COATED ORAL at 16:38

## 2020-06-08 RX ADMIN — DIPHENHYDRAMINE HYDROCHLORIDE 25 MG: 50 INJECTION, SOLUTION INTRAMUSCULAR; INTRAVENOUS at 17:43

## 2020-06-08 RX ADMIN — APIXABAN 5 MG: 5 TABLET, FILM COATED ORAL at 16:37

## 2020-06-08 RX ADMIN — DOCUSATE SODIUM 50 MG AND SENNOSIDES 8.6 MG 2 TABLET: 8.6; 5 TABLET, FILM COATED ORAL at 16:38

## 2020-06-08 RX ADMIN — ASPIRIN 325 MG: 325 TABLET, FILM COATED ORAL at 12:55

## 2020-06-08 ASSESSMENT — COGNITIVE AND FUNCTIONAL STATUS - GENERAL
MOVING TO AND FROM BED TO CHAIR: A LITTLE
STANDING UP FROM CHAIR USING ARMS: A LITTLE
SUGGESTED CMS G CODE MODIFIER MOBILITY: CK
MOBILITY SCORE: 16
CLIMB 3 TO 5 STEPS WITH RAILING: A LOT
MOVING FROM LYING ON BACK TO SITTING ON SIDE OF FLAT BED: A LOT
TURNING FROM BACK TO SIDE WHILE IN FLAT BAD: A LITTLE
WALKING IN HOSPITAL ROOM: A LITTLE

## 2020-06-08 ASSESSMENT — GAIT ASSESSMENTS
DISTANCE (FEET): 250
GAIT LEVEL OF ASSIST: MINIMAL ASSIST
ASSISTIVE DEVICE: FRONT WHEEL WALKER

## 2020-06-08 ASSESSMENT — CHA2DS2 SCORE
SEX: MALE
VASCULAR DISEASE: NO
CHA2DS2 VASC SCORE: 4
HYPERTENSION: NO
AGE 75 OR GREATER: YES
AGE 65 TO 74: NO
PRIOR STROKE OR TIA OR THROMBOEMBOLISM: YES
CHF OR LEFT VENTRICULAR DYSFUNCTION: NO

## 2020-06-08 NOTE — PROGRESS NOTES
Chief Complaint   Patient presents with   • ALOC   • Possible Stroke       Problem List Items Addressed This Visit     Hyponatremia     Resolved  CTM           Other Visit Diagnoses     Altered mental status, unspecified altered mental status type        Aphasia        Acute CVA (cerebrovascular accident) (HCC)        Relevant Medications    enoxaparin (LOVENOX) inj 40 mg    Other Relevant Orders    REFERRAL TO PHYSIATRY (PMR)      Neurology Progress Note    Brief History of present illness:  89-year old male with PMhx significant for CAD, paroxysmal Afib (not on AC), stroke (2015, no residual deficits) who presented to [facility] on [facility]6/20 for a chief complaint of confusion upon waking on 6/5; speech was reportedly clear however unintelligible; symptoms persisted however patient initially refused to seek medical evaluation; when symptoms persisted the following day (6/6), wife brought patient to hospital. At time of presentation, patient determined not to be a candidate for IV tPA given presentation time greater than 4.5 hours from time of symptom onset. MRI Brain has revealed acute ischemic infarction involving Left temporal/parietal lobes, no associated hemorrhage.     Interval, 6/8/20:   Patient sitting up in bed; arousable to voice. Somewhat uncooperative, aphasic, follows commands intermittently with assistance of visual cues. Minimal verbal output; ROS is limited. No events overnight.     No changes to HPI as was previously documented.     Past medical history:   As noted above.     Past surgical history:   Past Surgical History:   Procedure Laterality Date   • CYSTOSCOPY  6/13/2016    Procedure: CYSTOSCOPY 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;  Surgeon: Bucky Bustamante M.D.;  Location: SURGERY [facility];  Service:    • CATARACT PHACO WITH IOL  10/28/2009    Performed by PAULA COE at SURGERY SAME DAY [facility]   • CATARACT PHACO WITH IOL  10/14/2009    Performed by  PAULA COE at SURGERY SAME DAY [facility] ORS   • OTHER ORTHOPEDIC SURGERY      PATELLA RIGHT       Family history:   Family History   Problem Relation Age of Onset   • Lung Disease Father         emphysima   • Arthritis Neg Hx        Social history:   Social History     Socioeconomic History   • Marital status:      Spouse name: Not on file   • Number of children: Not on file   • Years of education: Not on file   • Highest education level: Not on file   Occupational History   • Not on file   Social Needs   • Financial resource strain: Not on file   • Food insecurity     Worry: Not on file     Inability: Not on file   • Transportation needs     Medical: Not on file     Non-medical: Not on file   Tobacco Use   • Smoking status: Never Smoker   • Smokeless tobacco: Never Used   Substance and Sexual Activity   • Alcohol use: Yes     Alcohol/week: 2.4 - 3.0 oz     Types: 3 Standard drinks or equivalent, 1 - 2 Cans of beer per week     Frequency: 4 or more times a week     Drinks per session: 1 or 2   • Drug use: No   • Sexual activity: Never     Partners: Female   Lifestyle   • Physical activity     Days per week: Not on file     Minutes per session: Not on file   • Stress: Not on file   Relationships   • Social connections     Talks on phone: Not on file     Gets together: Not on file     Attends Zoroastrianism service: Not on file     Active member of club or organization: Not on file     Attends meetings of clubs or organizations: Not on file     Relationship status: Not on file   • Intimate partner violence     Fear of current or ex partner: Not on file     Emotionally abused: Not on file     Physically abused: Not on file     Forced sexual activity: Not on file   Other Topics Concern   • Not on file   Social History Narrative   • Not on file       Current medications:   Current Facility-Administered Medications   Medication Dose   • QUEtiapine (SEROQUEL) tablet 25 mg  25 mg   • QUEtiapine (SEROQUEL) tablet 25  "mg  25 mg   • enoxaparin (LOVENOX) inj 40 mg  40 mg   • diphenhydrAMINE (BENADRYL) injection 25 mg  25 mg   • aspirin (ASA) tablet 325 mg  325 mg   • finasteride (PROSCAR) tablet 5 mg  5 mg   • senna-docusate (PERICOLACE or SENOKOT S) 8.6-50 MG per tablet 2 Tab  2 Tab    And   • polyethylene glycol/lytes (MIRALAX) PACKET 1 Packet  1 Packet    And   • magnesium hydroxide (MILK OF MAGNESIA) suspension 30 mL  30 mL    And   • bisacodyl (DULCOLAX) suppository 10 mg  10 mg   • acetaminophen (TYLENOL) tablet 650 mg  650 mg   • ondansetron (ZOFRAN) syringe/vial injection 4 mg  4 mg   • ondansetron (ZOFRAN ODT) dispertab 4 mg  4 mg       Medication Allergy:  Allergies   Allergen Reactions   • Bloodless Unspecified     Pt states he is willing to accept blood/blood products as \"a last resort\"   • Coufarin [Warfarin] Unspecified     Pts wife states \"He just does not like that drug\".   • Crestor [Rosuvastatin Calcium] Rash and Itching     Rash/itching all over body   • Flomax [Tamsulosin] Unspecified     Pts wife states \"His BP goes really low and he fell down\"   • Heparin Hives, Rash and Itching     Pts wife states \"Rash all over body\".   • Lipitor [Atorvastatin] Rash     Pts wife states \"Rash all over body\".   • Macrobid [Kdc:Red Dye+Yellow Dye+Nitrofurantoin+Brilliant Blue Fcf] Nausea     Pt's wife states \"Makes pt weak and nausea\".   • Statins [Hmg-Coa-R Inhibitors] Rash and Itching     Rash/itching all over body       Review of systems:   Limited as patient is aphasic.       Physical examination:   Vitals:    06/07/20 1200 06/07/20 1600 06/07/20 2000 06/08/20 0500   BP: 134/80 115/65 128/98 144/69   Pulse: 81 82 77 90   Resp: 16 16 18 16   Temp: 36.7 °C (98 °F) 36.9 °C (98.4 °F) 36.9 °C (98.5 °F) 36.4 °C (97.5 °F)   TempSrc: Temporal Temporal Temporal Temporal   SpO2: 92% 92% 92% 90%   Weight:         General: Patient in no acute distress, somewhat cooperative.  HEENT: Normocephalic, no signs of acute trauma.   Neck: " supple, no meningeal signs or carotid bruits. There is normal range of motion. No tenderness on exam.   Chest: clear to auscultation. No cough.   CV: RRR, no murmurs.   Skin: no signs of acute rashes or trauma.   Musculoskeletal: joints exhibit full range of motion, without any pain to palpation. There are no signs of joint or muscle swelling. There is no tenderness to deep palpation of muscles.   Psychiatric: No hallucinatory behavior.       NEUROLOGICAL EXAM:   Mental status, orientation: Awake, oriented to self.   Speech and language: speech is aphasic (expressive, receptive); mild dysarthria. Requires visual cues to follow commands; is uncooperative at times.   Cranial nerve exam: Pupils are 3-4 mm bilaterally and equally reactive to light. Visual fields are intact by confrontation. There is no nystagmus on primary or secondary gaze. Tracks spontaneously, not to command; Intact full EOM in all directions of gaze. Face appears symmetric. Sensation in the face is intact to light touch. Tongue is midline and without any signs of tongue biting or fasciculations.  Motor exam: Strength is 5/5 in all extremities. Tone is normal. No abnormal movements were seen on exam.   Sensory exam reveals normal sense of light touch and pinprick in all extremities.   Deep tendon reflexes:  2+ throughout. Plantar responses are flexor. There is no clonus.   Coordination: patient does not understand/participate.  Gait: Not assessed at this time as patient is a fall risk.       NIH Stroke Scale    1a Level of Consciousness   1b Orientation Questions 2  1c Response to Commands 1  2 Gaze   3 Visual Fields   4 Facial Movement   5 Motor Function (arm)   a Left   b Right   6 Motor Function (leg)   a Left   b Right   7 Limb Ataxia   8 Sensory   9 Language 2  10 Articulation 1  11 Extinction/Inattention     Score: 6        ANCILLARY DATA REVIEWED:     Lab Data Review:  Recent Results (from the past 24 hour(s))   CBC WITHOUT DIFFERENTIAL     Collection Time: 06/08/20  8:48 AM   Result Value Ref Range    WBC 10.6 4.8 - 10.8 K/uL    RBC 4.65 (L) 4.70 - 6.10 M/uL    Hemoglobin 14.8 14.0 - 18.0 g/dL    Hematocrit 44.7 42.0 - 52.0 %    MCV 96.1 81.4 - 97.8 fL    MCH 31.8 27.0 - 33.0 pg    MCHC 33.1 (L) 33.7 - 35.3 g/dL    RDW 44.7 35.9 - 50.0 fL    Platelet Count 239 164 - 446 K/uL    MPV 9.9 9.0 - 12.9 fL   Basic Metabolic Panel    Collection Time: 06/08/20  8:48 AM   Result Value Ref Range    Sodium 134 (L) 135 - 145 mmol/L    Potassium 4.8 3.6 - 5.5 mmol/L    Chloride 103 96 - 112 mmol/L    Co2 22 20 - 33 mmol/L    Glucose 93 65 - 99 mg/dL    Bun 22 8 - 22 mg/dL    Creatinine 0.75 0.50 - 1.40 mg/dL    Calcium 9.5 8.5 - 10.5 mg/dL    Anion Gap 9.0 7.0 - 16.0   ESTIMATED GFR    Collection Time: 06/08/20  8:48 AM   Result Value Ref Range    GFR If African American >60 >60 mL/min/1.73 m 2    GFR If Non African American >60 >60 mL/min/1.73 m 2       Labs reviewed by me.       Imaging reviewed by me:     EC-ECHOCARDIOGRAM COMPLETE W/O CONT   Final Result      CT-CTA HEAD WITH & W/O-POST PROCESS   Final Result      No proximal intracranial arterial occlusion or hemodynamically significant stenosis.      CT-CTA NECK WITH & W/O-POST PROCESSING   Final Result      1.  Calcific atherosclerosis is noted in the aorta carotid arteries and branch vessels of the aorta with some interval increase compared to the prior exam.      2.  There is 50% diameter reduction narrowing at the origin of the right internal carotid artery due to presence of calcified atherosclerotic plaque in the proximal ICA which extends superiorly from the right carotid bulb. Narrowing at this location is    increased compared to the prior exam.      3.  No evidence of significant stenosis in the left ICA. Diameter reduction between 0% and 49%.      4.  No vertebral artery stenosis or occlusion is identified.      MR-BRAIN-W/O   Final Result      1.  Small area of acute infarct in the left temporal  and parietal lobes.   2.  Chronic infarcts in the right frontal lobes.   3.  Moderate chronic microvascular ischemic disease.   4.  Moderate cerebral volume loss.      DX-CHEST-PORTABLE (1 VIEW)   Final Result      Minimal bibasilar opacities likely atelectasis/scarring.      CT-HEAD W/O   Final Result      1.  Subacute appearing moderate left temporal lobe infarct. Brain MRI may be performed to further evaluate.   2.  Advanced chronic ischemic changes.   3.  No acute intracranial hemorrhage.            Presumed mechanism by TOAST:  __Large Artery Atherosclerosis  __Small Vessel (Lacunar)  _X_Cardioembolic  __Other (Sickle Cell, Vasculitis, Hypercoagulable)  __Unknown      ASSESSMENT AND PLAN:  89-year old male with PMhx significant for CAD, paroxysmal Afib (not on AC), stroke (2015, no residual deficits) who presented to [facility] on 6/6/20 for a chief complaint of confusion and difficulty speaking; not a candidate for IV tPA given presentation time greater than 4.5 hours from time of symptom onset. MRI Brain has revealed acute ischemic infarction involving Left temporal/parietal lobes, no associated hemorrhage. Have relatively high suspicion for cardioembolic etiology given hx of Afib not on AC-- note, it is clearly documented that patient/patient's family has actively chosen to forgo anticoagulation use given concern for/risk of hemorrhage with AC use. May also consider atheroemboli event/etiology given Right ICA >50% stenosis, though feel this less likely. NIHSS is currently 6.     Recommendations/Plan:     -q4h and PRN neuro assessment. VS per nursing/unit protocol. BP goal < 140/90. Antihypertensives per primary team.   -Telemetry; currently Afib. Note TTE with EF 70%; moderate aortic stenosis; no other gross structural abnormalities. As was noted above, it is well-documented throughout patient's medical record by multiple providers that patient/family have been well educated regarding the potential  risk/benefit of AC use, as well as high risk for stroke (elevated CHADSVASC2 score) without AC use in Afib. They continue to assert their desire to forgo AC given hemorrhage risk. Recommend re-counseling family regarding importance to AC in stroke prevention in this situation; if agreeable, may start AC with stable repeat CT head.    -Continue ASA 81 mg PO q day; if AC is pursued, may d/c ASA.     -Note Atorvastatin allergy; if true allergy, recommend addition of Fish Oil (Omega 3s) to medication regimen. Note LDL is 83.   -Recommend aggressive BG management per primary team. Avoid IVF with Dextrose. BG goal 140-180. hemoglobin A1c is 5.5.  -PT/OT/SLP eval and treat. Physiatry consult.  -All other medical management per primary team.   -DVT PPX: SCDs.      The plan of care above has been discussed with Dr. Gupta. Other than the above, no further recommendations from a neurological perspective; will place outpatient stroke bridge clinic referral for follow-up. Please call with questions.     MARTHA KamRCORY.  [facility]

## 2020-06-08 NOTE — PROGRESS NOTES
Hospital Medicine Daily Progress Note    Date of Service  6/8/2020    Chief Complaint  89 y.o. male admitted 6/6/2020 with AMS    Hospital Course    89-year-old male with a history of atrial fibrillation (not on anticoagulation), coronary artery disease, CVA who presented with altered mental status and speech difficulties and found to have acute stroke.  CT head showed hypodensity in the left temporal lobe consistent with infarction and MRI brain confirmed left acute ischemic parietal and temporal infarct.  Wife and patient refused to start AC.        Interval Problem Update  - continues to have agitation/aggression at night/early morning  - wife and patient now agreeable to AC  - wife also refusing rehab, wants patient home  - patient worked with ST/PT/OT today and does show improvement during the day  - developed rash overnight on crestor, required benadryl  - on RA  - Afebrile  - echo showed EF 70%, moderate/severe AS, RVSP 45 mmHg    Consultants/Specialty  Neurology    Code Status  FULL    Disposition  TBD    Review of Systems  Review of Systems   Unable to perform ROS: Mental acuity        Physical Exam  Temp:  [36.4 °C (97.5 °F)-36.9 °C (98.5 °F)] 36.4 °C (97.5 °F)  Pulse:  [77-90] 90  Resp:  [16-18] 16  BP: (115-144)/(65-98) 144/69  SpO2:  [90 %-92 %] 90 %    Physical Exam  Vitals signs and nursing note reviewed.   Constitutional:       General: He is not in acute distress.     Appearance: He is not toxic-appearing or diaphoretic.   HENT:      Head: Normocephalic and atraumatic.      Nose: Nose normal.      Mouth/Throat:      Mouth: Mucous membranes are moist.   Eyes:      General: No scleral icterus.     Extraocular Movements: Extraocular movements intact.      Conjunctiva/sclera: Conjunctivae normal.   Neck:      Musculoskeletal: Normal range of motion.   Cardiovascular:      Rate and Rhythm: Normal rate. Rhythm irregular.      Heart sounds: Murmur present. No friction rub. No gallop.    Pulmonary:       Effort: Pulmonary effort is normal.      Breath sounds: Normal breath sounds.   Abdominal:      General: Abdomen is flat. Bowel sounds are normal. There is no distension.      Palpations: Abdomen is soft.      Tenderness: There is no abdominal tenderness.   Musculoskeletal:      Right lower leg: No edema.      Left lower leg: No edema.   Skin:     General: Skin is warm and dry.   Neurological:      Mental Status: He is alert. He is disoriented.      Motor: No weakness.      Gait: Gait normal.      Comments: Continues to have intermittent aphasia, however this afternoon heard him in halls speaking to PT quite clearly         Fluids    Intake/Output Summary (Last 24 hours) at 6/8/2020 1458  Last data filed at 6/8/2020 1400  Gross per 24 hour   Intake 440 ml   Output --   Net 440 ml       Laboratory  Recent Labs     06/06/20  1005 06/08/20  0848   WBC 11.9* 10.6   RBC 4.53* 4.65*   HEMOGLOBIN 14.2 14.8   HEMATOCRIT 43.0 44.7   MCV 94.9 96.1   MCH 31.3 31.8   MCHC 33.0* 33.1*   RDW 43.6 44.7   PLATELETCT 219 239   MPV 9.3 9.9     Recent Labs     06/06/20  1005 06/07/20  0322 06/08/20  0848   SODIUM 130* 135 134*   POTASSIUM 3.8 4.4 4.8   CHLORIDE 97 101 103   CO2 21 21 22   GLUCOSE 106* 92 93   BUN 17 15 22   CREATININE 0.76 0.77 0.75   CALCIUM 9.5 9.6 9.5     Recent Labs     06/06/20  1005   INR 1.13         Recent Labs     06/07/20  0322   TRIGLYCERIDE 67   HDL 59   LDL 83       Imaging  EC-ECHOCARDIOGRAM COMPLETE W/O CONT   Final Result      CT-CTA HEAD WITH & W/O-POST PROCESS   Final Result      No proximal intracranial arterial occlusion or hemodynamically significant stenosis.      CT-CTA NECK WITH & W/O-POST PROCESSING   Final Result      1.  Calcific atherosclerosis is noted in the aorta carotid arteries and branch vessels of the aorta with some interval increase compared to the prior exam.      2.  There is 50% diameter reduction narrowing at the origin of the right internal carotid artery due to presence of  calcified atherosclerotic plaque in the proximal ICA which extends superiorly from the right carotid bulb. Narrowing at this location is    increased compared to the prior exam.      3.  No evidence of significant stenosis in the left ICA. Diameter reduction between 0% and 49%.      4.  No vertebral artery stenosis or occlusion is identified.      MR-BRAIN-W/O   Final Result      1.  Small area of acute infarct in the left temporal and parietal lobes.   2.  Chronic infarcts in the right frontal lobes.   3.  Moderate chronic microvascular ischemic disease.   4.  Moderate cerebral volume loss.      DX-CHEST-PORTABLE (1 VIEW)   Final Result      Minimal bibasilar opacities likely atelectasis/scarring.      CT-HEAD W/O   Final Result      1.  Subacute appearing moderate left temporal lobe infarct. Brain MRI may be performed to further evaluate.   2.  Advanced chronic ischemic changes.   3.  No acute intracranial hemorrhage.           Assessment/Plan  PAF (paroxysmal atrial fibrillation) (HCC)- (present on admission)  Assessment & Plan  Patient is currently in atrial fibrillation and rate controlled  We will continue on telemetry  Initiate rate control medication if needed  Starting Eliquis 5mg BID    Hyponatremia  Assessment & Plan  Resolved  CTM    Nonrheumatic aortic valve stenosis  Assessment & Plan  echo showed EF 70%, moderate/severe AS, RVSP 45 mmHg  - echo recommended dobutamine echo however given acute stroke will hold off this admission, unlikely to be surgical candidate    Acute ischemic left MCA stroke (HCC)  Assessment & Plan  Possibly related to the patient's history of paroxysmal atrial fibrillation  Presents with dense expressive and receptive aphasia as  Neurology consulted  Started aspirin 325mg -->81mg  Started Eliquis given afib  Allergy to statins, would benefit from referral to lipid clinic at discharge  Physiatry, PT and OT consultations  Permissive hypertension for the next 24 hours  Continue  telemetry monitoring    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

## 2020-06-08 NOTE — DISCHARGE PLANNING
Calls placed to Rekha, Spouse to look into PLOF as well as D/C resources/support.  Home # is disconnected.  VM left on Mobile #. Sitter has been D/C'd as of 0700 this am.  Lap belt in place-this may be a barrier to [facility]. Physiatry to consult.

## 2020-06-08 NOTE — CARE PLAN
Problem: Safety  Goal: Will remain free from injury  Outcome: PROGRESSING AS EXPECTED  Note: No falls during shift. Patient moved to room near nurse's station. Bed alarm on and in lowest position. Verified use of yellow wrist band and socks. Tele-sitter and lap belt in place.      Problem: Venous Thromboembolism (VTW)/Deep Vein Thrombosis (DVT) Prevention:  Goal: Patient will participate in Venous Thrombosis (VTE)/Deep Vein Thrombosis (DVT)Prevention Measures  Outcome: PROGRESSING AS EXPECTED  Flowsheets (Taken 6/7/2020 1706)  Pharmacologic Prophylaxis Used: LMWH: Enoxaparin(Lovenox)  Note: Patient started on VTE prophylaxis. No s/sx of VTE noted during assessment of extremities.

## 2020-06-08 NOTE — DOCUMENTATION QUERY
[facility]                                                                       Query Response Note      PATIENT:               JEREL GROSS  ACCT #:                  [id number]  MRN:                     [id number]  :                      10/23/1924  ADMIT DATE:       2020 10:00 AM  DISCH DATE:          RESPONDING  PROVIDER #:        [id number]           QUERY TEXT:    Altered mental status is documented in the Medical Record.  Can a more specific diagnosis be provided?    NOTE:  If the appropriate response is not listed below, please respond with a new note.    The patient's Clinical Indicators include:  ED Note: Extremely confused, altered mental status, aphasia, acute CVA, hyponatremia  H&P: PAF; hyponatremia; left MCA stroke; receptive aphasia   MRI-Brain: infarct in the left temporal and parietal lobes.Chronic infarcts in the right frontal lobes.   Neurology Consult: Altered mental status; hyponatremia; afib; acute stroke  Treatment: Neurology consult; permissive hypertension; aspirin; crestor; lab/imaging  Risk Factors: Advanced age; stroke; hyponatremia  Options provided:   -- Acute metabolic encephalopathy   -- Acute encephalopathy due to other medical condition, (please specify other medical condition)   -- Delirium due to known physiological condition, (please specify the known physiological condition)   -- Delirium due to general medical condition   -- Delirium due to multiple etiologies   -- Delirium of unknown etiology   -- Unable to determine      Query created by: Carine Nichols on 2020 10:15 AM    RESPONSE TEXT:    Delirium due to multiple etiologies          Electronically signed by:  JORDI CR MD 2020 10:21 AM

## 2020-06-08 NOTE — CONSULTS
"    Physical Medicine and Rehabilitation Consultation         Initial Consult      Initial Consultation Date: 6/8/2020  Consulting provider: Dr. Joiner  Reason for consultation: assess for acute inpatient rehab appropriateness  LOS: 2 Day(s)      Chief complaint: stroke      HPI:   The patient is a 89 y.o. male with a past medical history of Afib, CAD, CVA;  who presented on 6/6/2020 10:00 AM with AMS and speech difficulties, found to have acute stroke in left parietal/temporal lobes. Severe agitation and aggression noted overnight of 6/6-6/7. Na improved.     The patient currently is not able to verbalize much, especially limited by receptive aphasia, but able to intermittently follow along with gestures. Bedside RN helpful in providing additional info, wife will likely be visiting around 3pm. Patient able to move all 4 limbs, no chow, having BM.         ROS:  Limited by aphasia         Social Hx:  Unable to obtain pre-morbid status at this time, due to aphasia and inability to contact family        Current level of function: The patient was evaluated by:  OT, 6/7: Min A for mobility, ADLs  SLP, 6/7: dysphagia III diet, thin liquids  PT, 6/7: Min A x125 ft with FWW and also without assistive device; \"shuffled gait\" and \"large stpes and throwing FWW ahead of him\"           PMH:  Past Medical History:   Diagnosis Date   • Angina 2005    episode of atrial fibrillation   • Arrhythmia     History of afib, no longer on medication   • CAD (coronary artery disease)     paroximal atrial tachycardia   • CATARACT     asim iol   • Dental disorder     lower dentures   • Macular degeneration of both eyes    • Pneumonia 2008    Not hospitalized for it   • Renal disorder 8/4/2012    pyelonephritis   • Stroke (HCC) 2015    pt reports no residual weakness   • Unspecified urinary incontinence     dribbles         PSH:  Past Surgical History:   Procedure Laterality Date   • CYSTOSCOPY  6/13/2016    Procedure: CYSTOSCOPY " "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;  Surgeon: Bucky Bustamante M.D.;  Location: SURGERY [facility];  Service:    • CATARACT PHACO WITH IOL  10/28/2009    Performed by PAULA COE at SURGERY SAME DAY [facility] ORS   • CATARACT PHACO WITH IOL  10/14/2009    Performed by PAULA OCE at SURGERY SAME DAY [facility] ORS   • OTHER ORTHOPEDIC SURGERY      PATELLA RIGHT         FHX: Reviewed.   Family History   Problem Relation Age of Onset   • Lung Disease Father         emphysima   • Arthritis Neg Hx          Medications:  Current Facility-Administered Medications   Medication Dose   • enoxaparin (LOVENOX) inj 40 mg  40 mg   • haloperidol lactate (HALDOL) injection 1 mg  1 mg   • diphenhydrAMINE (BENADRYL) injection 25 mg  25 mg   • aspirin (ASA) tablet 325 mg  325 mg   • finasteride (PROSCAR) tablet 5 mg  5 mg   • senna-docusate (PERICOLACE or SENOKOT S) 8.6-50 MG per tablet 2 Tab  2 Tab    And   • polyethylene glycol/lytes (MIRALAX) PACKET 1 Packet  1 Packet    And   • magnesium hydroxide (MILK OF MAGNESIA) suspension 30 mL  30 mL    And   • bisacodyl (DULCOLAX) suppository 10 mg  10 mg   • Pharmacy consult request - Allow for permissive hypertension: SBP up to 220 mmHg/DBP up to 120 mmHg x 48 hours     • acetaminophen (TYLENOL) tablet 650 mg  650 mg   • ondansetron (ZOFRAN) syringe/vial injection 4 mg  4 mg   • ondansetron (ZOFRAN ODT) dispertab 4 mg  4 mg       Allergies:  Allergies   Allergen Reactions   • Bloodless Unspecified     Pt states he is willing to accept blood/blood products as \"a last resort\"   • Coufarin [Warfarin] Unspecified     Pts wife states \"He just does not like that drug\".   • Crestor [Rosuvastatin Calcium] Rash and Itching     Rash/itching all over body   • Flomax [Tamsulosin] Unspecified     Pts wife states \"His BP goes really low and he fell down\"   • Heparin Hives, Rash and Itching     Pts wife states \"Rash all over body\".   • Lipitor [Atorvastatin] Rash     " "Pts wife states \"Rash all over body\".   • Macrobid [Kdc:Red Dye+Yellow Dye+Nitrofurantoin+Brilliant Blue Fcf] Nausea     Pt's wife states \"Makes pt weak and nausea\".   • Statins [Hmg-Coa-R Inhibitors] Rash and Itching     Rash/itching all over body         Physical Exam:  Vitals: /69   Pulse 90   Temp 36.4 °C (97.5 °F) (Temporal)   Resp 16   Wt 72 kg (158 lb 11.7 oz)   SpO2 90%     Gen: pleasant  Head: no visible head lesions or abrasion  Eyes/ Nose/ Mouth: moist mucous membranes  Cardio: Well perfused extremities, no peripheral edema  Pulm: on room air, with normal respiratory effort  Abd: Soft NTND  Skin: No visible rashes  : no chow  Ext: No atrophy of muscles, no joint contractures of extremities   Psych: calm affect, unable to follow commands or answer questions due to receptive aphasia and some expressive aphasia    Neuro:   -Hearing and visual acuity functional and grossly intact    Motor: *difficult to assess due to receptive aphasia, but will mimic some simple behaviors  -Bilateral upper extremities: 4/5 with arm abduction, elbow flexion, elbow extension, finger flexion  -Bilateral lower extremities: 4/5 with hip flexion, knee extension, ankle dorsiflexion,  plantarflexion    Reflexes:  -Negative clonus bilateral ankles          Labs: Reviewed and significant for 6/8/20: Hgb 14.8, Na 135, K 4.4, Cr 0.77, WBC 10   Recent Labs     06/06/20  1005 06/08/20  0848   RBC 4.53* 4.65*   HEMOGLOBIN 14.2 14.8   HEMATOCRIT 43.0 44.7   PLATELETCT 219 239   PROTHROMBTM 14.8*  --    INR 1.13  --      Recent Labs     06/06/20  1005 06/07/20  0322   SODIUM 130* 135   POTASSIUM 3.8 4.4   CHLORIDE 97 101   CO2 21 21   GLUCOSE 106* 92   BUN 17 15   CREATININE 0.76 0.77   CALCIUM 9.5 9.6     Recent Results (from the past 24 hour(s))   EC-ECHOCARDIOGRAM COMPLETE W/O CONT    Collection Time: 06/07/20  1:18 PM   Result Value Ref Range    Eject.Frac. MOD BP 71.63     Eject.Frac. MOD 4C 67.76     Eject.Frac. MOD 2C " 71.69     Left Ventrical Ejection Fraction 70    CBC WITHOUT DIFFERENTIAL    Collection Time: 06/08/20  8:48 AM   Result Value Ref Range    WBC 10.6 4.8 - 10.8 K/uL    RBC 4.65 (L) 4.70 - 6.10 M/uL    Hemoglobin 14.8 14.0 - 18.0 g/dL    Hematocrit 44.7 42.0 - 52.0 %    MCV 96.1 81.4 - 97.8 fL    MCH 31.8 27.0 - 33.0 pg    MCHC 33.1 (L) 33.7 - 35.3 g/dL    RDW 44.7 35.9 - 50.0 fL    Platelet Count 239 164 - 446 K/uL    MPV 9.9 9.0 - 12.9 fL           Imaging:   Ct-cta Head With & W/o-post Process  Result Date: 6/6/2020 6/6/2020 4:39 PM HISTORY/REASON FOR EXAM:  Stroke, follow up Abnormal head CT TECHNIQUE/EXAM DESCRIPTION: CT angiogram of the [place] without and with contrast.  Initial precontrast images were obtained of the head from the skull base through the vertex.  Postcontrast images were obtained of the [place] following the power injection of nonionic contrast at 5.0 mL/sec. Thin-section helical images were obtained with overlapping reconstruction interval. Coronal and sagittal multiplanar volume reformats were generated.  3D angiographic images were reviewed on PACS.  Maximum intensity projection (MIP) images were generated and reviewed. 80 mL of Omnipaque 350 nonionic contrast was injected intravenously. Low dose optimization technique was utilized for this CT exam including automated exposure control and adjustment of the mA and/or kV according to patient size. COMPARISON:  None. FINDINGS: Study is limited by motion artifact. Petrocavernous and supraclinoid ICA segments are patent.  MCA and RONI branches are patent. The intradural vertebral, basilar and posterior cerebral arteries are patent. No occlusion or hemodynamically significant stenosis.  No aneurysm or high flow vascular malformation. 3D angiographic/MIP images of the vasculature confirm the vascular findings as described above.     No proximal intracranial arterial occlusion or hemodynamically significant stenosis.        Ct-cta  Neck With & W/o-post Processing  Result Date: 6/6/2020 6/6/2020 4:39 PM HISTORY/REASON FOR EXAM:  Stroke, follow up TECHNIQUE/EXAM DESCRIPTION: CT angiogram of the neck with contrast. Postcontrast images were obtained of the neck from the great vessels through the skull base following the power injection of nonionic contrast at 5.0 mL/sec. Thin-section helical images were obtained with overlapping reconstruction interval. Coronal and oblique multiplanar volume reformats were generated. Cervical internal carotid artery percent stenosis is calculated using the standard method according to the NASCET criteria wherein a segment of uniform caliber mid or distal cervical internal carotid is used as the reference denominator. 3D angiographic images were reviewed on PACS.  Maximum intensity projection (MIP) images were generated and reviewed 80 mL of Omnipaque 350 nonionic contrast was injected intravenously. Low dose optimization technique was utilized for this CT exam including automated exposure control and adjustment of the mA and/or kV according to patient size. COMPARISON:  04/08/2015 FINDINGS: Calcified atherosclerotic plaque is noted in the aortic arch and its branch vessels. There is calcified plaque in the carotid bulb and ICA bilaterally. There is 50% diameter reduction narrowing at the origin of the right internal carotid artery. No other evidence of stenosis or occlusion involving the right common carotid artery carotid bulb or internal carotid artery is identified. External carotid artery is patent. The left common carotid artery, cervical carotid bifurcation, and cervical internal carotid artery are within normal limits. There is no evidence of significant stenosis, thrombus, or other filling defect or ulceration. There is no evidence of dissection  or aneurysm. The cervical vertebral arteries are normal bilaterally. The neck soft tissues and lung apices in the field of view are unremarkable. 3D  angiographic/MIP images of the vasculature confirm the vascular findings as described above.     1.  Calcific atherosclerosis is noted in the aorta carotid arteries and branch vessels of the aorta with some interval increase compared to the prior exam. 2.  There is 50% diameter reduction narrowing at the origin of the right internal carotid artery due to presence of calcified atherosclerotic plaque in the proximal ICA which extends superiorly from the right carotid bulb. Narrowing at this location is increased compared to the prior exam. 3.  No evidence of significant stenosis in the left ICA. Diameter reduction between 0% and 49%. 4.  No vertebral artery stenosis or occlusion is identified.        Ct-head W/o  Result Date: 6/6/2020 6/6/2020 10:17 AM HISTORY/REASON FOR EXAM:  Altered mental status. TECHNIQUE/EXAM DESCRIPTION AND NUMBER OF VIEWS: CT of the head without contrast. The study was performed on a helical multidetector CT scanner. Contiguous 2.5 mm axial sections were obtained from the skull base through the vertex. Up to date radiation dose reduction adjustments have been utilized to meet ALARA standards for radiation dose reduction. COMPARISON:  4/8/2015 FINDINGS: Mild/moderate generalized volume loss. Confluent hypodensities in the cerebral white matter most likely represent advanced chronic microvascular ischemic changes. Old small right frontal lobe infarct. Old tiny right parietal cortical infarct. Hypodensity in the left temporal lobe suggesting moderate subacute appearing infarct. No acute intracranial hemorrhage. No mass effect, midline shift or hydrocephalus. Paranasal sinuses and mastoids are clear. No depressed calvarial fracture.     1.  Subacute appearing moderate left temporal lobe infarct. Brain MRI may be performed to further evaluate. 2.  Advanced chronic ischemic changes. 3.  No acute intracranial hemorrhage.          Mr-brain-w/o  Result Date: 6/6/2020 6/6/2020 1:40 PM HISTORY/REASON  FOR EXAM:  Stroke, follow up. TECHNIQUE/EXAM DESCRIPTION: MRI of the brain without contrast. T1 sagittal, T2 fast spin-echo axial, T1 coronal, FLAIR coronal, diffusion-weighted and apparent diffusion coefficient (ADC map) axial images were obtained of the whole brain. The study was performed on a Mayday PAC Signa 1.5 Vandana MRI scanner. COMPARISON:  4/9/15 FINDINGS: A small area of acute infarct is seen in the left parietal and posterior temporal lobe. There is no hemorrhage transformation. Chronic infarcts are noted in the right frontal lobes. Diffuse nonspecific T2 subcortical and periventricular white matter. Moderate cerebral volume loss is seen. There is no intra-axial space-occupying lesion. A partially empty sella is seen. The ventricles, cortical sulci and the basal cisterns are prominent consistent with moderate cerebral volume loss. There is no extra-axial fluid collection, hemorrhage or mass. The visualized flow voids of the cerebral vasculature are unremarkable.  There is no large lesion identified in the expected course of the intracranial portions of the cranial nerves. The skull bones are unremarkable. The paranasal sinuses are clear. The extracranial soft tissue including orbits appear grossly normal.     1.  Small area of acute infarct in the left temporal and parietal lobes. 2.  Chronic infarcts in the right frontal lobes. 3.  Moderate chronic microvascular ischemic disease. 4.  Moderate cerebral volume loss.        Ec-echocardiogram Complete W/o Cont  Result Date: 6/7/2020  Transthoracic Echo Report Echocardiography Laboratory CONCLUSIONS Prior echocardiogram 4/9/2015 .Left ventricular ejection fraction is visually estimated to be 70%. Moderate aortic stenosis.  Calculated RUBEN is 0.95 cm2.  Cannot rule out severe AS.  Consider dobutamine stress echo. Estimated right ventricular systolic pressure  is 45 mmHg.             ASSESSMENT:  Patient is a 89 y.o. male admitted with left parietal & temporal stroke,  "causing both receptive and expressive aphasia.        # Rehabilitation: Impaired ADLs and mobility  -Vitals stable  -Insurance: [facility] and Medicare  -TCC reaching out to family to verify discharge support  -Rehab Impairment Code: 0001.2 - Stroke: Right Body Involvement (Left Brain)  -Sitter DC'ed as of 6/8/20, but does have lap belt, which may not be possible to continue at [facility]. Will check if a Posey bed is an alternative option.  -Need discharge support verification, TCC reaching out to family. Also pending bed availability.  -Pending additional info as listed above.       #Neuro: left temporal & parietal lobe acute CVA; rosuvastatin DC'ed due to allergies  -SBP < 140, normotension in 1-2 weeks  -normoglycemia  -asa  -Echo 6/7/20: EF 70%, moderate (?severe) aortic stenosis, \"consider dobutamine stress echo\", no mention of PFO  -Agree with seroquel and DC of haldol   -If needed, consider second generation antipsychotics for acute delirium management with ziprasidone 10 mg IM Q4H PRN      #CV: paroxysmal afib, rate controlled  -asa  -see \"Neuro\" above      #Itching: IV benadryl 6/7, recommend avoiding benadryl given age 89, can slow neuro-recovery, and potentially cause worse delirium      #Hyponatremia: improved to Na 135      #Bowel: 2x BM on 6/7, not needing bowel meds      #Bladder: self-cath at baseline, finasteride       #DVT: lovenox      Discussed with pt, summarized hospitalization and care, options for next step of care  Labs reviewed   Imaging personally reviewed: MRI brain 6/6/20, with increased signal intensity over left parietal/temporal region on DWI, as well as enlarged lateral ventricles  Discussed with rehab team about recommendations       Thank you for allowing us to participate in the care of this patient.           Korey Webster MD  Physical Medicine and Rehabilitation   6/8/2020        "

## 2020-06-08 NOTE — CARE PLAN
Problem: Skin Integrity  Goal: Skin Integrity is maintained or improved  Outcome: PROGRESSING AS EXPECTED   Patient turning self frequently in bed, nursing ensuring patient is turned every 2 hours to prevent skin breakdown  Problem: Safety - Medical Restraint  Goal: Remains free of injury from restraints (Restraint for Interference with Medical Device)  Description: INTERVENTIONS:  1. Determine that other, less restrictive measures have been tried or would not be effective before applying the restraint  2. Evaluate the patient's condition at the time of restraint application  3. Inform patient/family regarding the reason for restraint  4. Q2H: Monitor safety, psychosocial status, comfort, nutrition and hydration  Outcome: PROGRESSING SLOWER THAN EXPECTED   Lap belt restraint in place for safety, patient does show ability to remove restraint himself, restraint order in place.

## 2020-06-08 NOTE — PROGRESS NOTES
Assumed patient care at 1900 and received bedside report.    Patient sleepy, arousable with stimulation, not participating in NIH assessment, refusing to follow commands. Unable to assess orientation. Lap belt in place, tele sitter in place, report given to tele sitter. Bed alarm on.   POC discussed and education provided on administered medications. All questions and concerns addressed. Fall precautions, hourly rounding and Q4 hour neuro checks in place.     1940: Patient restless, scratching at arms. Lotion applied to bilateral arms.     2100: Patient continuing to scratch entire body, skin red from rash/itching. On call hospitalist paged, orders received for IV benadryl for itching and to discontinue crestor and add statins to allergy list. Crestor and statins added to allergy list, patient medicated per MAR. Will continue to monitor.     2215: Patient resting comfortably in bed, itching resolved at this time. Will continue to monitor. Wife calling asking for update, wife updated on plan of care.

## 2020-06-08 NOTE — THERAPY
"Speech Language Pathology   Initial Assessment     Patient Name: Alistair Tavares  AGE:  89 y.o., SEX:  male  Medical Record #: [id number]  Today's Date: 6/8/2020     Precautions: Fall Risk, Swallow Precautions ( See Comments)  Comments: expressive and receptive aphasia, [place], impulsive per PT/OT notes     Assessment    Pt is 89 year old male admitted to [facility] on 6/6/2020 due to confusion. PMHx includes angina, arrhythmia, CAD, dental disorder, macular degeneration, pneumonia, renal disorder, stroke. PMHx includes PAF, hyponatremia, and acute ischemic left MCA stroke. CXR dated 6/6/20 showed \"Minimal bibasilar opacities likely atelectasis/scarring.\" Brain MRI dated 6/6/20 showed \"Small area of acute infarct in the left temporal and parietal lobes and chronic infarcts in the right frontal lobes.\"      Pt seen today for aphasia evaluation. Pt was administered the Western Aphasia Battery - Bedside, Revised standardized assessment. Pt scored 7/10 for Spontaneous Speech: Fluency, 5/10 for Auditory Verbal Comprehension, 2/10 for Repetition and 0/10 for all other subcategories: Spontaneous Speech: Content, Sequential Commands, and Object Naming. Pt received a Bedside Aphasia Classification Criteria of \"Wernicke's Aphasia.\"    Pt with good effort noted throughout session; however, Pt with multiple semantic and phonemic paraphasias, neologisms, and jargon during both structured and spontaneous speech tasks. Various compensatory strategies were utilized; however, Pt's ability to comprehend instructions did not improve through written cues, elaboration, gestures and/or pictures. Pt was able; however, to count 1-5 and state letters A-F of the alphabet correctly. Pt is recommended for aggressive aphasia tx, as well as 24/7 supervision following discharge given severe expressive/receptive language skills, severe functional communication skills and object agnosia.     Plan    Recommend Speech Therapy 5 times per week until therapy " goals are met for the following treatments:  Dysphagia Training, Comprehension Training, Expression Training, Reading Training, Writing Training and Patient / Family / Caregiver Education.    Discharge recommendations:  Recommend inpatient transitional care services for continued speech therapy services.      Objective       06/08/20 1440   Verbal Expression   Vocal Quality Clear   Verbal Output Automatic Moderate (3)   Verbal Output: Phrases Severe (2)   Verbal Output Conversation Profound (1)   Verbal Output Functional Profound (1)   Repetition: Single Words Moderate (3)   Repetition: Phrases Severe (2)   Repetition: Sentences Profound (1)   Naming Profound (1)   Dysarthria Within Functional Limits (6-7)   Perseverations Minimal (4)   Paraphasias Severe (2)   Auditory Comprehension   Yes / No Questions: Personal Information Minimal (4)   Yes / No Questions: General Information Minimal (4)   Yes / No Questions: Abstract Minimal (4)   Identifies Objects Severe (2)   Identifies Body Parts Severe (2)   Follows One Unit Commands Moderate (3)   Understands Simple, Structured Conversation  Severe (2)   Reading Comprehension   Reading Comprehension (WDL)   (Pt unable to follow directives)   Written Expression   Functional Writing: Name Severe (2)   Overall Legibility Severe (2)   Aphasia Compensatory Strategies   Compensatory Strategies Used To Communicate Yes / No Responses;Reading Written Words;Multiple Choice Stimuli;Singing;Repetition / Imitation;Limiting Utterance Lengths   Outcome Measures   Outcome Measures Utilized WAB-Bedside   WAB - Bedside (Western Aphasia Battery)   Spontaneous Speech: Content  1   Spontaneous Speech: Fluency 7   Auditory Comprehension 5   Sequential Commands 0   Repetition Score  2   Object Naming 0   Reading 0   Writing 0   Apraxia  5   Bedside Asphasia Score 25   Bedside Language Score 18.75   Short Term Goals   Short Term Goal # 1 Pt will consume a diet of SB6/TN0 with no s/sx of  aspiration and min cues.    Goal Outcome # 1 Progressing as expected   Short Term Goal # 2 Pt will recite automatic speech tasks with 90% accuracy with mod-A across two sessions.   Short Term Goal # 3 Pt will state personal information (name, phone #, ) with mod-I and less than two errors.
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
## 2020-06-08 NOTE — THERAPY
Speech Language Pathology  Daily Treatment     Patient Name: Alistair Tavares  Age:  89 y.o., Sex:  male  Medical Record #: [id number]  Today's Date: 6/8/2020     Precautions: Fall Risk, Swallow Precautions (See Comments)  Comments: expressive and receptive aphasia, [employer], impulsive per PT/OT notes    Assessment    Pt seen today for f/u dysphagia tx session and therapeutic feeding tx with lunch tray of SB6/TN0 textures. Pt demonstrated appropriate feeding rate and bolus size; however, occasionally inspected his utensils, cups and plates between bites. Pt also positioned somewhat crooked in bed, requiring assistance to sit fully upright and with tray in front. Pt with aphasia, semantic/phonemic paraphasias, and neologisms noted throughout session. Pt with no overt clinical s/sx of aspiration/penetration with lunch tray items and oral cavity was clean/clear. However, Pt attempted to  baby lotion container, and his juice, and appeared confused re: which one was to drink from.    At this time, recommend strict 1:1/direct supervision with meals (given noted object agnosia), and to con't current diet of Soft, bite-sized solids with Thin liquids and meds per tolerance (x1 at a time.) Please hold PO if any difficulties/concerns or change in status.    Plan    Continue current treatment plan.    Discharge recommendations:  Recommend inpatient transitional care services for continued speech therapy services.         Objective       06/08/20 1350   Dysphagia    Positioning / Behavior Modification Self Monitoring;Modulate Rate or Bite Size;Alternate Solids and Liquids   Other Treatments Lunch tray of SB6/TN0   Diet / Liquid Recommendation Soft & Bite-Sized (6) - (Dysphagia III);Thin (0)   Nutritional Liquid Intake Rating Scale Non thickened beverages   Nutritional Food Intake Rating Scale Total oral diet with multiple consistencies without special preparation but with specific food limitations   Nursing Communication  Swallow Precaution Sign Posted at Head of Bed   Skilled Intervention Compensatory Strategies;Verbal Cueing   Recommended Route of Medication Administration   Medication Administration  Whole with Liquid Wash;Float Whole with Puree   Short Term Goals   Short Term Goal # 1 Pt will consume a diet of SB6/TN0 with no s/sx of aspiration and min cues.    Goal Outcome # 1 Progressing as expected

## 2020-06-08 NOTE — PROGRESS NOTES
"Rekha, wife at bedside. Educated wife that blood thinners are recommended for treatment of Afib and stroke prevention. Good understanding demonstrated. Wife states, \"If that's what we have to do, then that's what we're willing to do.\" MD Sheeba notified. Eliquis 5mg BID ordered.    1654pm Addendum: Kalyani, PT and Sandra, SLP saw patient today and recommending inpatient rehab or snf. Wife refusing inpatient therapies at this time due to COVID and patient's intermittent agitation. Patient has been calm and appropriate today with only one episode of agitation this morning.    MD Sheeba at bedside speaking to wife explaining benefit of inpatient therapies and concern that patient may require more assistance than wife can handle. Wife continues to refuse rehab and snf.    New plan is Home with home Health and FWW.  "

## 2020-06-09 PROCEDURE — 770020 HCHG ROOM/CARE - TELE (206)

## 2020-06-09 PROCEDURE — 99232 SBSQ HOSP IP/OBS MODERATE 35: CPT | Performed by: INTERNAL MEDICINE

## 2020-06-09 PROCEDURE — 99232 SBSQ HOSP IP/OBS MODERATE 35: CPT | Performed by: PHYSICAL MEDICINE & REHABILITATION

## 2020-06-09 PROCEDURE — A9270 NON-COVERED ITEM OR SERVICE: HCPCS | Performed by: HOSPITALIST

## 2020-06-09 PROCEDURE — 700102 HCHG RX REV CODE 250 W/ 637 OVERRIDE(OP): Performed by: HOSPITALIST

## 2020-06-09 PROCEDURE — A9270 NON-COVERED ITEM OR SERVICE: HCPCS | Performed by: INTERNAL MEDICINE

## 2020-06-09 PROCEDURE — 700111 HCHG RX REV CODE 636 W/ 250 OVERRIDE (IP): Performed by: INTERNAL MEDICINE

## 2020-06-09 PROCEDURE — 700102 HCHG RX REV CODE 250 W/ 637 OVERRIDE(OP): Performed by: INTERNAL MEDICINE

## 2020-06-09 PROCEDURE — 97535 SELF CARE MNGMENT TRAINING: CPT | Mod: CO

## 2020-06-09 PROCEDURE — 97116 GAIT TRAINING THERAPY: CPT

## 2020-06-09 PROCEDURE — 92507 TX SP LANG VOICE COMM INDIV: CPT

## 2020-06-09 RX ADMIN — APIXABAN 5 MG: 5 TABLET, FILM COATED ORAL at 17:21

## 2020-06-09 RX ADMIN — DIPHENHYDRAMINE HYDROCHLORIDE 25 MG: 50 INJECTION, SOLUTION INTRAMUSCULAR; INTRAVENOUS at 21:14

## 2020-06-09 RX ADMIN — APIXABAN 5 MG: 5 TABLET, FILM COATED ORAL at 05:04

## 2020-06-09 RX ADMIN — QUETIAPINE FUMARATE 25 MG: 25 TABLET ORAL at 17:21

## 2020-06-09 RX ADMIN — FINASTERIDE 5 MG: 5 TABLET, FILM COATED ORAL at 17:21

## 2020-06-09 RX ADMIN — DIPHENHYDRAMINE HYDROCHLORIDE 25 MG: 50 INJECTION, SOLUTION INTRAMUSCULAR; INTRAVENOUS at 05:14

## 2020-06-09 RX ADMIN — DOCUSATE SODIUM 50 MG AND SENNOSIDES 8.6 MG 2 TABLET: 8.6; 5 TABLET, FILM COATED ORAL at 05:04

## 2020-06-09 ASSESSMENT — COGNITIVE AND FUNCTIONAL STATUS - GENERAL
HELP NEEDED FOR BATHING: A LITTLE
CLIMB 3 TO 5 STEPS WITH RAILING: A LOT
STANDING UP FROM CHAIR USING ARMS: A LITTLE
MOVING TO AND FROM BED TO CHAIR: A LITTLE
SUGGESTED CMS G CODE MODIFIER DAILY ACTIVITY: CK
DRESSING REGULAR UPPER BODY CLOTHING: A LITTLE
MOBILITY SCORE: 16
SUGGESTED CMS G CODE MODIFIER MOBILITY: CK
DRESSING REGULAR LOWER BODY CLOTHING: A LITTLE
DAILY ACTIVITIY SCORE: 18
TURNING FROM BACK TO SIDE WHILE IN FLAT BAD: A LITTLE
EATING MEALS: A LITTLE
MOVING FROM LYING ON BACK TO SITTING ON SIDE OF FLAT BED: A LOT
PERSONAL GROOMING: A LITTLE
WALKING IN HOSPITAL ROOM: A LITTLE
TOILETING: A LITTLE

## 2020-06-09 ASSESSMENT — GAIT ASSESSMENTS
GAIT LEVEL OF ASSIST: MINIMAL ASSIST
DISTANCE (FEET): 300
ASSISTIVE DEVICE: FRONT WHEEL WALKER;NONE

## 2020-06-09 ASSESSMENT — LIFESTYLE VARIABLES: EVER_SMOKED: NEVER

## 2020-06-09 NOTE — FACE TO FACE
Face to Face Note  -  Durable Medical Equipment    Elsei Aly M.D. - NPI: [id number]  I certify that this patient is under my care and that they have had a durable medical equipment(DME)face to face encounter by myself that meets the physician DME face-to-face encounter requirements with this patient on:    Date of encounter:   Patient:                    MRN:                       YOB: 2020  Alistair Tavares  [id number]  10/23/1924     The encounter with the patient was in whole, or in part, for the following medical condition, which is the primary reason for durable medical equipment:  CVA    I certify that, based on my findings, the following durable medical equipment is medically necessary:  Walkers.    HOME O2 Saturation Measurements:(Values must be present for Home Oxygen orders)         ,     ,         My Clinical findings support the need for the above equipment due to:  Abnormal Gait    Supporting Symptoms: n/a     ------------------------------------------------------------------------------------------------------------------    Face to Face Supporting Documentation - Home Health    The encounter with this patient was in whole or in part the primary reason for home health admission.    Date of encounter:   Patient:                    MRN:                       YOB: 2020  Alistair Tavares  [id number]  10/23/1924     Home health to see patient for:  Skilled Nursing care for assessment, interventions & education, Home health aide, Physical Therapy evaluation and treatment, Occupational therapy evaluation and treatment and Speech Language Pathology evaluation and treatment    Skilled need for:  Exacerbation of Chronic Disease State CVA, afib    Skilled nursing interventions to include:  Comment: med mgmt    Homebound evidenced status by:  Need the aid of supportive devices such as crutches, canes, wheelchairs or walkers or Needs the assistance of another  person in order to leave the home. Leaving home must require a considerable and taxing effort. There must exist a normal inability to leave the home.    Community Physician to provide follow up care: Alex Flores III, D.O.     Optional Interventions    Wound information & treatment:    Home Infusion Therapy orders:    Line/Drain/Airway:    I certify the face to face encounter for this home care referral meets the CMS requirements and the encounter/clinical assessment with the patient was, in whole, or in part, for the medical condition(s) listed above, which is the primary reason for home health care. Based on my clinical findings: the service(s) are medically necessary, support the need for home health care, and the homebound criteria are met.  I certify that this patient has had a face to face encounter by myself.  Elsie Aly M.D. - ELISABETHI: [id number]    *Debility, frailty and advanced age in the absence of an acute deterioration or exacerbation of a condition do not qualify a patient for home health.

## 2020-06-09 NOTE — PROGRESS NOTES
"    Physical Medicine and Rehabilitation Consultation         Follow Up Consult      Initial Consultation Date: 6/8/2020  Consulting provider: Dr. Joiner  Reason for consultation: assess for acute inpatient rehab appropriateness  LOS: 3 Day(s)      Chief complaint: stroke      INTERVAL HISTORY:   -improvement in his Wernicke's able to understand and verbalize better than yesterday  -able to mimic gestures during exam; states that he is right handed  -TCC unable to reach wife; chart review shows patient/wife not interested in SNF/IRF due to concern for Covid  -Plan is for patient to go home with home health       ROS:  Limited by aphasia       HPI:   The patient is a 89 y.o. right-hand dominant male with a past medical history of Afib, CAD, CVA;  who presented on 6/6/2020 10:00 AM with AMS and speech difficulties, found to have acute stroke in left parietal/temporal lobes. Severe agitation and aggression noted overnight of 6/6-6/7. Na improved.     The patient currently is not able to verbalize much, especially limited by receptive aphasia, but able to intermittently follow along with gestures. Bedside RN helpful in providing additional info, wife will likely be visiting around 3pm. Patient able to move all 4 limbs, no chow, having BM.       Social Hx:  Unable to obtain pre-morbid status at this time, due to aphasia and inability to contact family        Current level of function: The patient was evaluated by:  OT, 6/7: Min A for mobility, ADLs  SLP, 6/8: Wernicke’s aphasia; 24/7 supervision on discharge   SLP, 6/7: dysphagia III diet, thin liquids  PT, 6/8: Min A x250 ft with FWW x2  PT, 6/7: Min A x125 ft with FWW and also without assistive device; \"shuffled gait\" and \"large stpes and throwing FWW ahead of him\"           PMH:  Past Medical History:   Diagnosis Date   • Angina 2005    episode of atrial fibrillation   • Arrhythmia     History of afib, no longer on medication   • CAD (coronary artery disease) " "    paroximal atrial tachycardia   • CATARACT     asim iol   • Dental disorder     lower dentures   • Macular degeneration of both eyes    • Pneumonia 2008    Not hospitalized for it   • Renal disorder 8/4/2012    pyelonephritis   • Stroke (HCC) 2015    pt reports no residual weakness   • Unspecified urinary incontinence     dribbles         PSH:  Past Surgical History:   Procedure Laterality Date   • CYSTOSCOPY  6/13/2016    Procedure: CYSTOSCOPY 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;  Surgeon: Bucky Bustamante M.D.;  Location: SURGERY [facility];  Service:    • CATARACT PHACO WITH IOL  10/28/2009    Performed by PAULA COE at SURGERY SAME DAY [facility] ORS   • CATARACT PHACO WITH IOL  10/14/2009    Performed by PAULA COE at SURGERY SAME DAY [facility] ORS   • OTHER ORTHOPEDIC SURGERY      PATELLA RIGHT         FHX: Reviewed.   Family History   Problem Relation Age of Onset   • Lung Disease Father         emphysima   • Arthritis Neg Hx          Medications:  Current Facility-Administered Medications   Medication Dose   • QUEtiapine (SEROQUEL) tablet 25 mg  25 mg   • QUEtiapine (SEROQUEL) tablet 25 mg  25 mg   • apixaban (ELIQUIS) tablet 5 mg  5 mg   • diphenhydrAMINE (BENADRYL) injection 25 mg  25 mg   • finasteride (PROSCAR) tablet 5 mg  5 mg   • senna-docusate (PERICOLACE or SENOKOT S) 8.6-50 MG per tablet 2 Tab  2 Tab    And   • polyethylene glycol/lytes (MIRALAX) PACKET 1 Packet  1 Packet    And   • magnesium hydroxide (MILK OF MAGNESIA) suspension 30 mL  30 mL    And   • bisacodyl (DULCOLAX) suppository 10 mg  10 mg   • acetaminophen (TYLENOL) tablet 650 mg  650 mg   • ondansetron (ZOFRAN) syringe/vial injection 4 mg  4 mg   • ondansetron (ZOFRAN ODT) dispertab 4 mg  4 mg       Allergies:  Allergies   Allergen Reactions   • Bloodless Unspecified     Pt states he is willing to accept blood/blood products as \"a last resort\"   • Coufarin [Warfarin] Unspecified     Pts wife states " "\"He just does not like that drug\".   • Crestor [Rosuvastatin Calcium] Rash and Itching     Rash/itching all over body   • Flomax [Tamsulosin] Unspecified     Pts wife states \"His BP goes really low and he fell down\"   • Heparin Hives, Rash and Itching     Pts wife states \"Rash all over body\".   • Lipitor [Atorvastatin] Rash     Pts wife states \"Rash all over body\".   • Macrobid [Kdc:Red Dye+Yellow Dye+Nitrofurantoin+Brilliant Blue Fcf] Nausea     Pt's wife states \"Makes pt weak and nausea\".   • Statins [Hmg-Coa-R Inhibitors] Rash and Itching     Rash/itching all over body         Physical Exam:  Vitals: BP (!) 95/74   Pulse 87   Temp 36.3 °C (97.3 °F) (Temporal)   Resp 16   Wt 72 kg (158 lb 11.7 oz)   SpO2 95%     Gen: pleasant  Head: no visible head lesions or abrasion  Eyes/ Nose/ Mouth: moist mucous membranes  Cardio: Well perfused extremities, no peripheral edema  Pulm: on room air, with normal respiratory effort  Abd: Soft NTND  Ext: No atrophy of muscles, no joint contractures of extremities   Psych: calm affect, interval improvement in ability to follow simple commands     Neuro:   -Hearing and visual acuity functional and grossly intact    Motor: *difficult to assess due to receptive aphasia, mimics some simple behaviors  -Bilateral upper extremities: 4/5 with arm abduction, elbow flexion, elbow extension, finger flexion  -Bilateral lower extremities: 4/5 with hip flexion, knee extension, ankle dorsiflexion,  plantarflexion          Labs: Reviewed and significant for 6/8/20: Hgb 14.8, Na 135, K 4.4, Cr 0.77, WBC 10   Recent Labs     06/08/20  0848   RBC 4.65*   HEMOGLOBIN 14.8   HEMATOCRIT 44.7   PLATELETCT 239     Recent Labs     06/07/20  0322 06/08/20  0848   SODIUM 135 134*   POTASSIUM 4.4 4.8   CHLORIDE 101 103   CO2 21 22   GLUCOSE 92 93   BUN 15 22   CREATININE 0.77 0.75   CALCIUM 9.6 9.5     No results found for this or any previous visit (from the past 24 hour(s)).        Imaging:   Ct-cta Head " With & W/o-post Process  Result Date: 6/6/2020 6/6/2020 4:39 PM HISTORY/REASON FOR EXAM:  Stroke, follow up Abnormal head CT TECHNIQUE/EXAM DESCRIPTION: CT angiogram of the [place] of [place] without and with contrast.  Initial precontrast images were obtained of the head from the skull base through the vertex.  Postcontrast images were obtained of the [place] of [place] following the power injection of nonionic contrast at 5.0 mL/sec. Thin-section helical images were obtained with overlapping reconstruction interval. Coronal and sagittal multiplanar volume reformats were generated.  3D angiographic images were reviewed on PACS.  Maximum intensity projection (MIP) images were generated and reviewed. 80 mL of Omnipaque 350 nonionic contrast was injected intravenously. Low dose optimization technique was utilized for this CT exam including automated exposure control and adjustment of the mA and/or kV according to patient size. COMPARISON:  None. FINDINGS: Study is limited by motion artifact. Petrocavernous and supraclinoid ICA segments are patent.  MCA and RONI branches are patent. The intradural vertebral, basilar and posterior cerebral arteries are patent. No occlusion or hemodynamically significant stenosis.  No aneurysm or high flow vascular malformation. 3D angiographic/MIP images of the vasculature confirm the vascular findings as described above.     No proximal intracranial arterial occlusion or hemodynamically significant stenosis.        Ct-cta Neck With & W/o-post Processing  Result Date: 6/6/2020 6/6/2020 4:39 PM HISTORY/REASON FOR EXAM:  Stroke, follow up TECHNIQUE/EXAM DESCRIPTION: CT angiogram of the neck with contrast. Postcontrast images were obtained of the neck from the great vessels through the skull base following the power injection of nonionic contrast at 5.0 mL/sec. Thin-section helical images were obtained with overlapping reconstruction interval. Coronal and oblique multiplanar volume reformats  were generated. Cervical internal carotid artery percent stenosis is calculated using the standard method according to the NASCET criteria wherein a segment of uniform caliber mid or distal cervical internal carotid is used as the reference denominator. 3D angiographic images were reviewed on PACS.  Maximum intensity projection (MIP) images were generated and reviewed 80 mL of Omnipaque 350 nonionic contrast was injected intravenously. Low dose optimization technique was utilized for this CT exam including automated exposure control and adjustment of the mA and/or kV according to patient size. COMPARISON:  04/08/2015 FINDINGS: Calcified atherosclerotic plaque is noted in the aortic arch and its branch vessels. There is calcified plaque in the carotid bulb and ICA bilaterally. There is 50% diameter reduction narrowing at the origin of the right internal carotid artery. No other evidence of stenosis or occlusion involving the right common carotid artery carotid bulb or internal carotid artery is identified. External carotid artery is patent. The left common carotid artery, cervical carotid bifurcation, and cervical internal carotid artery are within normal limits. There is no evidence of significant stenosis, thrombus, or other filling defect or ulceration. There is no evidence of dissection  or aneurysm. The cervical vertebral arteries are normal bilaterally. The neck soft tissues and lung apices in the field of view are unremarkable. 3D angiographic/MIP images of the vasculature confirm the vascular findings as described above.     1.  Calcific atherosclerosis is noted in the aorta carotid arteries and branch vessels of the aorta with some interval increase compared to the prior exam. 2.  There is 50% diameter reduction narrowing at the origin of the right internal carotid artery due to presence of calcified atherosclerotic plaque in the proximal ICA which extends superiorly from the right carotid bulb. Narrowing at  this location is increased compared to the prior exam. 3.  No evidence of significant stenosis in the left ICA. Diameter reduction between 0% and 49%. 4.  No vertebral artery stenosis or occlusion is identified.        Ct-head W/o  Result Date: 6/6/2020 6/6/2020 10:17 AM HISTORY/REASON FOR EXAM:  Altered mental status. TECHNIQUE/EXAM DESCRIPTION AND NUMBER OF VIEWS: CT of the head without contrast. The study was performed on a helical multidetector CT scanner. Contiguous 2.5 mm axial sections were obtained from the skull base through the vertex. Up to date radiation dose reduction adjustments have been utilized to meet ALARA standards for radiation dose reduction. COMPARISON:  4/8/2015 FINDINGS: Mild/moderate generalized volume loss. Confluent hypodensities in the cerebral white matter most likely represent advanced chronic microvascular ischemic changes. Old small right frontal lobe infarct. Old tiny right parietal cortical infarct. Hypodensity in the left temporal lobe suggesting moderate subacute appearing infarct. No acute intracranial hemorrhage. No mass effect, midline shift or hydrocephalus. Paranasal sinuses and mastoids are clear. No depressed calvarial fracture.     1.  Subacute appearing moderate left temporal lobe infarct. Brain MRI may be performed to further evaluate. 2.  Advanced chronic ischemic changes. 3.  No acute intracranial hemorrhage.          Mr-brain-w/o  Result Date: 6/6/2020 6/6/2020 1:40 PM HISTORY/REASON FOR EXAM:  Stroke, follow up. TECHNIQUE/EXAM DESCRIPTION: MRI of the brain without contrast. T1 sagittal, T2 fast spin-echo axial, T1 coronal, FLAIR coronal, diffusion-weighted and apparent diffusion coefficient (ADC map) axial images were obtained of the whole brain. The study was performed on a [employer] 1.5 Vandana MRI scanner. COMPARISON:  4/9/15 FINDINGS: A small area of acute infarct is seen in the left parietal and posterior temporal lobe. There is no hemorrhage transformation.  Chronic infarcts are noted in the right frontal lobes. Diffuse nonspecific T2 subcortical and periventricular white matter. Moderate cerebral volume loss is seen. There is no intra-axial space-occupying lesion. A partially empty sella is seen. The ventricles, cortical sulci and the basal cisterns are prominent consistent with moderate cerebral volume loss. There is no extra-axial fluid collection, hemorrhage or mass. The visualized flow voids of the cerebral vasculature are unremarkable.  There is no large lesion identified in the expected course of the intracranial portions of the cranial nerves. The skull bones are unremarkable. The paranasal sinuses are clear. The extracranial soft tissue including orbits appear grossly normal.     1.  Small area of acute infarct in the left temporal and parietal lobes. 2.  Chronic infarcts in the right frontal lobes. 3.  Moderate chronic microvascular ischemic disease. 4.  Moderate cerebral volume loss.        Ec-echocardiogram Complete W/o Cont  Result Date: 6/7/2020  Transthoracic Echo Report Echocardiography Laboratory CONCLUSIONS Prior echocardiogram 4/9/2015 .Left ventricular ejection fraction is visually estimated to be 70%. Moderate aortic stenosis.  Calculated RUBEN is 0.95 cm2.  Cannot rule out severe AS.  Consider dobutamine stress echo. Estimated right ventricular systolic pressure  is 45 mmHg.             ASSESSMENT:  Patient is a 89 y.o. male admitted with left parietal & temporal stroke, causing both receptive and expressive aphasia.        # Rehabilitation: Impaired ADLs and mobility; off sitter and off lap belt, more communicative; lives on second floor with spouse, has to climb up stairs   -Vitals stable  -Insurance: [facility] and Medicare  -TCC reaching out to family to verify discharge support  -Rehab Impairment Code: 0001.2 - Stroke: Right Body Involvement (Left Brain)  -Meets criteria for IRF, but wife not interested in SNF or IRF due to concern for Covid  -Current  "plan is for discharge home with home health        #Neuro: left temporal & parietal lobe acute CVA; rosuvastatin DC'ed due to allergies; Echo 6/7/20: EF 70%, moderate (?severe) aortic stenosis, \"consider dobutamine stress echo\", no mention of PFO; haldol discontinued  -SBP < 140, normotension in 1-2 weeks  -normoglycemia  -asa  -dobutamine stress echo deferred as outpatient  -agree with seroquel  -If needed, consider second generation antipsychotics for acute delirium management with ziprasidone 10 mg IM Q4H PRN        #CV: paroxysmal afib, rate controlled  -asa, apixaban  -see \"Neuro\" above      #Hyponatremia: improved to Na 135      #Bladder: self-cath at baseline, finasteride       #DVT:   -apixaban         Discussed with pt, summarized hospitalization and care, options for next step of care  Labs reviewed   Discussed with rehab team about recommendations       Thank you for allowing us to participate in the care of this patient.           Korey Webster MD  Physical Medicine and Rehabilitation   6/9/2020        "

## 2020-06-09 NOTE — PROGRESS NOTES
Hospital Medicine Daily Progress Note    Date of Service  6/9/2020    Chief Complaint  89 y.o. male admitted 6/6/2020 with altered mental status, acute expressive aphasia. Has A-Fib (not on anticoagulation), CAD, CVA.      Hospital Course  CT head showed head showed hypodensity in the left temporal lobe consistent with infarction and MRI brain confirmed left acute ischemic parietal and temporal infarct.  Wife and patient refused to start AC.        Echocardiogram showed LVEF ~ 70%, moderate to severe AS. Developed a rash with Lipitor, improved with Benadryl.     Interval Problem Update  Stable. Likely home with HH in AM    Consultants/Specialty  Neurology    Code Status  Full Code    Disposition  SNF    Review of Systems  Review of Systems   Unable to perform ROS: Acuity of condition        Physical Exam  Temp:  [36.2 °C (97.2 °F)-36.7 °C (98 °F)] 36.3 °C (97.3 °F)  Pulse:  [82-98] 87  Resp:  [16-18] 16  BP: ()/(60-81) 137/81  SpO2:  [89 %-99 %] 89 %    Physical Exam  Constitutional:       General: He is not in acute distress.     Appearance: He is not ill-appearing.   HENT:      Head: Normocephalic.      Nose: Nose normal.   Eyes:      Pupils: Pupils are equal, round, and reactive to light.   Neck:      Musculoskeletal: Normal range of motion.   Cardiovascular:      Rate and Rhythm: Normal rate. Rhythm irregular.   Pulmonary:      Effort: Pulmonary effort is normal.   Abdominal:      Palpations: Abdomen is soft.   Musculoskeletal:      Right lower leg: No edema.      Left lower leg: No edema.   Skin:     General: Skin is warm.   Neurological:      Mental Status: Mental status is at baseline.   Psychiatric:         Behavior: Behavior normal.         Fluids    Intake/Output Summary (Last 24 hours) at 6/9/2020 1940  Last data filed at 6/9/2020 1800  Gross per 24 hour   Intake 720 ml   Output --   Net 720 ml       Laboratory  Recent Labs     06/08/20  0848   WBC 10.6   RBC 4.65*   HEMOGLOBIN 14.8   HEMATOCRIT  44.7   MCV 96.1   MCH 31.8   MCHC 33.1*   RDW 44.7   PLATELETCT 239   MPV 9.9     Recent Labs     06/07/20  0322 06/08/20  0848   SODIUM 135 134*   POTASSIUM 4.4 4.8   CHLORIDE 101 103   CO2 21 22   GLUCOSE 92 93   BUN 15 22   CREATININE 0.77 0.75   CALCIUM 9.6 9.5             Recent Labs     06/07/20  0322   TRIGLYCERIDE 67   HDL 59   LDL 83       Imaging  EC-ECHOCARDIOGRAM COMPLETE W/O CONT   Final Result      CT-CTA HEAD WITH & W/O-POST PROCESS   Final Result      No proximal intracranial arterial occlusion or hemodynamically significant stenosis.      CT-CTA NECK WITH & W/O-POST PROCESSING   Final Result      1.  Calcific atherosclerosis is noted in the aorta carotid arteries and branch vessels of the aorta with some interval increase compared to the prior exam.      2.  There is 50% diameter reduction narrowing at the origin of the right internal carotid artery due to presence of calcified atherosclerotic plaque in the proximal ICA which extends superiorly from the right carotid bulb. Narrowing at this location is    increased compared to the prior exam.      3.  No evidence of significant stenosis in the left ICA. Diameter reduction between 0% and 49%.      4.  No vertebral artery stenosis or occlusion is identified.      MR-BRAIN-W/O   Final Result      1.  Small area of acute infarct in the left temporal and parietal lobes.   2.  Chronic infarcts in the right frontal lobes.   3.  Moderate chronic microvascular ischemic disease.   4.  Moderate cerebral volume loss.      DX-CHEST-PORTABLE (1 VIEW)   Final Result      Minimal bibasilar opacities likely atelectasis/scarring.      CT-HEAD W/O   Final Result      1.  Subacute appearing moderate left temporal lobe infarct. Brain MRI may be performed to further evaluate.   2.  Advanced chronic ischemic changes.   3.  No acute intracranial hemorrhage.           Assessment/Plan  * Acute ischemic left MCA stroke (HCC)  Assessment & Plan  Neurology consulted.    Underlying pA-Fib, has not been on anticoagulation  On Eliquis and ASA. 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

## 2020-06-09 NOTE — CARE PLAN
Problem: Safety  Goal: Free from accidental injury  Outcome: PROGRESSING AS EXPECTED  Pt educated on importance of using call light when needing assistance r/t deficits from stroke. Reinforcement needed. Pt doesn't call appropriately. Call light and belongings within reach. Bed locked in lowest position. Bed alarm on. Fall precautions in place.     Problem: Skin Integrity  Goal: Skin Integrity is maintained or improved  Outcome: PROGRESSING AS EXPECTED  Pt educated on importance of Q2H turns r/t deficits from stroke. Reinforcement needed. Q2H turns in place. Moisturizer applied to red/itching skin.

## 2020-06-09 NOTE — THERAPY
Physical Therapy   Daily Treatment     Patient Name: Alistair Tavares  Age:  89 y.o., Sex:  male  Medical Record #: [id number]  Today's Date: 6/8/2020     Precautions: Fall Risk, Swallow Precautions, Receptive and Expressive Aphasia, Impulsive    Assessment    Pt seen for follow up PT tx. Pt cooperative and followed most single step commands when provided with verbal and visual cues. Pt demonstrated increased safety when ambulating with FWW but continues to require min assist due to occasional LOB. Pt tends to get distracted when ambulating in hallway requiring assistance with FWW maneuvering. Pt is a high fall risk due to cognition, impulsive behaviors, and balance deficits. PT will continue to work with patient while in acute care setting.     Plan    Continue current treatment plan.    Discharge recommendations:  Recommend post-acute placement for continued physical therapy services prior to discharge home.          06/08/20 1512   Gait Analysis   Gait Level Of Assist Minimal Assist   Assistive Device Front Wheel Walker   Distance (Feet) 250   # of Times Distance was Traveled 2   Deviation Shuffled Gait;Increased Base Of Support;Other (Comment)  (large step length )   # of Stairs Climbed 0   Weight Bearing Status FWB   Skilled Intervention Verbal Cuing;Tactile Cuing;Sequencing   Comments increased safety with FWW. Pt ambulated with significant kyphosis   Bed Mobility    Supine to Sit Supervised   Sit to Supine Supervised   Scooting Supervised   Functional Mobility   Sit to Stand Supervised   Bed, Chair, Wheelchair Transfer Minimal Assist   Transfer Method Other (Comments)  (ambulated)   Mobility in room and hallway with FWW   Skilled Intervention Verbal Cuing;Tactile Cuing;Sequencing   Short Term Goals    Short Term Goal # 1 Pt will be able to ambulate with LRAD and SPV in 6tx in order to return home.   Goal Outcome # 1 Progressing as expected   Short Term Goal # 2 Pt will be able to complete functional transfers  with LRAD and SPV in 6tx in order to return home   Goal Outcome # 2 Progressing as expected   Anticipated Discharge Equipment   DC Equipment Unable To Determine At This Time

## 2020-06-09 NOTE — DISCHARGE PLANNING
Care Transition Team Assessment  NOK Spouse Rekha [phone]    LSW completed assessment via phone with spouse Rekha. Rekha confirmed info on facesheet. Pt lives in an apartment on the second floor with spouse. There are stairs upon entering the home. Pt uses [facility] in [place] [address] for his pharmacy or [facility] on [address]. Pt is reported to be independent with ADL/iADLs. Rekha supervises pt while in shower. Rekha would like FWW for pt. No hx of sa or mh.     Pt has previously used [facility]. Upon dc Rekha would like to be assisted with transportation due to stairs.     Information Source  Orientation : Unable to Assess  Information Given By: Spouse  Informant's Name: Rekha   Who is responsible for making decisions for patient? : Spouse  Name(s) of Primary Decision Maker: Rekha    Readmission Evaluation  Is this a readmission?: No    Elopement Risk  Legal Hold: No  Ambulatory or Self Mobile in Wheelchair: Yes  Disoriented: Time-At Risk for Elopement, Place-At Risk for Elopement, Situation-At Risk for Elopement  Elopement Risk: Not at Risk for Elopement  Wanderguard On: Unavailable  Personal Belongings: Hospital Clothing Only    Interdisciplinary Discharge Planning  Lives with - Patient's Self Care Capacity: Spouse  Patient or legal guardian wants to designate a caregiver (see row info): No  Housing / Facility: [address]  Prior Services: Unable To Determine At This Time    Discharge Preparedness  What is your plan after discharge?: Home with help, Home health care  What are your discharge supports?: Spouse  Prior Functional Level: Ambulatory, Drives Self, Independent with Activities of Daily Living  Difficulity with ADLs: Bathing, Walking  Difficulty with ADLs Comment: Would like FWW, supervise shower  Difficulity with IADLs: None    Functional Assesment  Prior Functional Level: Ambulatory, Drives Self, Independent with Activities of Daily Living    Finances  Financial Barriers to  Discharge: No  Prescription Coverage: Yes    Vision / Hearing Impairment  Right Eye Vision: Impaired  Left Eye Vision: Impaired  Hearing Impairment: Both Ears              Domestic Abuse  Have you ever been the victim of abuse or violence?: No    Psychological Assessment  History of Substance Abuse: None  History of Psychiatric Problems: No  Non-compliant with Treatment: No    Discharge Risks or Barriers  Discharge risks or barriers?: No    Anticipated Discharge Information  Anticipated discharge disposition: [facility], Home  Discharge Address: Home: [address] [place], [place] [address]ilya DOMÍNGUEZ [facility] [place] [address]  Discharge Contact Phone Number: [phone]         Jim Otto(Attending)

## 2020-06-09 NOTE — THERAPY
Physical Therapy   Daily Treatment     Patient Name: Alistair Tavares  Age:  89 y.o., Sex:  male  Medical Record #: [id number]  Today's Date: 6/9/2020     Precautions: Fall Risk, Swallow Precautions, Expressive and Receptive Aphasia, Impuslive    Assessment    Pt seen for follow up PT tx. Therapist facilitated gait training with and without FWW. Pt demonstrated increased safety and stability with FWW but pt lifted FWW off the ground several times and appeared confused. Therapist provided min assist for safety although no overt LOB noted. Pt continues to require max cues for safety and sequencing. At this time, pt appears to be most limited by cognition rather than strength or balance. Pt continues to be a very high fall risk. PT will continue to work with patient while in acute care setting.     Plan    Continue current treatment plan.    Discharge recommendations:  Recommend post-acute placement for continued physical therapy services prior to discharge home.         06/09/20 1117   Neuro-Muscular Treatments   Neuro-Muscular Treatments Verbal Cuing;Tactile Cuing;Sequencing;Postural Facilitation;Compensatory Strategies   Comments in standing    Gait Analysis   Gait Level Of Assist Minimal Assist   Assistive Device Front Wheel Walker;None   Distance (Feet) 300   # of Times Distance was Traveled 2   Deviation Shuffled Gait;Increased Base Of Support;Other (Comment)  (kyphotic posture and increased knee flexoin)   # of Stairs Climbed 0   Weight Bearing Status FWB   Skilled Intervention Verbal Cuing;Tactile Cuing;Sequencing;Postural Facilitation;Compensatory Strategies   Comments with increased fatigue, pt demonstrates increased knee flexion. While ambulaitng, pt picked up FWW and lifted it off the ground several times and appeared confused.    Functional Mobility   Sit to Stand Supervised   Skilled Intervention Tactile Cuing;Verbal Cuing;Sequencing;Postural Facilitation   Short Term Goals    Short Term Goal # 1 Pt will  be able to ambulate with LRAD and SPV in 6tx in order to return home.   Goal Outcome # 1 Goal met   Short Term Goal # 2 Pt will be able to complete functional transfers with LRAD and SPV in 6tx in order to return home   Goal Outcome # 2 Goal not met   Anticipated Discharge Equipment   DC Equipment Front-Wheel Walker

## 2020-06-09 NOTE — DISCHARGE PLANNING
Received Choice form at 1100  Agency/Facility Name: [facility]   Referral sent per Choice form at 1130.

## 2020-06-09 NOTE — CARE PLAN
Problem: Safety  Goal: Will remain free from falls  Outcome: PROGRESSING AS EXPECTED  Note: Fall precautions in place as pt is unsteady and confused following acute stroke.     Problem: Communication  Goal: The ability to communicate needs accurately and effectively will improve  Outcome: PROGRESSING SLOWER THAN EXPECTED  Note: Purposeful hourly rounding in place. Anticipating pt needs with rounding to accommodate expressive aphasia associated with acute stroke.

## 2020-06-09 NOTE — DISCHARGE PLANNING
Received Choice form at 1130  Agency/Facility Name: [facility]   Referral sent per Choice form at 1140.     @1300  Agency/Facility Name: [facility]   Spoke To: Juliano   Outcome: Referral is being reviewed, [facility] left number for f/u.       @1500  Agency[facility]Facility Name: [facility]   Spoke To: Maurilio   Outcome: Pt accepted, will be placed on the schedule as soon as d/c summary is sent.

## 2020-06-09 NOTE — THERAPY
Occupational Therapy  Daily Treatment     Patient Name: Alistair Tavares  Age:  89 y.o., Sex:  male  Medical Record #: [id number]  Today's Date: 6/9/2020    Precautions: Fall Risk, Swallow Precautions ( See Comments)  Comments: aphasic, impulsive     Assessment    Pt seen for OT tx. Continues to be limited by aphasia, poor safety awareness and impulsivity. Pt required cues for directions but able to appropriately follow during session. At this time pt is a high fall risk and will require 24/7 supv and physical assistance for ADLs and ADL transfers d/t impaired cognition impacting safety awareness and impulsivity. Continue to recommend placement prior to d/c home to maximize independence in ADLs and ADL transfers.      Plan    Continue current treatment plan.    Discharge recommendations:  Recommend post-acute placement for additional occupational therapy services prior to discharge home.         06/09/20 1501   Cognition    Cognition / Consciousness X   Speech/ Communication Expressive Aphasia   Safety Awareness Impaired;Impulsive   Comments quick w/ movement, able to follow commands appropriately w/ extra time and repeated cues    Bed Mobility    Supine to Sit Supervised   Sit to Supine Supervised   Scooting Supervised   Activities of Daily Living   Grooming Supervision   Upper Body Dressing Minimal Assist   Lower Body Dressing Minimal Assist   Toileting Minimal Assist   Functional Mobility   Sit to Stand Supervised   Bed, Chair, Wheelchair Transfer Minimal Assist   Toilet Transfers Minimal Assist   Short Term Goals   Short Term Goal # 1 pt will demo toilet transfer with SPV   Goal Outcome # 1 Progressing as expected   Short Term Goal # 2 pt will complete 5min standing grooming ADLs at sink with SPV   Goal Outcome # 2 Progressing as expected   Short Term Goal # 3 pt will demo full body dressing with SPV using AE as needed   Goal Outcome # 3 Progressing as expected

## 2020-06-10 VITALS
TEMPERATURE: 97.9 F | HEART RATE: 100 BPM | SYSTOLIC BLOOD PRESSURE: 130 MMHG | WEIGHT: 158.73 LBS | OXYGEN SATURATION: 95 % | DIASTOLIC BLOOD PRESSURE: 69 MMHG | BODY MASS INDEX: 22.78 KG/M2 | RESPIRATION RATE: 16 BRPM

## 2020-06-10 LAB
ANION GAP SERPL CALC-SCNC: 8 MMOL/L (ref 7–16)
BASOPHILS # BLD AUTO: 0.3 % (ref 0–1.8)
BASOPHILS # BLD: 0.04 K/UL (ref 0–0.12)
BUN SERPL-MCNC: 31 MG/DL (ref 8–22)
CALCIUM SERPL-MCNC: 9.3 MG/DL (ref 8.5–10.5)
CHLORIDE SERPL-SCNC: 99 MMOL/L (ref 96–112)
CO2 SERPL-SCNC: 24 MMOL/L (ref 20–33)
CREAT SERPL-MCNC: 0.88 MG/DL (ref 0.5–1.4)
EOSINOPHIL # BLD AUTO: 1.25 K/UL (ref 0–0.51)
EOSINOPHIL NFR BLD: 10.8 % (ref 0–6.9)
ERYTHROCYTE [DISTWIDTH] IN BLOOD BY AUTOMATED COUNT: 43.1 FL (ref 35.9–50)
GLUCOSE SERPL-MCNC: 102 MG/DL (ref 65–99)
HCT VFR BLD AUTO: 41.9 % (ref 42–52)
HGB BLD-MCNC: 14.1 G/DL (ref 14–18)
IMM GRANULOCYTES # BLD AUTO: 0.05 K/UL (ref 0–0.11)
IMM GRANULOCYTES NFR BLD AUTO: 0.4 % (ref 0–0.9)
LYMPHOCYTES # BLD AUTO: 0.85 K/UL (ref 1–4.8)
LYMPHOCYTES NFR BLD: 7.3 % (ref 22–41)
MCH RBC QN AUTO: 31.6 PG (ref 27–33)
MCHC RBC AUTO-ENTMCNC: 33.7 G/DL (ref 33.7–35.3)
MCV RBC AUTO: 93.9 FL (ref 81.4–97.8)
MONOCYTES # BLD AUTO: 1.36 K/UL (ref 0–0.85)
MONOCYTES NFR BLD AUTO: 11.8 % (ref 0–13.4)
NEUTROPHILS # BLD AUTO: 8.02 K/UL (ref 1.82–7.42)
NEUTROPHILS NFR BLD: 69.4 % (ref 44–72)
NRBC # BLD AUTO: 0 K/UL
NRBC BLD-RTO: 0 /100 WBC
PLATELET # BLD AUTO: 233 K/UL (ref 164–446)
PMV BLD AUTO: 9.8 FL (ref 9–12.9)
POTASSIUM SERPL-SCNC: 4.1 MMOL/L (ref 3.6–5.5)
RBC # BLD AUTO: 4.46 M/UL (ref 4.7–6.1)
SODIUM SERPL-SCNC: 131 MMOL/L (ref 135–145)
WBC # BLD AUTO: 11.6 K/UL (ref 4.8–10.8)

## 2020-06-10 PROCEDURE — 85025 COMPLETE CBC W/AUTO DIFF WBC: CPT

## 2020-06-10 PROCEDURE — 80048 BASIC METABOLIC PNL TOTAL CA: CPT

## 2020-06-10 PROCEDURE — 700102 HCHG RX REV CODE 250 W/ 637 OVERRIDE(OP): Performed by: INTERNAL MEDICINE

## 2020-06-10 PROCEDURE — 36415 COLL VENOUS BLD VENIPUNCTURE: CPT

## 2020-06-10 PROCEDURE — A9270 NON-COVERED ITEM OR SERVICE: HCPCS | Performed by: INTERNAL MEDICINE

## 2020-06-10 PROCEDURE — 99239 HOSP IP/OBS DSCHRG MGMT >30: CPT | Performed by: INTERNAL MEDICINE

## 2020-06-10 PROCEDURE — 97535 SELF CARE MNGMENT TRAINING: CPT | Mod: CO

## 2020-06-10 PROCEDURE — 97116 GAIT TRAINING THERAPY: CPT

## 2020-06-10 RX ORDER — QUETIAPINE FUMARATE 25 MG/1
25 TABLET, FILM COATED ORAL EVERY 8 HOURS PRN
Qty: 60 TAB | Refills: 3 | Status: SHIPPED | OUTPATIENT
Start: 2020-06-10 | End: 2020-07-15

## 2020-06-10 RX ORDER — QUETIAPINE FUMARATE 25 MG/1
25 TABLET, FILM COATED ORAL EVERY EVENING
Qty: 60 TAB | Refills: 3 | Status: SHIPPED | OUTPATIENT
Start: 2020-06-10 | End: 2020-07-15

## 2020-06-10 RX ADMIN — APIXABAN 5 MG: 5 TABLET, FILM COATED ORAL at 07:46

## 2020-06-10 RX ADMIN — QUETIAPINE FUMARATE 25 MG: 25 TABLET ORAL at 03:18

## 2020-06-10 ASSESSMENT — COGNITIVE AND FUNCTIONAL STATUS - GENERAL
DAILY ACTIVITIY SCORE: 19
DRESSING REGULAR LOWER BODY CLOTHING: A LITTLE
MOBILITY SCORE: 18
TOILETING: A LITTLE
SUGGESTED CMS G CODE MODIFIER DAILY ACTIVITY: CK
TURNING FROM BACK TO SIDE WHILE IN FLAT BAD: A LITTLE
MOVING TO AND FROM BED TO CHAIR: A LITTLE
SUGGESTED CMS G CODE MODIFIER MOBILITY: CK
WALKING IN HOSPITAL ROOM: A LITTLE
PERSONAL GROOMING: A LITTLE
STANDING UP FROM CHAIR USING ARMS: A LITTLE
MOVING FROM LYING ON BACK TO SITTING ON SIDE OF FLAT BED: A LITTLE
DRESSING REGULAR UPPER BODY CLOTHING: A LITTLE
CLIMB 3 TO 5 STEPS WITH RAILING: A LITTLE
HELP NEEDED FOR BATHING: A LITTLE

## 2020-06-10 ASSESSMENT — GAIT ASSESSMENTS
GAIT LEVEL OF ASSIST: MINIMAL ASSIST
DEVIATION: SHUFFLED GAIT
DISTANCE (FEET): 400
ASSISTIVE DEVICE: FRONT WHEEL WALKER

## 2020-06-10 NOTE — DISCHARGE PLANNING
Received Transport Form at 0931  Spoke to Jonn at [facility]     Transport is scheduled for 1100 going home.    Per Jonn if  feels they can not take pt up the steps to house, they have another  who may be able to accommodate pt at 1200.

## 2020-06-10 NOTE — THERAPY
Physical Therapy   Daily Treatment     Patient Name: Alistair Tavares  Age:  89 y.o., Sex:  male  Medical Record #: [id number]  Today's Date: 6/10/2020     Precautions: Fall Risk, Swallow Precautions    Assessment    Pt seen for follow up PT tx. Pt continues to be most limited by safety awareness and cognition. Pt ambulated around the halls with FWW and negotiated stairs with min assist. Pt requires cues for sequencing and safety. Pt occassionally leaves FWW behind and furniture walks. PT will continue to see pt while in acute care setting.     Plan    Continue current treatment plan.    Discharge recommendations:  Continue to recommend post-acute placement for continued physical therapy services prior to discharge home.  If patient is discharged home with wife, pt will require 24/7 supervision and assist for safety and home health services.        06/10/20 0925   Gait Analysis   Gait Level Of Assist Minimal Assist   Assistive Device Front Wheel Walker   Distance (Feet) 400   # of Times Distance was Traveled 2   Deviation Shuffled Gait   # of Stairs Climbed 10   Level of Assist with Stairs Minimal Assist   Weight Bearing Status FWB   Skilled Intervention Verbal Cuing;Tactile Cuing;Sequencing   Comments several cues for posture   Bed Mobility    Supine to Sit Supervised   Sit to Supine Supervised   Scooting Supervised   Functional Mobility   Sit to Stand Supervised   Bed, Chair, Wheelchair Transfer Supervised   Mobility in room and hallway with FWW   Skilled Intervention Verbal Cuing   Short Term Goals    Short Term Goal # 1 Pt will be able to ambulate with LRAD and SPV in 6tx in order to return home.   Goal Outcome # 1 Goal met   Short Term Goal # 2 Pt will be able to complete functional transfers with LRAD and SPV in 6tx in order to return home   Goal Outcome # 2 Goal met   Short Term Goal # 3 Pt will be able to negotiate FOS with SPV in 3tx in order to enter and exit his apartment   Goal Outcome # 3 Goal not met    Anticipated Discharge Equipment   DC Equipment Front-Wheel Walker

## 2020-06-10 NOTE — CARE PLAN
Problem: Communication  Goal: The ability to communicate needs accurately and effectively will improve  Outcome: PROGRESSING AS EXPECTED  Patient is oriented to self only with global aphasia. Reorientation provided as needed. Education provided to call when needing assistance. Call light is within reach. Hourly rounding in place.      Problem: Skin Integrity  Goal: Skin Integrity is maintained or improved  Outcome: PROGRESSING AS EXPECTED  Patient has a rash from an allergic reaction on his trunk/bilateral arms. Barrier cream applied to all areas. Pt continued to scratch areas with rash. Benadryl 25mg IV administered as ordered for itching.

## 2020-06-10 NOTE — PROGRESS NOTES
Pt became agitated when being helped back to bed from the bathroom. Deescalation techniques were attempted. Patient swung at one of the RNs assisting. Seroquel given for agitation as ordered.

## 2020-06-10 NOTE — THERAPY
Occupational Therapy  Daily Treatment     Patient Name: Alistair Tavares  Age:  89 y.o., Sex:  male  Medical Record #: [id number]  Today's Date: 6/10/2020       Precautions: Fall Risk, Swallow Precautions ( See Comments)  Comments: expressive and receptive aphasia    Assessment    Pt seen for OT tx. Continues to be limited by decreased activity tolerance, aphasia, poor safety awareness, impulsivity and poor insight into deficits impacting ability to complete ADLs and ADL transfers independently. D/t balance and cognitive deficits pt requires close supv-min A while amb w/ FWW and completing ADLs. At this time continue to recommend placement prior to d/c home to maximize independence in ADLs. Should pt and pt's family decline placement, pt would benefit from 24/7 physical assistance and care. Will continue to benefit from OT services while in house.     Plan    Continue current treatment plan.    Discharge recommendations:  Recommend post-acute placement for additional occupational therapy services prior to discharge home.       06/10/20 0910   Cognition    Cognition / Consciousness X   Speech/ Communication Expressive Aphasia   Safety Awareness Impaired   Bed Mobility    Supine to Sit Supervised   Scooting Supervised   Activities of Daily Living   Grooming Minimal Assist;Standing   Upper Body Dressing Supervision   Lower Body Dressing Supervision   Toileting Minimal Assist   Functional Mobility   Sit to Stand Supervised   Bed, Chair, Wheelchair Transfer Supervised   Toilet Transfers Minimal Assist   Short Term Goals   Short Term Goal # 1 pt will demo toilet transfer with SPV   Goal Outcome # 1 Progressing as expected   Short Term Goal # 2 pt will complete 5min standing grooming ADLs at sink with SPV   Goal Outcome # 2 Progressing as expected   Short Term Goal # 3 pt will demo full body dressing with SPV using AE as needed   Goal Outcome # 3 Goal met

## 2020-06-10 NOTE — DISCHARGE INSTRUCTIONS
"Discharge Instructions    FOLLOW UP WITH PRIMARY CARE PHYSICIAN IN 1-2 DAYS TO START A STATIN OR STATIN EQUIVALENT, MONITOR FOR ALLERGIC REACTION.     CONTINUE ASPIRIN 81 MG DAILY, AND ELIQUIS 5 MG EVERY 12 HOURS    ADHERE TO STRICT FALL PRECAUTIONS    NOTE DOSING OF SEROQUEL. IT IS DOSED SCHEDULED AT NIGHT, AND AS NEEDED EVER 8 HOUR DURING THE DAY.     Follow up with Neurology outpatient. Repeat echocardiogram with bubble study if recommended by Neurology    Discharged to home by medical transportation with escort. Discharged via wheelchair, hospital escort: Yes.  Special equipment needed: Walker    Be sure to schedule a follow-up appointment with your primary care doctor or any specialists as instructed.     Discharge Plan:   Diet Plan: Discussed  Activity Level: Discussed  Confirmed Follow up Appointment: Patient to Call and Schedule Appointment  Confirmed Symptoms Management: Discussed  Medication Reconciliation Updated: Yes    I understand that a diet low in cholesterol, fat, and sodium is recommended for good health. Unless I have been given specific instructions below for another diet, I accept this instruction as my diet prescription.   Other diet: Regular, soft and bite sized, with thin liquids; 1:1    Special Instructions:     Stroke/CVA/TIA/Hemorrhagic Ischemia Discharge Instructions  You have had a stroke. Your risk factors have been identified as follows:  Age - Over 55  Gender - Men are at a higher risk than women  Atrial Fibrillation  Artery Disease / Peripheral Vascular Disease   Previous TIAs or \"mini strokes\"  It is important that you reduce your risk factors to avoid another stroke in the future. Here are some general guidelines to follow:  · Eat healthy - avoid food high in fat.  · Get regular exercise.  · Maintain a healthy weight.  · Avoid smoking.  · Avoid alcohol and illegal drug use.  · Take your medications as directed.  For more information regarding risk factors, refer to pages 17-19 in " your Stroke Patient Education Guide. Stroke Education Guide was given to patient and family member.    Warning signs of a stroke include (which can also be found on page 3 of your Stroke Patient Education Guide):  · Sudden numbness of weakness of the face, arm or leg (especially on one side of the body).  · Sudden confusion, trouble speaking or understanding.  · Sudden trouble seeing in one or both eyes.  · Sudden trouble walking, dizziness, loss of balance or coordination.  · Sudden severe headache with no known cause.  It is very important to get treatment quickly when a stroke occurs. If you experience any of the above warning signs, call [phone] immediately.     Some patients who have had a stroke will be going home on a blood thinner medication called Warfarin (Coumadin).  This medication requires very close monitoring and follow up.  This follow up can be provided by either your Primary Care Physician or by [facility]'s Outpatient Anticoagulation Service.  The Outpatient Anticoagulation Service is located at the [facility] ([facility]).  If you do not know when your follow up appointment is scheduled, call [phone] to verify your appointment time.      · Is patient discharged on Warfarin / Coumadin?   No     Depression / Suicide Risk    As you are discharged from this [facility], it is important to learn how to keep safe from harming yourself.    Recognize the warning signs:  · Abrupt changes in personality, positive or negative- including increase in energy   · Giving away possessions  · Change in eating patterns- significant weight changes-  positive or negative  · Change in sleeping patterns- unable to sleep or sleeping all the time   · Unwillingness or inability to communicate  · Depression  · Unusual sadness, discouragement and loneliness  · Talk of wanting to die  · Neglect of personal appearance   · Rebelliousness- reckless  behavior  · Withdrawal from people/activities they love  · Confusion- inability to concentrate     If you or a loved one observes any of these behaviors or has concerns about self-harm, here's what you can do:  · Talk about it- your feelings and reasons for harming yourself  · Remove any means that you might use to hurt yourself (examples: pills, rope, extension cords, firearm)  · Get professional help from the community (Mental Health, Substance Abuse, psychological counseling)  · Do not be alone:Call your Safe Contact- someone whom you trust who will be there for you.  · Call your local CRISIS HOTLINE 791-8061 or 224-895-1582  · Call your local Children's Mobile Crisis Response Team [place] (255) 623-2101 or www.[employer]  · Call the toll free National Suicide Prevention Hotlines   · National Suicide Prevention Lifeline 011-844-BTPA (7793)  · National Hope Line Network 800-SUICIDE (214-7840)

## 2020-06-10 NOTE — THERAPY
"Speech Language Pathology  Daily Treatment     Patient Name: Alistair Tavares  Age:  89 y.o., Sex:  male  Medical Record #: [id number]  Today's Date: 2020     Precautions: Fall Risk, Swallow Precautions (See Comments)  Comments: expressive and receptive aphasia, impulsive, [place]    Assessment    Pt seen today for f/u aphasia tx. Pt's wife, Rekha, was present for duration of today's session. She reported inability to understand the Pt, given noted neologisms, jargon, and mixed paraphasias. Pt with visual deficits, and reported difficulties reading; however, was able to read large, bolded print for single, familiar words (e.g. Pt's name, birthday, today's date). Pt was able to identify  from Fo2 with mod-I. However, Pt was unable to state  despite max cues. Pt was able to state wife's name, and his first name; however, reported frustration when asked to state last name. Pt was able to recite alphabet with mod-I; however, had difficulties comprehending speech tasks for days of the week.    Pt con't to attempt to communicate with this provider throughout the session, and often stated nonsensical statements such as, \"My sweater is dead\" and \"The squirrel is with 50.\" Attempts to repeat Pt's utterances back to him appeared to be minimally successful in improving his ability to discriminate that his output and intent were not the same.     Pt continues to present with severe receptive and expressive language deficits, and con't to recommend 24/7 supervision following discharge. Pt's wife reported that she is available and able to help with all ADL's and to provide 24/7 supervision. Pt will benefit from ongoing skilled SLP services for aggressive aphasia tx.    Plan    Continue current treatment plan.    Discharge recommendations:  Recommend inpatient transitional care services for continued speech therapy services - or - Recommend outpatient or home health transitional care services for continued speech therapy " services (given  supervision from family/wife)           [id number]   Verbal Expression   Vocal Quality Clear   Verbal Output Automatic Moderate (3)   Verbal Output: Phrases Severe (2)   Verbal Output Conversation Profound (1)   Jargon Severe (2)   Perseverations Minimal (4)   Paraphasias Severe (2)   Auditory Comprehension   Identifies Objects Moderate (3)   Identifies Pictures Moderate (3)   Understands Simple, Structured Conversation  Severe (2)   Understands Complex Conversation Profound (1)   Short Term Goals   Short Term Goal # 1 Pt will consume a diet of SB6/TN0 with no s/sx of aspiration and min cues.    Goal Outcome # 1 Progressing as expected   Short Term Goal # 2 Pt will recite automatic speech tasks with 90% accuracy with mod-A across two sessions.   Goal Outcome # 2  Progressing as expected   Short Term Goal # 3 Pt will state personal information (name, phone #, ) with mod-I and less than two errors.   Goal Outcome  # 3 Progressing slower than expected

## 2020-06-10 NOTE — DISCHARGE SUMMARY
Discharge Summary    CHIEF COMPLAINT ON ADMISSION  Chief Complaint   Patient presents with   • ALOC   • Possible Stroke       Reason for Admission  Confusion     Admission Date  6/6/2020    CODE STATUS  Full Code    HPI & HOSPITAL COURSE    89 y.o. male admitted 6/6/2020 with altered mental status, acute expressive aphasia. Has A-Fib (not on anticoagulation), CAD, CVA.       CT head showed head showed hypodensity in the left temporal lobe consistent with infarction and MRI brain confirmed left acute ischemic parietal and temporal infarct.  Wife and patient initially refused to start AC, later agreed.      Echocardiogram showed LVEF ~ 70%, moderate to severe AS.     Acute ischemic infarct  Neurology were consulted. Underlying pA-Fib, had not been on anticoagulation  Continue Eliquis 5 mg BID and ASA 81 mg daily  Follow up with PCP for Statin equivalent and monitor for allergic reaction  Follow up with PCP and Neurology for possible Bubble Study if still needed   Rehab was recommended, patient's wife refused  Home Health will be initiated    Agitation  Continue Seroquel scheduled and prn  Follow up with PCP in next few days     PAF  Continue Eliquis and ASA      Post traumatic urethral stricture  Continue self catheterization  Follow up with PCP             Therefore, he is discharged in fair and stable condition to home with organized home healthcare and close outpatient follow-up.        Discharge Date  6/10/20    FOLLOW UP ITEMS POST DISCHARGE  Follow up with PCP in 1-2 days to start a statin or statin equivalent, and monitor for allergic reaction.  Follow up with Neurology outpatient. Repeat echocardiogram with bubble study if recommended by Neurology.   Continue ASA 82 mg, Eliquis 5 mg BID, fall precautions.   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 *      FOLLOW UP  No future appointments.  [facility]  [address]  [address]  [place] [address]  [phone]          MEDICATIONS ON DISCHARGE     Medication List      START taking these medications      Instructions   apixaban 5mg Tabs  Commonly known as:  ELIQUIS   Take 1 Tab by mouth 2 Times a Day. Indications: Thromboembolism secondary to Atrial Fibrillation  Dose:  5 mg     * QUEtiapine 25 MG Tabs  Commonly known as:  SEROQUEL   Take 1 Tab by mouth every evening.  Dose:  25 mg     * QUEtiapine 25 MG Tabs  Commonly known as:  SEROQUEL   Take 1 Tab by mouth every 8 hours as needed (agitation).  Dose:  25 mg         * This list has 2 medication(s) that are the same as other medications prescribed for you. Read the directions carefully, and ask your doctor or other care provider to review them with you.            CONTINUE taking these medications      Instructions   aspirin EC 81 MG Tbec  Commonly known as:  ECOTRIN   Take 81 mg by mouth every day.  Dose:  81 mg     CALCIUM-MAGNESIUM-ZINC PO   Take 1 Tab by mouth every day. Unknown OTC Strength.  Dose:  1 Tab     finasteride 5 MG Tabs  Commonly known as:  PROSCAR   Take 5 mg by mouth every evening.  Dose:  5 mg     therapeutic multivitamin-minerals Tabs   Take 1 Tab by mouth every day.  Dose:  1 Tab     VITAMIN B-12 PO   Take 1 Dose by mouth every day. Unknown OTC Strength.  Dose:  1 Dose     vitamin D 1000 Unit (25 mcg) Tabs  Commonly known as:  cholecalciferol   Take 1,000 Units by mouth every day.  Dose:  1,000 Units     VITAMIN-B COMPLEX PO   Take 1 Tab by mouth every day. Unknown OTC Strength.  Dose:  1 Tab            Allergies  Allergies   Allergen Reactions   • Bloodless Unspecified     Pt states he is willing to accept blood/blood products as \"a " "last resort\"   • Coufarin [Warfarin] Unspecified     Pts wife states \"He just does not like that drug\".   • Crestor [Rosuvastatin Calcium] Rash and Itching     Rash/itching all over body   • Flomax [Tamsulosin] Unspecified     Pts wife states \"His BP goes really low and he fell down\"   • Heparin Hives, Rash and Itching     Pts wife states \"Rash all over body\".   • Lipitor [Atorvastatin] Rash     Pts wife states \"Rash all over body\".   • Macrobid [Kdc:Red Dye+Yellow Dye+Nitrofurantoin+Brilliant Blue Fcf] Nausea     Pt's wife states \"Makes pt weak and nausea\".   • Statins [Hmg-Coa-R Inhibitors] Rash and Itching     Rash/itching all over body       DIET  Orders Placed This Encounter   Procedures   • Diet Order Regular (1:1 supervision.)     Standing Status:   Standing     Number of Occurrences:   1     Order Specific Question:   Diet:     Answer:   Regular [1]     Comments:   1:1 supervision.     Order Specific Question:   Texture Modifier     Answer:   Level 6 - Soft & Bite Sized (Dysphagia 3)     Order Specific Question:   Liquid level     Answer:   Level 0 - Thin     Comments:   no straws     Order Specific Question:   Miscellaneous modifications:     Answer:   SLP - 1:1 Supervision by Nursing [21]     Order Specific Question:   Miscellaneous modifications:     Answer:   SLP - Deliver to Nursing Station [22]       ACTIVITY  As tolerated. Fall precautions    CONSULTATIONS  Neurology    PROCEDURES  N/A    LABORATORY  Lab Results   Component Value Date    SODIUM 131 (L) 06/10/2020    POTASSIUM 4.1 06/10/2020    CHLORIDE 99 06/10/2020    CO2 24 06/10/2020    GLUCOSE 102 (H) 06/10/2020    BUN 31 (H) 06/10/2020    CREATININE 0.88 06/10/2020    CREATININE 1.0 12/01/2006        Lab Results   Component Value Date    WBC 11.6 (H) 06/10/2020    HEMOGLOBIN 14.1 06/10/2020    HEMATOCRIT 41.9 (L) 06/10/2020    PLATELETCT 233 06/10/2020        Total time of the discharge process exceeds 40 minutes.  "

## 2020-06-18 ENCOUNTER — TELEPHONE (OUTPATIENT)
Dept: CARDIOLOGY | Facility: MEDICAL CENTER | Age: 85
End: 2020-06-18

## 2020-06-24 ENCOUNTER — TELEMEDICINE (OUTPATIENT)
Dept: CARDIOLOGY | Facility: MEDICAL CENTER | Age: 85
End: 2020-06-24
Payer: COMMERCIAL

## 2020-06-24 VITALS — BODY MASS INDEX: 22.62 KG/M2 | HEIGHT: 70 IN | WEIGHT: 158 LBS

## 2020-06-24 DIAGNOSIS — I48.0 PAF (PAROXYSMAL ATRIAL FIBRILLATION) (HCC): ICD-10-CM

## 2020-06-24 DIAGNOSIS — N35.010 POST-TRAUMATIC MALE URETHRAL MEATAL STRICTURE: ICD-10-CM

## 2020-06-24 DIAGNOSIS — I63.512 ACUTE ISCHEMIC LEFT MCA STROKE (HCC): ICD-10-CM

## 2020-06-24 DIAGNOSIS — I35.0 NONRHEUMATIC AORTIC VALVE STENOSIS: ICD-10-CM

## 2020-06-24 DIAGNOSIS — E87.1 HYPONATREMIA: ICD-10-CM

## 2020-06-24 DIAGNOSIS — D64.9 NORMOCYTIC ANEMIA: ICD-10-CM

## 2020-06-24 PROCEDURE — 99214 OFFICE O/P EST MOD 30 MIN: CPT | Mod: 95,CR | Performed by: INTERNAL MEDICINE

## 2020-06-24 ASSESSMENT — ENCOUNTER SYMPTOMS
CLAUDICATION: 0
STRIDOR: 0
SORE THROAT: 0
HEMOPTYSIS: 0
MUSCULOSKELETAL NEGATIVE: 1
LOSS OF CONSCIOUSNESS: 0
EYES NEGATIVE: 1
CARDIOVASCULAR NEGATIVE: 1
RESPIRATORY NEGATIVE: 1
SPUTUM PRODUCTION: 0
WHEEZING: 0
ORTHOPNEA: 0
BRUISES/BLEEDS EASILY: 0
SHORTNESS OF BREATH: 0
GASTROINTESTINAL NEGATIVE: 1
DIZZINESS: 0
CHILLS: 0
PND: 0
FEVER: 0
PALPITATIONS: 0
COUGH: 0
NEUROLOGICAL NEGATIVE: 1
CONSTITUTIONAL NEGATIVE: 1
WEAKNESS: 0

## 2020-06-24 ASSESSMENT — FIBROSIS 4 INDEX: FIB4 SCORE: 2.51

## 2020-06-24 NOTE — PROGRESS NOTES
Chief Complaint   Patient presents with   • Atrial Fibrillation       Subjective:   Alistair Tavares is a 89 y.o. male who presents today as a follow-up for his dementia aortic stenosis and atrial fibrillation.  At our last visit with him we discussed the risk benefits alternatives of oral anticoagulation for his atrial fibrillation and he declined anticoagulation instead saying he wanted to use aspirin.  I informed him the aspirin had not been shown to be beneficial.  Since then he had a CVA.  He is home.  He tried Eliquis for a few days and developed a rash.  He is now off the Eliquis.  Otherwise he is profoundly hard of hearing and again the history is mostly provided by the wife.  His dementia is progressing.  He is not reporting any chest pain.    Past Medical History:   Diagnosis Date   • Angina 2005    episode of atrial fibrillation   • Arrhythmia     History of afib, no longer on medication   • CAD (coronary artery disease)     paroximal atrial tachycardia   • CATARACT     asim iol   • Dental disorder     lower dentures   • Macular degeneration of both eyes    • Pneumonia 2008    Not hospitalized for it   • Renal disorder 8/4/2012    pyelonephritis   • Stroke (HCC) 2015    pt reports no residual weakness   • Unspecified urinary incontinence     dribbles     Past Surgical History:   Procedure Laterality Date   • CYSTOSCOPY  6/13/2016    Procedure: CYSTOSCOPY 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;  Surgeon: Bucky Bustamante M.D.;  Location: SURGERY [facility];  Service:    • CATARACT PHACO WITH IOL  10/28/2009    Performed by PAULA COE at SURGERY SAME DAY [facility] ORS   • CATARACT PHACO WITH IOL  10/14/2009    Performed by PAULA COE at SURGERY SAME DAY [facility] ORS   • OTHER ORTHOPEDIC SURGERY      PATELLA RIGHT     Family History   Problem Relation Age of Onset   • Lung Disease Father         emphysima   • Arthritis Neg Hx      Social History     Socioeconomic History   •  "Marital status:      Spouse name: Not on file   • Number of children: Not on file   • Years of education: Not on file   • Highest education level: Not on file   Occupational History   • Not on file   Social Needs   • Financial resource strain: Not on file   • Food insecurity     Worry: Not on file     Inability: Not on file   • Transportation needs     Medical: Not on file     Non-medical: Not on file   Tobacco Use   • Smoking status: Never Smoker   • Smokeless tobacco: Never Used   Substance and Sexual Activity   • Alcohol use: Yes     Alcohol/week: 2.4 - 3.0 oz     Types: 3 Standard drinks or equivalent, 1 - 2 Cans of beer per week     Frequency: 4 or more times a week     Drinks per session: 1 or 2   • Drug use: No   • Sexual activity: Never     Partners: Female   Lifestyle   • Physical activity     Days per week: Not on file     Minutes per session: Not on file   • Stress: Not on file   Relationships   • Social connections     Talks on phone: Not on file     Gets together: Not on file     Attends Church service: Not on file     Active member of club or organization: Not on file     Attends meetings of clubs or organizations: Not on file     Relationship status: Not on file   • Intimate partner violence     Fear of current or ex partner: Not on file     Emotionally abused: Not on file     Physically abused: Not on file     Forced sexual activity: Not on file   Other Topics Concern   • Not on file   Social History Narrative   • Not on file     Allergies   Allergen Reactions   • Bloodless Unspecified     Pt states he is willing to accept blood/blood products as \"a last resort\"   • Coufarin [Warfarin] Unspecified     Pts wife states \"He just does not like that drug\".   • Crestor [Rosuvastatin Calcium] Rash and Itching     Rash/itching all over body   • Flomax [Tamsulosin] Unspecified     Pts wife states \"His BP goes really low and he fell down\"   • Heparin Hives, Rash and Itching     Pts wife states \"Rash " "all over body\".   • Lipitor [Atorvastatin] Rash     Pts wife states \"Rash all over body\".   • Macrobid [Kdc:Red Dye+Yellow Dye+Nitrofurantoin+Brilliant Blue Fcf] Nausea     Pt's wife states \"Makes pt weak and nausea\".   • Statins [Hmg-Coa-R Inhibitors] Rash and Itching     Rash/itching all over body     Outpatient Encounter Medications as of 6/24/2020   Medication Sig Dispense Refill   • aspirin EC (ECOTRIN) 81 MG Tablet Delayed Response Take 81 mg by mouth every day.     • therapeutic multivitamin-minerals (THERAGRAN-M) Tab Take 1 Tab by mouth every day.     • vitamin D (CHOLECALCIFEROL) 1000 Unit (25 mcg) Tab Take 1,000 Units by mouth every day.     • Cyanocobalamin (VITAMIN B-12 PO) Take 1 Dose by mouth every day. Unknown OTC Strength.     • finasteride (PROSCAR) 5 MG Tab Take 5 mg by mouth every evening.     • CALCIUM-MAGNESIUM-ZINC PO Take 1 Tab by mouth every day. Unknown OTC Strength.     • B Complex Vitamins (VITAMIN-B COMPLEX PO) Take 1 Tab by mouth every day. Unknown OTC Strength.     • apixaban (ELIQUIS) 5mg Tab Take 1 Tab by mouth 2 Times a Day. Indications: Thromboembolism secondary to Atrial Fibrillation (Patient not taking: Reported on 6/24/2020) 60 Tab 0   • QUEtiapine (SEROQUEL) 25 MG Tab Take 1 Tab by mouth every evening. 60 Tab 3   • QUEtiapine (SEROQUEL) 25 MG Tab Take 1 Tab by mouth every 8 hours as needed (agitation). 60 Tab 3     No facility-administered encounter medications on file as of 6/24/2020.      Review of Systems   Constitutional: Negative.  Negative for chills, fever and malaise/fatigue.   HENT: Negative.  Negative for sore throat.    Eyes: Negative.    Respiratory: Negative.  Negative for cough, hemoptysis, sputum production, shortness of breath, wheezing and stridor.    Cardiovascular: Negative.  Negative for chest pain, palpitations, orthopnea, claudication, leg swelling and PND.   Gastrointestinal: Negative.    Genitourinary: Negative.    Musculoskeletal: Negative.    Skin: " "Negative.    Neurological: Negative.  Negative for dizziness, loss of consciousness and weakness.   Endo/Heme/Allergies: Negative.  Does not bruise/bleed easily.   All other systems reviewed and are negative.       Objective:   Ht 1.778 m (5' 10\")   Wt 71.7 kg (158 lb)   BMI 22.67 kg/m²     Physical Exam   Constitutional: He is oriented to person, place, and time. He appears well-developed and well-nourished. No distress.   HENT:   Head: Normocephalic and atraumatic.   Right Ear: External ear normal.   Left Ear: External ear normal.   Nose: Nose normal.   Mouth/Throat: No oropharyngeal exudate.   Eyes: Pupils are equal, round, and reactive to light. Conjunctivae and EOM are normal. Right eye exhibits no discharge. Left eye exhibits no discharge. No scleral icterus.   Neck: Normal range of motion. Neck supple. No JVD present. No tracheal deviation present.   Cardiovascular: Normal rate.   Pulmonary/Chest: Effort normal and breath sounds normal. No stridor. No respiratory distress.   Abdominal: Soft. Bowel sounds are normal.   Musculoskeletal: Normal range of motion.         General: No tenderness, deformity or edema.   Neurological: He is alert and oriented to person, place, and time. No cranial nerve deficit. Coordination normal.   Skin: Skin is warm and dry. No rash noted. He is not diaphoretic. No erythema. No pallor.   Psychiatric: He has a normal mood and affect. His behavior is normal. Judgment and thought content normal.   Nursing note reviewed.      Assessment:     1. Acute ischemic left MCA stroke ([facility])     2. Hyponatremia     3. Nonrheumatic aortic valve stenosis     4. Normocytic anemia     5. PAF (paroxysmal atrial fibrillation) ([facility])     6. 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  I did discuss with him the risk benefits and " alternatives of oral anticoagulation.  They do not want have another stroke.  They again declined anticoagulation but said they be to use aspirin.  I informed him that they were using aspirin performed which did not work to prevent a CVA.  She then wants to not try Eliquis.  I did recommend Xarelto.  She wants to know what the side effects are so she cannot use the medication however I informed her that there is a low risk of side effects and any medication and he simply was unlucky with the Eliquis.  We will try a low-dose of Xarelto 15 mg/day.  In terms of his aortic stenosis continues to be a poor candidate for an intervention.      AS    - poor candidate for intervnetion    Atrial Fibrillation    - start dose Xarelto 15 mg daily    CVA    - clinical follow up    Dementia    - clinical follow up    Start time: 3:00  Stop time: 3:21    This encounter was conducted via Zoom .   Verbal consent was obtained. Patient's identity was verified.

## 2020-07-15 ENCOUNTER — OFFICE VISIT (OUTPATIENT)
Dept: NEUROLOGY | Facility: MEDICAL CENTER | Age: 85
End: 2020-07-15
Payer: COMMERCIAL

## 2020-07-15 VITALS
DIASTOLIC BLOOD PRESSURE: 68 MMHG | BODY MASS INDEX: 20.96 KG/M2 | HEIGHT: 70 IN | OXYGEN SATURATION: 96 % | WEIGHT: 146.39 LBS | HEART RATE: 86 BPM | SYSTOLIC BLOOD PRESSURE: 116 MMHG

## 2020-07-15 DIAGNOSIS — I63.512 ACUTE ISCHEMIC LEFT MCA STROKE (HCC): ICD-10-CM

## 2020-07-15 DIAGNOSIS — I48.0 PAF (PAROXYSMAL ATRIAL FIBRILLATION) (HCC): ICD-10-CM

## 2020-07-15 PROCEDURE — 99214 OFFICE O/P EST MOD 30 MIN: CPT | Performed by: NURSE PRACTITIONER

## 2020-07-15 ASSESSMENT — ENCOUNTER SYMPTOMS
HEADACHES: 0
PALPITATIONS: 0
NAUSEA: 0
CONSTIPATION: 0
SHORTNESS OF BREATH: 0
NERVOUS/ANXIOUS: 0
COUGH: 0
DOUBLE VISION: 0
MYALGIAS: 1
BLURRED VISION: 0
BRUISES/BLEEDS EASILY: 0
VOMITING: 0
DEPRESSION: 0
HEARTBURN: 0
CHILLS: 0
DIZZINESS: 0
FEVER: 0

## 2020-07-15 ASSESSMENT — FIBROSIS 4 INDEX: FIB4 SCORE: 2.51

## 2020-07-15 NOTE — PATIENT INSTRUCTIONS
For stroke prevention:    1.   Biggest risk factor---atrial fibrillation, treatment is with blood thinners (warfarin, Xarelto or eliquis)    2.  LDL (bad cholesterol) goal < 70, current 83  Exercise at least 30 minutes daily, avoid red meat, fried foods, butter, cheese.   Eat 5-6 servings of vegetables and fruits daily, choose lean white meat without skin (chicken, turkey, white fish)--baked, broiled or grilled.    3.  Blood pressure goal < 130/80            Stroke Prevention  Some medical conditions and lifestyle choices can lead to a higher risk for a stroke. You can help to prevent a stroke by making nutrition, lifestyle, and other changes.  What nutrition changes can be made?    · Eat healthy foods.  ? Choose foods that are high in fiber. These include:  § Fresh fruits.  § Fresh vegetables.  § Whole grains.  ? Eat at least 5 or more servings of fruits and vegetables each day. Try to fill half of your plate at each meal with fruits and vegetables.  ? Choose lean protein foods. These include:  § Lowfat (lean) cuts of meat.  § Chicken without skin.  § Fish.  § Tofu.  § Beans.  § Nuts.  ? Eat low-fat dairy products.  ? Avoid foods that:  § Are high in salt (sodium).  § Have saturated fat.  § Have trans fat.  § Have cholesterol.  § Are processed.  § Are premade.  · Follow eating guidelines as told by your doctor. These may include:  ? Reducing how many calories you eat and drink each day.  ? Limiting how much salt you eat or drink each day to 1,500 milligrams (mg).  ? Using only healthy fats for cooking. These include:  § Olive oil.  § Canola oil.  § Sunflower oil.  ? Counting how many carbohydrates you eat and drink each day.  What lifestyle changes can be made?  · Try to stay at a healthy weight. Talk to your doctor about what a good weight is for you.  · Get at least 30 minutes of moderate physical activity at least 5 days a week. This can include:  ? Fast walking.  ? Biking.  ? Swimming.  · Do not use any  products that have nicotine or tobacco. This includes cigarettes and e-cigarettes. If you need help quitting, ask your doctor. Avoid being around tobacco smoke in general.  · Limit how much alcohol you drink to no more than 1 drink a day for nonpregnant women and 2 drinks a day for men. One drink equals 12 oz of beer, 5 oz of wine, or 1½ oz of hard liquor.  · Do not use drugs.  · Avoid taking birth control pills. Talk to your doctor about the risks of taking birth control pills if:  ? You are over 35 years old.  ? You smoke.  ? You get migraines.  ? You have had a blood clot.  What other changes can be made?  · Manage your cholesterol.  ? It is important to eat a healthy diet.  ? If your cholesterol cannot be managed through your diet, you may also need to take medicines. Take medicines as told by your doctor.  · Manage your diabetes.  ? It is important to eat a healthy diet and to exercise regularly.  ? If your blood sugar cannot be managed through diet and exercise, you may need to take medicines. Take medicines as told by your doctor.  · Control your high blood pressure (hypertension).  ? Try to keep your blood pressure below 130/80. This can help lower your risk of stroke.  ? It is important to eat a healthy diet and to exercise regularly.  ? If your blood pressure cannot be managed through diet and exercise, you may need to take medicines. Take medicines as told by your doctor.  ? Ask your doctor if you should check your blood pressure at home.  ? Have your blood pressure checked every year. Do this even if your blood pressure is normal.  · Talk to your doctor about getting checked for a sleep disorder. Signs of this can include:  ? Snoring a lot.  ? Feeling very tired.  · Take over-the-counter and prescription medicines only as told by your doctor. These may include aspirin or blood thinners (antiplatelets or anticoagulants).  · Make sure that any other medical conditions you have are managed.  Where to find  "more information  · American Stroke Association: www.strokeassociation.org  · National Stroke Association: www.stroke.org  Get help right away if:  · You have any symptoms of stroke. \"BE FAST\" is an easy way to remember the main warning signs:  ? B - Balance. Signs are dizziness, sudden trouble walking, or loss of balance.  ? E - Eyes. Signs are trouble seeing or a sudden change in how you see.  ? F - Face. Signs are sudden weakness or loss of feeling of the face, or the face or eyelid drooping on one side.  ? A - Arms. Signs are weakness or loss of feeling in an arm. This happens suddenly and usually on one side of the body.  ? S - Speech. Signs are sudden trouble speaking, slurred speech, or trouble understanding what people say.  ? T - Time. Time to call emergency services. Write down what time symptoms started.  · You have other signs of stroke, such as:  ? A sudden, very bad headache with no known cause.  ? Feeling sick to your stomach (nausea).  ? Throwing up (vomiting).  ? Jerky movements you cannot control (seizure).  These symptoms may represent a serious problem that is an emergency. Do not wait to see if the symptoms will go away. Get medical help right away. Call your local emergency services (911 in the [place] Do not drive yourself to the hospital.  Summary  · You can prevent a stroke by eating healthy, exercising, not smoking, drinking less alcohol, and treating other health problems, such as diabetes, high blood pressure, or high cholesterol.  · Do not use any products that contain nicotine or tobacco, such as cigarettes and e-cigarettes.  · Get help right away if you have any signs or symptoms of a stroke.  This information is not intended to replace advice given to you by your health care provider. Make sure you discuss any questions you have with your health care provider.  Document Released: 06/18/2013 Document Revised: 02/13/2020 Document Reviewed: 03/21/2018  Elsevier Patient Education © 2020 " [employer]

## 2020-07-15 NOTE — PROGRESS NOTES
Subjective:   REYNA Tavares is a 89 y.o. male who presents to [facility] for L MCA stroke.    He presented to [facility] on on 6/6/2020 with AMS and expressive aphasia.  He was out of window for intervention.      PMH includes Afib (was not on AC), CAD and previous stroke.    He was discharged on Eliquis for Afib but developed a rash, he saw cardiology  who started Xarelto 15mg.    Patient is here with his wife today, he dresses and bathes himself at home.  His wife cooks for him. He is tolerating Xarelto.   She also reports that he is still struggling with words.        Review of Systems   Constitutional: Negative for chills and fever.   HENT: Positive for hearing loss.    Eyes: Negative for blurred vision and double vision.   Respiratory: Negative for cough and shortness of breath.    Cardiovascular: Negative for chest pain and palpitations.   Gastrointestinal: Negative for constipation, heartburn, nausea and vomiting.   Genitourinary: Negative for dysuria.   Musculoskeletal: Positive for myalgias.   Skin: Negative for rash.   Neurological: Negative for dizziness and headaches.   Endo/Heme/Allergies: Does not bruise/bleed easily.   Psychiatric/Behavioral: Negative for depression. The patient is not nervous/anxious.        Past Medical History:   Diagnosis Date   • Angina 2005    episode of atrial fibrillation   • Arrhythmia     History of afib, no longer on medication   • CAD (coronary artery disease)     paroximal atrial tachycardia   • CATARACT     asim iol   • Dental disorder     lower dentures   • Macular degeneration of both eyes    • Pneumonia 2008    Not hospitalized for it   • Renal disorder 8/4/2012    pyelonephritis   • Stroke (HCC) 2015    pt reports no residual weakness   • Unspecified urinary incontinence     dribbles     Current Outpatient Medications on File Prior to Visit   Medication Sig Dispense Refill   • rivaroxaban (XARELTO) 15 MG Tab tablet Take 1 Tab  by mouth with dinner. 30 Tab 11   • aspirin EC (ECOTRIN) 81 MG Tablet Delayed Response Take 81 mg by mouth every day.     • therapeutic multivitamin-minerals (THERAGRAN-M) Tab Take 1 Tab by mouth every day.     • vitamin D (CHOLECALCIFEROL) 1000 Unit (25 mcg) Tab Take 1,000 Units by mouth every day.     • Cyanocobalamin (VITAMIN B-12 PO) Take 1 Dose by mouth every day. Unknown OTC Strength.     • finasteride (PROSCAR) 5 MG Tab Take 5 mg by mouth every evening.     • CALCIUM-MAGNESIUM-ZINC PO Take 1 Tab by mouth every day. Unknown OTC Strength.     • B Complex Vitamins (VITAMIN-B COMPLEX PO) Take 1 Tab by mouth every day. Unknown OTC Strength.       No current facility-administered medications on file prior to visit.           Objective:   I have personally reviewed MRI brain and CTA head and neck and agree with findings below:    1. Small area of acute infarct in the left temporal and parietal lobes.  2.  Chronic infarcts in the right frontal lobes.  3.  Moderate chronic microvascular ischemic disease.  4.  Moderate cerebral volume loss.  CTA head limited by motion but grossly normal  CTA neck:  1.  Calcific atherosclerosis is noted in the aorta carotid arteries and branch vessels of the aorta with some interval increase compared to the prior exam.     2.  There is 50% diameter reduction narrowing at the origin of the right internal carotid artery due to presence of calcified atherosclerotic plaque in the proximal ICA which extends superiorly from the right carotid bulb. Narrowing at this location is   increased compared to the prior exam.     3.  No evidence of significant stenosis in the left ICA. Diameter reduction between 0% and 49%.     4.  No vertebral artery stenosis or occlusion is identified  Stroke labs:  Cr 0.88, LDL 83, A1C 5.5  TTE:  LVEF 70%, atrial fibrillation, LA size WNL, JAIRO 10.  Moderate aortic stenosis.      Encounter Vitals  Standard Vitals  Vitals  Blood Pressure : 116/68  Pulse: 86  Pulse  "Oximetry: 96 %  Height: 177.8 cm (5' 10\")  Weight: 66.4 kg (146 lb 6.2 oz)  Encounter Vitals  Blood Pressure : 116/68  Pulse: 86  Pulse Oximetry: 96 %  Weight: 66.4 kg (146 lb 6.2 oz)  Height: 177.8 cm (5' 10\")  BMI (Calculated): 21            Physical Exam    General:  Alert, no apparent distress,  Psych:   mood and affect WNL  Neuro:     Disoriented to month, age and place naming and memory intact,  It is difficult to tell if speech is fluent, he is extremely hard of hearing and doesn't speak much.                 cranial nerves II-XII intact, decreased hearing bilaterally                 Strength 5/5 BUE/BLE, no drift                  Sensation to PP equal bilaterally                 No limb ataxia with finger to nose and heel to shin                 Ambulates with steady gait.                Biceps,brachioradialis, tricep, patellar and ankle reflex all 2+    Cardiovascular:    S1S2, no abnormal rhythm auscultated, no peripheral edema  Neck:                     No carotid bruits noted   Pulmonary:            Respirations easy, lungs clear to auscultation all fields.    Skin:                     No obvious rashes.        Current NIHSS    1a. LOC:  0  1b. LOC Questions: 2  1c. LOC Commands: 0  2. Best Gaze:0  3. Visual Fields: 0  4. Facial Paresis: 0  5a. Motor arm left: 0  5b. Motor arm right: 0  6a. Motor leg left: 0  6b. Motor leg right: 0  7. Sensory: 0  8. Best Language: 0  9. Limb Ataxia: 0  10. Dysarthria: 0  11. Extinction/Inattention: 0    Total Score Current2      Current mRS1         Assessment/Plan:       1. Acute ischemic left MCA stroke (HCC).    Presumed mechanism by TOAST:  __Large Artery Atherosclerosis  __Small Vessel (Lacunar)  _XXCardioembolic  Secondary to Atrial fibrillation  __Other (Sickle Cell, Vasculitis, Hypercoagulable)  __Unknown  __ESUS    LDL goal < 70, current 83, is intolerant to statins.  Exercise at least 30 minutes daily, avoid red meat, fried foods, butter, cheese.   Eat 5-6 " servings of vegetables and fruits daily, choose lean white meat without skin (chicken, turkey, white fish)--baked, broiled or grilled.      From neurology stand-point, this is no added benefit of Xarelto and ASA, she will check with cardiology to see if he needs ASA too.    2. PAF (paroxysmal atrial fibrillation) (HCC)    Continue Xarelto 15mg per cardiology (Creat Clear 50.7).  CHADS-VAS 5 (declined warfarin in the past)  Per cardiology, discussed signs of bleeding.    Follow up PRN     I counseled patient on stroke risk factors, stroke symptoms, ER precautions and modification of risk factors      Addendum:  Per Dr. Mckeon, OK to stop Aspirin, notified wife.

## 2020-09-24 ENCOUNTER — TELEMEDICINE (OUTPATIENT)
Dept: CARDIOLOGY | Facility: MEDICAL CENTER | Age: 85
End: 2020-09-24
Payer: COMMERCIAL

## 2020-09-24 VITALS
BODY MASS INDEX: 21 KG/M2 | SYSTOLIC BLOOD PRESSURE: 101 MMHG | HEIGHT: 70 IN | HEART RATE: 76 BPM | DIASTOLIC BLOOD PRESSURE: 68 MMHG

## 2020-09-24 DIAGNOSIS — D64.9 NORMOCYTIC ANEMIA: ICD-10-CM

## 2020-09-24 DIAGNOSIS — I35.0 NONRHEUMATIC AORTIC VALVE STENOSIS: ICD-10-CM

## 2020-09-24 DIAGNOSIS — I63.512 ACUTE ISCHEMIC LEFT MCA STROKE (HCC): ICD-10-CM

## 2020-09-24 DIAGNOSIS — I48.0 PAF (PAROXYSMAL ATRIAL FIBRILLATION) (HCC): ICD-10-CM

## 2020-09-24 DIAGNOSIS — Z86.73 HISTORY OF STROKE: ICD-10-CM

## 2020-09-24 PROCEDURE — 99214 OFFICE O/P EST MOD 30 MIN: CPT | Mod: 95,CR | Performed by: INTERNAL MEDICINE

## 2020-09-24 RX ORDER — PAROXETINE 10 MG/1
10 TABLET, FILM COATED ORAL
COMMUNITY
Start: 2020-07-17 | End: 2021-04-30

## 2020-09-24 ASSESSMENT — ENCOUNTER SYMPTOMS: GASTROINTESTINAL NEGATIVE: 1

## 2020-09-24 NOTE — PROGRESS NOTES
No chief complaint on file.      Subjective:   Alistair Tavares is a 89 y.o. male who presents today as a follow-up for his aortic stenosis atrial fibrillation CVA and high risk medication use.  Since he was last seen he continues on Eliquis.  He is have no further CVAs.  Blood pressure is controlled.  He is asymptomatic from his aortic stenosis but relatively bedbound.  He is having no blood pressure issues or lower extremity edema.    Past Medical History:   Diagnosis Date   • Angina 2005    episode of atrial fibrillation   • Arrhythmia     History of afib, no longer on medication   • CAD (coronary artery disease)     paroximal atrial tachycardia   • CATARACT     asim iol   • Dental disorder     lower dentures   • Macular degeneration of both eyes    • Pneumonia 2008    Not hospitalized for it   • Renal disorder 8/4/2012    pyelonephritis   • Stroke (HCC) 2015    pt reports no residual weakness   • Unspecified urinary incontinence     dribbles     Past Surgical History:   Procedure Laterality Date   • CYSTOSCOPY  6/13/2016    Procedure: CYSTOSCOPY 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;  Surgeon: Bucky Bustamante M.D.;  Location: SURGERY [facility];  Service:    • CATARACT PHACO WITH IOL  10/28/2009    Performed by PAULA COE at SURGERY SAME DAY [facility] ORS   • CATARACT PHACO WITH IOL  10/14/2009    Performed by PAULA COE at SURGERY SAME DAY [facility] ORS   • OTHER ORTHOPEDIC SURGERY      PATELLA RIGHT     Family History   Problem Relation Age of Onset   • Lung Disease Father         emphysima   • Arthritis Neg Hx      Social History     Socioeconomic History   • Marital status:      Spouse name: Not on file   • Number of children: Not on file   • Years of education: Not on file   • Highest education level: Not on file   Occupational History   • Not on file   Social Needs   • Financial resource strain: Not on file   • Food insecurity     Worry: Not on file     Inability:  "Not on file   • Transportation needs     Medical: Not on file     Non-medical: Not on file   Tobacco Use   • Smoking status: Never Smoker   • Smokeless tobacco: Never Used   Substance and Sexual Activity   • Alcohol use: Yes     Alcohol/week: 2.4 - 3.0 oz     Types: 3 Standard drinks or equivalent, 1 - 2 Cans of beer per week     Frequency: 4 or more times a week     Drinks per session: 1 or 2   • Drug use: No   • Sexual activity: Never     Partners: Female   Lifestyle   • Physical activity     Days per week: Not on file     Minutes per session: Not on file   • Stress: Not on file   Relationships   • Social connections     Talks on phone: Not on file     Gets together: Not on file     Attends Christianity service: Not on file     Active member of club or organization: Not on file     Attends meetings of clubs or organizations: Not on file     Relationship status: Not on file   • Intimate partner violence     Fear of current or ex partner: Not on file     Emotionally abused: Not on file     Physically abused: Not on file     Forced sexual activity: Not on file   Other Topics Concern   • Not on file   Social History Narrative   • Not on file     Allergies   Allergen Reactions   • Bloodless Unspecified     Pt states he is willing to accept blood/blood products as \"a last resort\"   • Coufarin [Warfarin] Unspecified     Pts wife states \"He just does not like that drug\".   • Crestor [Rosuvastatin Calcium] Rash and Itching     Rash/itching all over body   • Eliquis [Apixaban]      Rash     • Emcin Clear    • Flomax [Tamsulosin] Unspecified     Pts wife states \"His BP goes really low and he fell down\"   • Heparin Hives, Rash and Itching     Pts wife states \"Rash all over body\".   • Lipitor [Atorvastatin] Rash     Pts wife states \"Rash all over body\".   • Macrobid [Kdc:Red Dye+Yellow Dye+Nitrofurantoin+Brilliant Blue Fcf] Nausea     Pt's wife states \"Makes pt weak and nausea\".   • Nitrofurantoin    • Statins [Hmg-Coa-R " "Inhibitors] Rash and Itching     Rash/itching all over body     Outpatient Encounter Medications as of 9/24/2020   Medication Sig Dispense Refill   • rivaroxaban (XARELTO) 15 MG Tab tablet Take 1 Tab by mouth with dinner. 30 Tab 11   • therapeutic multivitamin-minerals (THERAGRAN-M) Tab Take 1 Tab by mouth every day.     • vitamin D (CHOLECALCIFEROL) 1000 Unit (25 mcg) Tab Take 1,000 Units by mouth every day.     • Cyanocobalamin (VITAMIN B-12 PO) Take 1 Dose by mouth every day. Unknown OTC Strength.     • finasteride (PROSCAR) 5 MG Tab Take 5 mg by mouth every evening.     • CALCIUM-MAGNESIUM-ZINC PO Take 1 Tab by mouth every day. Unknown OTC Strength.     • B Complex Vitamins (VITAMIN-B COMPLEX PO) Take 1 Tab by mouth every day. Unknown OTC Strength.     • PARoxetine (PAXIL) 10 MG Tab Take 10 mg by mouth every day.       No facility-administered encounter medications on file as of 9/24/2020.      Review of Systems   Gastrointestinal: Negative.         Objective:   /68 (BP Location: Left arm, Patient Position: Sitting, BP Cuff Size: Adult)   Pulse 76   Ht 1.778 m (5' 10\")   BMI 21.00 kg/m²     Physical Exam    Assessment:     1. Acute ischemic left MCA stroke (HCC)     2. History of stroke     3. Nonrheumatic aortic valve stenosis     4. Normocytic anemia     5. PAF (paroxysmal atrial fibrillation) (HCC)         Medical Decision Making:  Today's Assessment / Status / Plan:     89-year-old male with paroxysmal atrial fibrillation and stroke and severe aortic stenosis.  At this point he is doing well on the low-dose of Xarelto which we should continue.  For his aortic stenosis we will continue conservative management as he is not a surgical or valve replacement candidate given his frailty and lack ability to recover from the procedure.  We have discussed this numerous times.  Otherwise he is doing well we will see him back in 6 months.    Start time: 2:15  Stop time: 2:30    This evaluation was conducted via " Zoom using secure and encrypted videoconferencing technology. The patient was in a private location in the [facility].    The patient's identity was confirmed and verbal consent was obtained for this virtual visit.

## 2021-01-11 DIAGNOSIS — Z23 NEED FOR VACCINATION: ICD-10-CM

## 2021-04-08 ENCOUNTER — TELEPHONE (OUTPATIENT)
Dept: CARDIOLOGY | Facility: MEDICAL CENTER | Age: 86
End: 2021-04-08

## 2021-04-08 DIAGNOSIS — D64.9 NORMOCYTIC ANEMIA: ICD-10-CM

## 2021-04-08 DIAGNOSIS — I35.0 NONRHEUMATIC AORTIC VALVE STENOSIS: ICD-10-CM

## 2021-04-08 DIAGNOSIS — E87.1 HYPONATREMIA: ICD-10-CM

## 2021-04-08 DIAGNOSIS — I63.512 ACUTE ISCHEMIC LEFT MCA STROKE (HCC): ICD-10-CM

## 2021-04-08 DIAGNOSIS — I48.0 PAF (PAROXYSMAL ATRIAL FIBRILLATION) (HCC): ICD-10-CM

## 2021-04-08 NOTE — TELEPHONE ENCOUNTER
RO    Pts wife Rekha called stating Pt needs a new 90 day prescription of xarelto sent to [facility] on [address].      Thank you

## 2021-04-08 NOTE — TELEPHONE ENCOUNTER
S/W Rekha, [facility] [address] closed. Agreed to 90 day supply until F/U on 5/7/21. Aware it is important to keep follow up

## 2021-04-30 ENCOUNTER — APPOINTMENT (OUTPATIENT)
Dept: RADIOLOGY | Facility: MEDICAL CENTER | Age: 86
DRG: 872 | End: 2021-04-30
Attending: EMERGENCY MEDICINE
Payer: MEDICARE

## 2021-04-30 ENCOUNTER — HOSPITAL ENCOUNTER (INPATIENT)
Facility: MEDICAL CENTER | Age: 86
LOS: 7 days | DRG: 872 | End: 2021-05-07
Attending: EMERGENCY MEDICINE | Admitting: HOSPITALIST
Payer: MEDICARE

## 2021-04-30 DIAGNOSIS — N39.0 ACUTE UTI: ICD-10-CM

## 2021-04-30 DIAGNOSIS — R78.81 BACTEREMIA: ICD-10-CM

## 2021-04-30 DIAGNOSIS — A41.9 SEPSIS, DUE TO UNSPECIFIED ORGANISM, UNSPECIFIED WHETHER ACUTE ORGAN DYSFUNCTION PRESENT (HCC): ICD-10-CM

## 2021-04-30 LAB
ALBUMIN SERPL BCP-MCNC: 3.6 G/DL (ref 3.2–4.9)
ALBUMIN/GLOB SERPL: 1 G/DL
ALP SERPL-CCNC: 114 U/L (ref 30–99)
ALT SERPL-CCNC: 12 U/L (ref 2–50)
ANION GAP SERPL CALC-SCNC: 10 MMOL/L (ref 7–16)
APPEARANCE UR: ABNORMAL
AST SERPL-CCNC: 28 U/L (ref 12–45)
BACTERIA #/AREA URNS HPF: ABNORMAL /HPF
BASOPHILS # BLD AUTO: 0.3 % (ref 0–1.8)
BASOPHILS # BLD: 0.04 K/UL (ref 0–0.12)
BILIRUB SERPL-MCNC: 0.7 MG/DL (ref 0.1–1.5)
BILIRUB UR QL STRIP.AUTO: NEGATIVE
BUN SERPL-MCNC: 18 MG/DL (ref 8–22)
CALCIUM SERPL-MCNC: 8.9 MG/DL (ref 8.5–10.5)
CHLORIDE SERPL-SCNC: 100 MMOL/L (ref 96–112)
CO2 SERPL-SCNC: 20 MMOL/L (ref 20–33)
COLOR UR: YELLOW
CREAT SERPL-MCNC: 0.82 MG/DL (ref 0.5–1.4)
EKG IMPRESSION: NORMAL
EOSINOPHIL # BLD AUTO: 0.05 K/UL (ref 0–0.51)
EOSINOPHIL NFR BLD: 0.3 % (ref 0–6.9)
EPI CELLS #/AREA URNS HPF: NEGATIVE /HPF
ERYTHROCYTE [DISTWIDTH] IN BLOOD BY AUTOMATED COUNT: 47 FL (ref 35.9–50)
FLUAV RNA SPEC QL NAA+PROBE: NEGATIVE
FLUBV RNA SPEC QL NAA+PROBE: NEGATIVE
GLOBULIN SER CALC-MCNC: 3.6 G/DL (ref 1.9–3.5)
GLUCOSE SERPL-MCNC: 112 MG/DL (ref 65–99)
GLUCOSE UR STRIP.AUTO-MCNC: NEGATIVE MG/DL
HCT VFR BLD AUTO: 27.9 % (ref 42–52)
HGB BLD-MCNC: 8.4 G/DL (ref 14–18)
HYALINE CASTS #/AREA URNS LPF: ABNORMAL /LPF
IMM GRANULOCYTES # BLD AUTO: 0.08 K/UL (ref 0–0.11)
IMM GRANULOCYTES NFR BLD AUTO: 0.5 % (ref 0–0.9)
KETONES UR STRIP.AUTO-MCNC: NEGATIVE MG/DL
LACTATE BLD-SCNC: 1.8 MMOL/L (ref 0.5–2)
LACTATE BLD-SCNC: 2.2 MMOL/L (ref 0.5–2)
LACTATE BLD-SCNC: 3.1 MMOL/L (ref 0.5–2)
LEUKOCYTE ESTERASE UR QL STRIP.AUTO: ABNORMAL
LYMPHOCYTES # BLD AUTO: 0.62 K/UL (ref 1–4.8)
LYMPHOCYTES NFR BLD: 4 % (ref 22–41)
MCH RBC QN AUTO: 23.2 PG (ref 27–33)
MCHC RBC AUTO-ENTMCNC: 30.1 G/DL (ref 33.7–35.3)
MCV RBC AUTO: 77.1 FL (ref 81.4–97.8)
MICRO URNS: ABNORMAL
MONOCYTES # BLD AUTO: 0.89 K/UL (ref 0–0.85)
MONOCYTES NFR BLD AUTO: 5.8 % (ref 0–13.4)
NEUTROPHILS # BLD AUTO: 13.69 K/UL (ref 1.82–7.42)
NEUTROPHILS NFR BLD: 89.1 % (ref 44–72)
NITRITE UR QL STRIP.AUTO: POSITIVE
NRBC # BLD AUTO: 0 K/UL
NRBC BLD-RTO: 0 /100 WBC
PH UR STRIP.AUTO: 6.5 [PH] (ref 5–8)
PLATELET # BLD AUTO: 323 K/UL (ref 164–446)
PMV BLD AUTO: 9.9 FL (ref 9–12.9)
POTASSIUM SERPL-SCNC: 5.1 MMOL/L (ref 3.6–5.5)
PROT SERPL-MCNC: 7.2 G/DL (ref 6–8.2)
PROT UR QL STRIP: NEGATIVE MG/DL
RBC # BLD AUTO: 3.62 M/UL (ref 4.7–6.1)
RBC # URNS HPF: ABNORMAL /HPF
RBC UR QL AUTO: ABNORMAL
SARS-COV-2 RNA RESP QL NAA+PROBE: NOTDETECTED
SODIUM SERPL-SCNC: 130 MMOL/L (ref 135–145)
SP GR UR STRIP.AUTO: 1.01
SPECIMEN SOURCE: NORMAL
TROPONIN T SERPL-MCNC: 23 NG/L (ref 6–19)
UROBILINOGEN UR STRIP.AUTO-MCNC: 0.2 MG/DL
WBC # BLD AUTO: 15.4 K/UL (ref 4.8–10.8)
WBC #/AREA URNS HPF: ABNORMAL /HPF

## 2021-04-30 PROCEDURE — 700111 HCHG RX REV CODE 636 W/ 250 OVERRIDE (IP): Performed by: EMERGENCY MEDICINE

## 2021-04-30 PROCEDURE — 93005 ELECTROCARDIOGRAM TRACING: CPT | Performed by: EMERGENCY MEDICINE

## 2021-04-30 PROCEDURE — 87186 SC STD MICRODIL/AGAR DIL: CPT

## 2021-04-30 PROCEDURE — 87077 CULTURE AEROBIC IDENTIFY: CPT | Mod: 91

## 2021-04-30 PROCEDURE — 0240U HCHG SARS-COV-2 COVID-19 NFCT DS RESP RNA 3 TRGT MIC: CPT

## 2021-04-30 PROCEDURE — 83605 ASSAY OF LACTIC ACID: CPT

## 2021-04-30 PROCEDURE — A9270 NON-COVERED ITEM OR SERVICE: HCPCS | Performed by: EMERGENCY MEDICINE

## 2021-04-30 PROCEDURE — 700105 HCHG RX REV CODE 258: Performed by: EMERGENCY MEDICINE

## 2021-04-30 PROCEDURE — C9803 HOPD COVID-19 SPEC COLLECT: HCPCS | Performed by: EMERGENCY MEDICINE

## 2021-04-30 PROCEDURE — 70450 CT HEAD/BRAIN W/O DYE: CPT

## 2021-04-30 PROCEDURE — 700111 HCHG RX REV CODE 636 W/ 250 OVERRIDE (IP): Performed by: HOSPITALIST

## 2021-04-30 PROCEDURE — 80053 COMPREHEN METABOLIC PANEL: CPT

## 2021-04-30 PROCEDURE — 87086 URINE CULTURE/COLONY COUNT: CPT

## 2021-04-30 PROCEDURE — A9270 NON-COVERED ITEM OR SERVICE: HCPCS | Performed by: HOSPITALIST

## 2021-04-30 PROCEDURE — 99223 1ST HOSP IP/OBS HIGH 75: CPT | Mod: AI | Performed by: HOSPITALIST

## 2021-04-30 PROCEDURE — 84484 ASSAY OF TROPONIN QUANT: CPT

## 2021-04-30 PROCEDURE — 96365 THER/PROPH/DIAG IV INF INIT: CPT

## 2021-04-30 PROCEDURE — 700102 HCHG RX REV CODE 250 W/ 637 OVERRIDE(OP): Performed by: EMERGENCY MEDICINE

## 2021-04-30 PROCEDURE — 770020 HCHG ROOM/CARE - TELE (206)

## 2021-04-30 PROCEDURE — 700102 HCHG RX REV CODE 250 W/ 637 OVERRIDE(OP): Performed by: HOSPITALIST

## 2021-04-30 PROCEDURE — 700105 HCHG RX REV CODE 258: Performed by: HOSPITALIST

## 2021-04-30 PROCEDURE — 36415 COLL VENOUS BLD VENIPUNCTURE: CPT

## 2021-04-30 PROCEDURE — 71045 X-RAY EXAM CHEST 1 VIEW: CPT

## 2021-04-30 PROCEDURE — 99285 EMERGENCY DEPT VISIT HI MDM: CPT

## 2021-04-30 PROCEDURE — 85025 COMPLETE CBC W/AUTO DIFF WBC: CPT

## 2021-04-30 PROCEDURE — 87040 BLOOD CULTURE FOR BACTERIA: CPT | Mod: 91

## 2021-04-30 PROCEDURE — 81001 URINALYSIS AUTO W/SCOPE: CPT

## 2021-04-30 RX ORDER — HEPARIN SODIUM 5000 [USP'U]/ML
5000 INJECTION, SOLUTION INTRAVENOUS; SUBCUTANEOUS EVERY 8 HOURS
Status: DISCONTINUED | OUTPATIENT
Start: 2021-04-30 | End: 2021-04-30

## 2021-04-30 RX ORDER — SODIUM CHLORIDE, SODIUM LACTATE, POTASSIUM CHLORIDE, CALCIUM CHLORIDE 600; 310; 30; 20 MG/100ML; MG/100ML; MG/100ML; MG/100ML
1000 INJECTION, SOLUTION INTRAVENOUS CONTINUOUS
Status: DISCONTINUED | OUTPATIENT
Start: 2021-04-30 | End: 2021-05-02

## 2021-04-30 RX ORDER — ASCORBIC ACID 500 MG
500 TABLET ORAL EVERY MORNING
COMMUNITY

## 2021-04-30 RX ORDER — ONDANSETRON 2 MG/ML
4 INJECTION INTRAMUSCULAR; INTRAVENOUS EVERY 4 HOURS PRN
Status: DISCONTINUED | OUTPATIENT
Start: 2021-04-30 | End: 2021-05-07 | Stop reason: HOSPADM

## 2021-04-30 RX ORDER — OXYCODONE HYDROCHLORIDE 5 MG/1
5 TABLET ORAL
Status: DISCONTINUED | OUTPATIENT
Start: 2021-04-30 | End: 2021-05-07 | Stop reason: HOSPADM

## 2021-04-30 RX ORDER — ONDANSETRON 4 MG/1
4 TABLET, ORALLY DISINTEGRATING ORAL EVERY 4 HOURS PRN
Status: DISCONTINUED | OUTPATIENT
Start: 2021-04-30 | End: 2021-05-07 | Stop reason: HOSPADM

## 2021-04-30 RX ORDER — SODIUM CHLORIDE, SODIUM LACTATE, POTASSIUM CHLORIDE, AND CALCIUM CHLORIDE .6; .31; .03; .02 G/100ML; G/100ML; G/100ML; G/100ML
1000 INJECTION, SOLUTION INTRAVENOUS
Status: COMPLETED | OUTPATIENT
Start: 2021-04-30 | End: 2021-04-30

## 2021-04-30 RX ORDER — OXYCODONE HYDROCHLORIDE 5 MG/1
2.5 TABLET ORAL
Status: DISCONTINUED | OUTPATIENT
Start: 2021-04-30 | End: 2021-05-07 | Stop reason: HOSPADM

## 2021-04-30 RX ORDER — ACETAMINOPHEN 650 MG/1
650 SUPPOSITORY RECTAL ONCE
Status: COMPLETED | OUTPATIENT
Start: 2021-04-30 | End: 2021-04-30

## 2021-04-30 RX ORDER — FINASTERIDE 5 MG/1
5 TABLET, FILM COATED ORAL EVERY MORNING
Status: DISCONTINUED | OUTPATIENT
Start: 2021-05-01 | End: 2021-05-07 | Stop reason: HOSPADM

## 2021-04-30 RX ORDER — ACETAMINOPHEN 325 MG/1
650 TABLET ORAL EVERY 6 HOURS PRN
Status: DISCONTINUED | OUTPATIENT
Start: 2021-04-30 | End: 2021-05-07 | Stop reason: HOSPADM

## 2021-04-30 RX ORDER — HYDROMORPHONE HYDROCHLORIDE 1 MG/ML
0.25 INJECTION, SOLUTION INTRAMUSCULAR; INTRAVENOUS; SUBCUTANEOUS
Status: DISCONTINUED | OUTPATIENT
Start: 2021-04-30 | End: 2021-05-07 | Stop reason: HOSPADM

## 2021-04-30 RX ADMIN — SODIUM CHLORIDE, POTASSIUM CHLORIDE, SODIUM LACTATE AND CALCIUM CHLORIDE 1000 ML: 600; 310; 30; 20 INJECTION, SOLUTION INTRAVENOUS at 15:57

## 2021-04-30 RX ADMIN — ACETAMINOPHEN 650 MG: 650 SUPPOSITORY RECTAL at 10:37

## 2021-04-30 RX ADMIN — SODIUM CHLORIDE, POTASSIUM CHLORIDE, SODIUM LACTATE AND CALCIUM CHLORIDE 1000 ML: 600; 310; 30; 20 INJECTION, SOLUTION INTRAVENOUS at 09:57

## 2021-04-30 RX ADMIN — PIPERACILLIN AND TAZOBACTAM 3.38 G: 3; .375 INJECTION, POWDER, LYOPHILIZED, FOR SOLUTION INTRAVENOUS; PARENTERAL at 15:50

## 2021-04-30 RX ADMIN — PIPERACILLIN AND TAZOBACTAM 3.38 G: 3; .375 INJECTION, POWDER, LYOPHILIZED, FOR SOLUTION INTRAVENOUS; PARENTERAL at 21:28

## 2021-04-30 RX ADMIN — PIPERACILLIN AND TAZOBACTAM 3.38 G: 3; .375 INJECTION, POWDER, LYOPHILIZED, FOR SOLUTION INTRAVENOUS; PARENTERAL at 10:38

## 2021-04-30 RX ADMIN — RIVAROXABAN 15 MG: 15 TABLET, FILM COATED ORAL at 18:37

## 2021-04-30 ASSESSMENT — PATIENT HEALTH QUESTIONNAIRE - PHQ9
1. LITTLE INTEREST OR PLEASURE IN DOING THINGS: NOT AT ALL
2. FEELING DOWN, DEPRESSED, IRRITABLE, OR HOPELESS: NOT AT ALL
SUM OF ALL RESPONSES TO PHQ9 QUESTIONS 1 AND 2: 0

## 2021-04-30 ASSESSMENT — CHA2DS2 SCORE
CHA2DS2 VASC SCORE: 4
SEX: MALE
VASCULAR DISEASE: NO
PRIOR STROKE OR TIA OR THROMBOEMBOLISM: YES
HYPERTENSION: NO
AGE 65 TO 74: NO
DIABETES: NO
AGE 75 OR GREATER: YES
CHF OR LEFT VENTRICULAR DYSFUNCTION: NO

## 2021-04-30 ASSESSMENT — FIBROSIS 4 INDEX
FIB4 SCORE: 2.53
FIB4 SCORE: 2.4

## 2021-04-30 NOTE — ED NOTES
"Charge RN: Tele 8 JAMIL Huerta notified that pt is ready for transport. Per Bradley, floor is aware and Flex RN Zacarias \"is on his way.\"  "

## 2021-04-30 NOTE — ED NOTES
Pt noted to be sitting upright in bed, warm to touch. Reported to be hard of hearing but does not answer questions. Pt with equal weak  hands. No facial droop noted. No movement of legs noted while resting on bed or upon request with loud voice at this time. Pt without outward signs of trauma no reported recent injury or illness. Pt with previous hx of CVA. Normally has leftward lean when walking but not when sitting upright in chair.

## 2021-04-30 NOTE — ASSESSMENT & PLAN NOTE
With some residual speech deficits, consider aspiration as etiology as well, follow-up chest x-ray  PT OT SLP  Pending SNF  Xarelto resume as no invasive procedure planned.

## 2021-04-30 NOTE — ED TRIAGE NOTES
Pt presents to ED via EMS with complaint of fever/ chills that began this am approx breakfast time. Wife states pt has not complained of feeling ill in recent. Denies recent injury. Pt noted to be hard of hearing. Wife states baseline alert and oriented but today for this RN pt only states last name. Purposeful movement of arms and equal  left/ right hands. No facial droop noted. Pt leans to left in bed which is not normal per wife. Pt with hx of UTI and wife states she has to use catheter every pm for pt. No outward signs of trauma noted.

## 2021-04-30 NOTE — H&P
"Hospital Medicine History & Physical Note    Date of Service  4/30/2021    PCP: No primary care provider on file.    CC: Fever    HPI:   This is a 89 y.o. male with complaints of fever started this morning associated with some chills, ER physician discussed with wife patient been doing well up until this morning generally does well at home is typically alert and oriented however unable to provide at this time.  Occasional cough no abdominal pain no diarrhea.  Patient history of stroke last year which has some effect on his speech, he is currently anticoagulated on Xarelto. Per his wife, patient is not a DNR/DNI.     Wife has to cath him daily, suspect urosepsis. Difficult with cathing in the ED but ultimately able to be catheterized and labs sent.    I discussed this patient's case with Dr Lobato of the emergency department.     Consultants:   None    ROS: As above. Unable to obtain history.     PMHx:   has a past medical history of Angina (2005), Arrhythmia, CAD (coronary artery disease), CATARACT, Dental disorder, Macular degeneration of both eyes, Pneumonia (2008), Renal disorder (8/4/2012), Stroke ([facility]) (2015), and Unspecified urinary incontinence. He also has no past medical history of Fall.    PSHx:   has a past surgical history that includes cataract phaco with iol (10/14/2009); cataract phaco with iol (10/28/2009); other orthopedic surgery; and cystoscopy (6/13/2016).    SHx:   reports that he has never smoked. He has never used smokeless tobacco. He reports current alcohol use of about 2.4 - 3.0 oz of alcohol per week. He reports that he does not use drugs.    FHx:  Reviewed with patient, no pertinent family history noted.    Allergies:  Allergies   Allergen Reactions   • Bloodless Unspecified     Pt states he is willing to accept blood/blood products as \"a last resort\"   • Crestor [Rosuvastatin Calcium] Rash and Itching     Rash/itching all over body   • Eliquis [Apixaban] Rash           • Flomax " "[Tamsulosin] Unspecified     Pts wife states \"His BP goes really low and he fell down\"   • Heparin Hives, Rash and Itching     Pts wife states \"Rash all over body\".   • Lipitor [Atorvastatin] Rash     Pts wife states \"Rash all over body\".   • Macrobid [Kdc:Red Dye+Yellow Dye+Nitrofurantoin+Brilliant Blue Fcf] Nausea     Pt's wife states \"Makes pt weak and nausea\".   • Statins [Hmg-Coa-R Inhibitors] Rash and Itching     Rash/itching all over body   • Emcin Clear Unspecified         • Coufarin [Warfarin] Unspecified     Pts wife states \"He just does not like that drug\".       Medications:  No current facility-administered medications on file prior to encounter.     Current Outpatient Medications on File Prior to Encounter   Medication Sig Dispense Refill   • ascorbic acid (VITAMIN C) 500 MG tablet Take 500 mg by mouth every morning.     • VITAMIN A-BETA CAROTENE PO Take 1 tablet by mouth every Monday, Wednesday, and Friday.     • rivaroxaban (XARELTO) 15 MG Tab tablet Take 1 tablet by mouth with dinner. 90 tablet 0   • Cholecalciferol (VITAMIN D) 2000 UNIT Tab Take 2,000 Units by mouth every morning.     • Cyanocobalamin (VITAMIN B-12 PO) Take 1 tablet by mouth every morning. Unknown OTC Strength.     • finasteride (PROSCAR) 5 MG Tab Take 5 mg by mouth every morning.     • CALCIUM-MAGNESIUM-ZINC PO Take 1 tablet by mouth every morning. Unknown OTC Strength.     • B Complex Vitamins (VITAMIN-B COMPLEX PO) Take 1 tablet by mouth every Tuesday and Thursday. Unknown OTC Strength.         Objective Exam:  Vitals:    04/30/21 1101 04/30/21 1301 04/30/21 1400 04/30/21 1517   BP: 124/61 141/86 138/79 121/65   Pulse: 86 78 78 79   Resp: 18 18 18 18   Temp:    36.7 °C (98.1 °F)   TempSrc:    Temporal   SpO2: 94% 95% 95% 97%   Weight:       Height:           Physical Exam   Constitutional: He appears well-developed and well-nourished. No distress.   HENT:   Head: Normocephalic and atraumatic.   Eyes: Conjunctivae are normal. No " scleral icterus.   Pulmonary/Chest: Effort normal. No respiratory distress. He has no wheezes.   Abdominal: Soft. Bowel sounds are normal. He exhibits no distension.   Musculoskeletal:         General: No tenderness or edema.      Cervical back: Neck supple.   Neurological: He is alert. Coordination normal.   Seems confused   Skin: Skin is warm and dry. No erythema.   Psychiatric:   Flat affect   Nursing note and vitals reviewed.      Laboratory--reviewed personally and are as follows:  Lab Results   Component Value Date/Time    WBC 15.4 (H) 04/30/2021 09:15 AM    RBC 3.62 (L) 04/30/2021 09:15 AM    HEMOGLOBIN 8.4 (L) 04/30/2021 09:15 AM    HEMATOCRIT 27.9 (L) 04/30/2021 09:15 AM    MCV 77.1 (L) 04/30/2021 09:15 AM    MCH 23.2 (L) 04/30/2021 09:15 AM    MCHC 30.1 (L) 04/30/2021 09:15 AM    MPV 9.9 04/30/2021 09:15 AM    NEUTSPOLYS 89.10 (H) 04/30/2021 09:15 AM    LYMPHOCYTES 4.00 (L) 04/30/2021 09:15 AM    MONOCYTES 5.80 04/30/2021 09:15 AM    EOSINOPHILS 0.30 04/30/2021 09:15 AM    BASOPHILS 0.30 04/30/2021 09:15 AM    ANISOCYTOSIS 1+ 08/12/2016 03:12 AM      Lab Results   Component Value Date/Time    SODIUM 130 (L) 04/30/2021 09:15 AM    POTASSIUM 5.1 04/30/2021 09:15 AM    CHLORIDE 100 04/30/2021 09:15 AM    CO2 20 04/30/2021 09:15 AM    GLUCOSE 112 (H) 04/30/2021 09:15 AM    BUN 18 04/30/2021 09:15 AM    CREATININE 0.82 04/30/2021 09:15 AM    CREATININE 1.0 12/01/2006 07:15 PM      Lab Results   Component Value Date/Time    PROTHROMBTM 14.8 (H) 06/06/2020 10:05 AM    INR 1.13 06/06/2020 10:05 AM        Radiology  DX-CHEST-PORTABLE (1 VIEW)   Final Result      Left basilar opacity which could represent atelectasis and/or pneumonitis.      Mild interstitial opacities most likely represent chronic changes and/or mild vascular congestion.      CT-HEAD W/O   Final Result         1.  No acute intracranial process.      2. Diffuse atrophy and periventricular white matter changes consistent with chronic small vessel  disease. Encephalomalacia in the right frontal and left temporal lobes          Assessment/Plan:  * Recurrent UTI- (present on admission)  Assessment & Plan  With no fevers this morning  Suspect urinary source  Follow-up UA, continue IV antibiotics  Follow labs    Sepsis (HCC)  Assessment & Plan  This is Sepsis Present on admission  SIRS criteria identified on my evaluation include: Fever, with temperature greater than 101 deg F and Tachycardia, with heart rate greater than 90 BPM  Source is either urinary or lungs  Suspected onset of infection (date and time) a.m. 4/30/2021  Sepsis protocol initiated  Fluid resuscitation ordered per protocol  IV antibiotics as appropriate for source of sepsis  While organ dysfunction may be noted elsewhere in this problem list or in the chart, degree of organ dysfunction does not meet CMS criteria for severe sepsis          PAF (paroxysmal atrial fibrillation) (HCC)- (present on admission)  Assessment & Plan  Rate controlled, continue Xarelto    History of stroke- (present on admission)  Assessment & Plan  With some residual speech deficits, consider aspiration as etiology as well, follow-up chest x-ray  PT OT to be ordered       It is expected patient will stay beyond 2 midnights.    VTE prophylaxis: Xarelto

## 2021-04-30 NOTE — DISCHARGE PLANNING
CM met with pt's wife (Rekha) at bedside to complete CTT assessment. She assists him with whatever ADL's he is unable to do although she states he's pretty good at most ADL's. They live in a two story apartment/townhouse with one set of stairs to get to apartment and a second set of stairs inside the townhouse up to the bedroom. She states he has done well with the stairs and is able to walk a short distance around the park. Neither of them drive and use taxi or neighbors to assist with transportation needs. Medical equipment includes FWW and wheelchair.    Care Transition Team Assessment  (Rekha, wife ~ [phone])    Information Source  Orientation Level: Unable to assess  Information Given By: Spouse  Who is responsible for making decisions for patient? : Patient    Readmission Evaluation  Is this a readmission?: No     Interdisciplinary Discharge Planning  Does Admitting Nurse Feel This Could be a Complex Discharge?: No  Primary Care Physician: Geriatric Specialty  Lives with - Patient's Self Care Capacity: Spouse  Support Systems: Spouse / Significant Other, Friends / Neighbors  Housing / Facility: 2 Story Apartment / Condo  Do You Take your Prescribed Medications Regularly: Yes  Able to Return to Previous ADL's: Future Time w/Therapy  Mobility Issues: Yes  Prior Services: None  Assistance Needed: Unknown at this Time  Durable Medical Equipment: Walker    Discharge Preparedness  What is your plan after discharge?: Uncertain - pending medical team collaboration  What are your discharge supports?: Spouse  Prior Functional Level: Needs Assist with Activities of Daily Living, Needs Assist with Medication Management, Uses Walker, Uses Wheelchair  Difficulity with ADLs: Bathing, Walking  Difficulty with ADLs Comment: (see note)  Difficulity with IADLs: Driving, Keeping track of finances, Laundry, Managing medication, Shopping, Cooking  Difficulity with IADL Comments: spouse    Functional Assesment  Prior  Functional Level: Needs Assist with Activities of Daily Living, Needs Assist with Medication Management, Uses Walker, Uses Wheelchair    Finances  Financial Barriers to Discharge: No  Prescription Coverage: Yes     Advance Directive  Advance Directive?: None     Discharge Risks or Barriers  Discharge risks or barriers?: Transportation    Anticipated Discharge Information  Discharge Disposition: Still a Patient (30)  Discharge Address: [facility] [id number], [place]  Discharge Contact Phone Number: [phone]

## 2021-04-30 NOTE — ED PROVIDER NOTES
ED Provider Note    Scribed for Liliane Lobato M.D. by Jerson Alexis. 4/30/2021  9:46 AM    Primary care provider: No primary care provider noted.  Means of arrival: EMS  History obtained from: EMS and patient's wife  History limited by: nonverbal, AMS     CHIEF COMPLAINT  Chief Complaint   Patient presents with    Fever     onset this am. Chills. muscle aches. nausea.      PPE Note: I personally donned full PPE for all patient encounters during this visit, including wearing an N95 respirator mask, gloves, and eye protection. Scribe remained outside the patient's room and did not have any contact with the patient for the duration of patient encounter.      HPI  Alistair Tavares is a 89 y.o. male who presents to the Emergency Department with concerns for a fever acute onset this morning with associated chills and body aches. Per his wife, the patient has been doing well up until this morning. He is typically alert and oriented, but the patient is currently only oriented to his last name. He is nonverbal at the time of evaluation. He is leaning to his left. He has been fairly nauseous, and he has also had a mild cough. He has not had any diarrhea. Patient requires catheretization at night; his urine appeared fairly normal last night. He is not vaccinated against COVID. He is anticoagulated on Xarelto. He had a stroke in June 2020, and it affected his speech. No recent falls or trauma. Per his wife, patient is not a DNR/DNI.     Further history of present illness cannot be obtained due to the patient's nonverbal, altered mental status.      REVIEW OF SYSTEMS  HEENT:  No ear pain, congestion, or sore throat   EYES: no discharge, redness, or vision changes  CARDIAC: no chest pain, no palpitations    PULMONARY: no dyspnea, cough, or congestion   GI: no vomiting, diarrhea, or abdominal pain   : no dysuria, back pain, or hematuria   Neuro: no weakness, numbness, aphasia, or headache  Musculoskeletal: no swelling,  "deformity, pain, or joint swelling  Endocrine: no fevers, sweating, or weight loss   SKIN: no rash, erythema, or contusions     See history of present illness. Further ROS cannot be obtained due to the patient's nonverbal, AMS.       PAST MEDICAL HISTORY   has a past medical history of Angina (2005), Arrhythmia, CAD (coronary artery disease), CATARACT, Dental disorder, Macular degeneration of both eyes, Pneumonia (2008), Renal disorder (8/4/2012), Stroke (HCC) (2015), and Unspecified urinary incontinence.    SURGICAL HISTORY   has a past surgical history that includes cataract phaco with iol (10/14/2009); cataract phaco with iol (10/28/2009); other orthopedic surgery; and cystoscopy (6/13/2016).    SOCIAL HISTORY  Social History     Tobacco Use    Smoking status: Never Smoker    Smokeless tobacco: Never Used   Substance Use Topics    Alcohol use: Yes     Alcohol/week: 2.4 - 3.0 oz     Types: 3 Standard drinks or equivalent, 1 - 2 Cans of beer per week    Drug use: No      Social History     Substance and Sexual Activity   Drug Use No       FAMILY HISTORY  Family History   Problem Relation Age of Onset    Lung Disease Father         emphysima    Arthritis Neg Hx        CURRENT MEDICATIONS  Current Outpatient Medications   Medication Instructions    B Complex Vitamins (VITAMIN-B COMPLEX PO) 1 tablet, Oral, DAILY, Unknown OTC Strength.    CALCIUM-MAGNESIUM-ZINC PO 1 tablet, Oral, DAILY, Unknown OTC Strength.    Cyanocobalamin (VITAMIN B-12 PO) 1 Dose, Oral, DAILY, Unknown OTC Strength.    finasteride (PROSCAR) 5 mg, Oral, EVERY EVENING    PARoxetine (PAXIL) 10 mg, Oral, EVERY DAY    rivaroxaban (XARELTO) 15 mg, Oral, WITH PM MEAL    therapeutic multivitamin-minerals (THERAGRAN-M) Tab 1 tablet, Oral, DAILY    vitamin D (CHOLECALCIFEROL) 1,000 Units, Oral, DAILY          ALLERGIES  Allergies   Allergen Reactions    Bloodless Unspecified     Pt states he is willing to accept blood/blood products as \"a last resort\"    " "Crestor [Rosuvastatin Calcium] Rash and Itching     Rash/itching all over body    Eliquis [Apixaban] Rash            Flomax [Tamsulosin] Unspecified     Pts wife states \"His BP goes really low and he fell down\"    Heparin Hives, Rash and Itching     Pts wife states \"Rash all over body\".    Lipitor [Atorvastatin] Rash     Pts wife states \"Rash all over body\".    Macrobid [Kdc:Red Dye+Yellow Dye+Nitrofurantoin+Brilliant Blue Fcf] Nausea     Pt's wife states \"Makes pt weak and nausea\".    Statins [Hmg-Coa-R Inhibitors] Rash and Itching     Rash/itching all over body    Emcin Clear Unspecified          Coufarin [Warfarin] Unspecified     Pts wife states \"He just does not like that drug\".       PHYSICAL EXAM  VITAL SIGNS: /59   Pulse 80   Temp 37.3 °C (99.2 °F) (Temporal)   Resp 20   Ht 1.829 m (6')   Wt 65 kg (143 lb 4.8 oz)   SpO2 93%   BMI 19.43 kg/m²     Constitutional: Pale, warm to the touch, nonverbal, shivering.   HEENT: Normocephalic, Atraumatic,  external ears normal, pharynx pink,  Mucous  Membranes moist, No rhinorrhea or mucosal edema  Eyes: PERRL, EOMI, Conjunctiva normal, No discharge.   Neck: Normal range of motion, No tenderness, Supple, No stridor.   Lymphatic: No lymphadenopathy    Cardiovascular: Regular Rate and Rhythm, No murmurs,  rubs, or gallops.   Thorax & Lungs: Lungs clear to auscultation bilaterally, No respiratory distress, No wheezes, rhales or rhonchi, No chest wall tenderness.   Abdomen: Bowel sounds normal, Soft, non tender, non distended,  No pulsatile masses., no rebound guarding or peritoneal signs.   Skin: Warm, Dry, No erythema, No rash,   Back:  No CVA tenderness,  No spinal tenderness, bony crepitance, step offs, or instability.   Neurologic: Alert & oriented x 3, Normal motor function, Normal sensory function, No focal deficits noted. Normal reflexes. Normal Cranial Nerves.  Extremities: Equal, intact distal pulses, No cyanosis, clubbing or edema,  No tenderness. "   Musculoskeletal: Good range of motion in all major joints. No tenderness to palpation or major deformities noted.     DIAGNOSTIC STUDIES / PROCEDURES    LABS  Results for orders placed or performed during the hospital encounter of 04/30/21   LACTIC ACID   Result Value Ref Range    Lactic Acid 3.1 (H) 0.5 - 2.0 mmol/L   CBC WITH DIFFERENTIAL   Result Value Ref Range    WBC 15.4 (H) 4.8 - 10.8 K/uL    RBC 3.62 (L) 4.70 - 6.10 M/uL    Hemoglobin 8.4 (L) 14.0 - 18.0 g/dL    Hematocrit 27.9 (L) 42.0 - 52.0 %    MCV 77.1 (L) 81.4 - 97.8 fL    MCH 23.2 (L) 27.0 - 33.0 pg    MCHC 30.1 (L) 33.7 - 35.3 g/dL    RDW 47.0 35.9 - 50.0 fL    Platelet Count 323 164 - 446 K/uL    MPV 9.9 9.0 - 12.9 fL    Neutrophils-Polys 89.10 (H) 44.00 - 72.00 %    Lymphocytes 4.00 (L) 22.00 - 41.00 %    Monocytes 5.80 0.00 - 13.40 %    Eosinophils 0.30 0.00 - 6.90 %    Basophils 0.30 0.00 - 1.80 %    Immature Granulocytes 0.50 0.00 - 0.90 %    Nucleated RBC 0.00 /100 WBC    Neutrophils (Absolute) 13.69 (H) 1.82 - 7.42 K/uL    Lymphs (Absolute) 0.62 (L) 1.00 - 4.80 K/uL    Monos (Absolute) 0.89 (H) 0.00 - 0.85 K/uL    Eos (Absolute) 0.05 0.00 - 0.51 K/uL    Baso (Absolute) 0.04 0.00 - 0.12 K/uL    Immature Granulocytes (abs) 0.08 0.00 - 0.11 K/uL    NRBC (Absolute) 0.00 K/uL   COMP METABOLIC PANEL   Result Value Ref Range    Sodium 130 (L) 135 - 145 mmol/L    Potassium 5.1 3.6 - 5.5 mmol/L    Chloride 100 96 - 112 mmol/L    Co2 20 20 - 33 mmol/L    Anion Gap 10.0 7.0 - 16.0    Glucose 112 (H) 65 - 99 mg/dL    Bun 18 8 - 22 mg/dL    Creatinine 0.82 0.50 - 1.40 mg/dL    Calcium 8.9 8.5 - 10.5 mg/dL    AST(SGOT) 28 12 - 45 U/L    ALT(SGPT) 12 2 - 50 U/L    Alkaline Phosphatase 114 (H) 30 - 99 U/L    Total Bilirubin 0.7 0.1 - 1.5 mg/dL    Albumin 3.6 3.2 - 4.9 g/dL    Total Protein 7.2 6.0 - 8.2 g/dL    Globulin 3.6 (H) 1.9 - 3.5 g/dL    A-G Ratio 1.0 g/dL   CoV-2 and Flu A/B by PCR (24 hour In-House): Collect NP swab in [facility]    Specimen:  Nasopharyngeal; Respirate   Result Value Ref Range    SARS-CoV-2 Source NP Swab    TROPONIN   Result Value Ref Range    Troponin T 23 (H) 6 - 19 ng/L   ESTIMATED GFR   Result Value Ref Range    GFR If African American >60 >60 mL/min/1.73 m 2    GFR If Non African American >60 >60 mL/min/1.73 m 2   EKG   Result Value Ref Range    Report       [facility] Emergency Dept.    Test Date:  2021  Pt Name:    JEREL GROSS                Department: ER  MRN:        [id number]                      Room:       [facility]  Gender:     Male                         Technician: [id number]  :        [id number]                   Requested By:TRAVIS BLOOM  Order #:    [id number]                    Reading MD: TRAVIS BLOOM MD    Measurements  Intervals                                Axis  Rate:       81                           P:  ME:                                      QRS:        -28  QRSD:       86                           T:          87  QT:         360  QTc:        418    Interpretive Statements  ATRIAL FIBRILLATION  BORDERLINE LEFT AXIS DEVIATION  BORDERLINE LOW VOLTAGE IN FRONTAL LEADS  EARLY PRECORDIAL R/S TRANSITION  BORDERLINE REPOLARIZATION ABNORMALITY  Compared to ECG 2020 10:48:05  No significant changes  Electronically Signed On 2021 12:25:25 PDT by TRAVIS BLOOM MD        All labs reviewed by me.    EKG  12 lead EKG; Interpreted by emergency department physician as noted above    RADIOLOGY  DX-CHEST-PORTABLE (1 VIEW)   Final Result      Left basilar opacity which could represent atelectasis and/or pneumonitis.      Mild interstitial opacities most likely represent chronic changes and/or mild vascular congestion.      CT-HEAD W/O   Final Result         1.  No acute intracranial process.      2. Diffuse atrophy and periventricular white matter changes consistent with chronic small vessel disease. Encephalomalacia in the right frontal and left temporal lobes        The radiologist's  interpretation of all radiological studies have been reviewed by me.    COURSE & MEDICAL DECISION MAKING  Nursing notes, VS, PMSFHx reviewed in chart.    9:46 AM Patient seen and examined at bedside. Patient will be treated with an LR bolus, Tylenol 650 mg, and Zosyn IVPB. Intravenous fluids administered for dehydration. Ordered CT-head w/o, CXR, CBC w/ diff, CMP, UA, urine culture, blood cultures, lactic acid, troponin, COVID testing, and an EKG to evaluate his symptoms. The differential diagnoses include but are not limited to: sepsis from UTI or pneumonia. CT imaging obtained given that the patient appears altered and is leaning slightly to his left.     12:25 PM - Patient appears notably improved from his initial presentation, and he states that he is feeling better. Patient is talking and sitting up now. We discussed the patient's diagnostic studies as noted above and the plan for hospitalization to treat with IV antibiotics. Patient and his wife agree with this plan.    12:48 PM - I discussed the patient's case and the above findings with Dr. Anderson (Hospitalist) who agrees to evaluate the patient for hospitalization.      HYDRATION: Based on the patient's presentation of Sepsis the patient was given IV fluids. IV Hydration was used because oral hydration was not adequate alone. Upon recheck following hydration, the patient was improved.    DISPOSITION:  Patient will be hospitalized by Dr. Anderson in guarded condition.     FINAL IMPRESSION  1. Sepsis, due to unspecified organism, unspecified whether acute organ dysfunction present (HCC)    2. Acute UTI          Jerson BURK (Scribclaude), am scribing for, and in the presence of, Liliane Lobato M.D..    Electronically signed by: Jerson Alexis (Scribe), 4/30/2021    Liliane BURK M.D. personally performed the services described in this documentation, as scribed by Jerson Alexis in my presence, and it is both accurate and complete.    The note accurately  reflects work and decisions made by me.  Liliane Lobato M.D.  4/30/2021  1:28 PM

## 2021-04-30 NOTE — ED NOTES
Pt semi reclined in bed, moving with stimulation. Pt had been noted to be incontinent urine. Linens and gown changed. Noted to be not leaning to left as he had been upon arrival to ED. Wife remains at bedside.

## 2021-04-30 NOTE — ED NOTES
Med rec completed per Pt's spouse at bedside.  Pt's spouse unsure of the strengths of some of Pt's vitamins/supplements.  Allergies reviewed with Pt's spouse.  No oral antibiotics in last 14 days.

## 2021-04-30 NOTE — DISCHARGE PLANNING
Pt recently switched to [facility] (Homero Osullivan) and has used Nati BOWEN in the past.

## 2021-04-30 NOTE — ASSESSMENT & PLAN NOTE
This is Sepsis Present on admission  SIRS criteria identified on my evaluation include: Fever, with temperature greater than 101 deg F and Tachycardia, with heart rate greater than 90 BPM  Source is either urinary or lungs  Suspected onset of infection (date and time) a.m. 4/30/2021  Sepsis protocol initiated  Fluid resuscitation ordered per protocol  IV antibiotics as appropriate for source of sepsis  While organ dysfunction may be noted elsewhere in this problem list or in the chart, degree of organ dysfunction does not meet CMS criteria for severe sepsis    Resolved

## 2021-04-30 NOTE — PROGRESS NOTES
1534 Arrived to floor from ER via travis rick/ Zacarias RN. Awake, oriented x2. Not in any distress, O2 2L per NC sats >94%. Wife @ bedside. Oriented pt and wife to bed , room and call system.

## 2021-05-01 ENCOUNTER — APPOINTMENT (OUTPATIENT)
Dept: RADIOLOGY | Facility: MEDICAL CENTER | Age: 86
DRG: 872 | End: 2021-05-01
Attending: FAMILY MEDICINE
Payer: MEDICARE

## 2021-05-01 PROBLEM — D50.9 MICROCYTIC ANEMIA: Status: ACTIVE | Noted: 2021-05-01

## 2021-05-01 PROBLEM — E87.20 LACTIC ACIDOSIS: Status: ACTIVE | Noted: 2021-05-01

## 2021-05-01 LAB
ALBUMIN SERPL BCP-MCNC: 3.3 G/DL (ref 3.2–4.9)
ALBUMIN/GLOB SERPL: 1.1 G/DL
ALP SERPL-CCNC: 133 U/L (ref 30–99)
ALT SERPL-CCNC: 28 U/L (ref 2–50)
ANION GAP SERPL CALC-SCNC: 7 MMOL/L (ref 7–16)
AST SERPL-CCNC: 51 U/L (ref 12–45)
BACTERIA BLD CULT: ABNORMAL
BACTERIA BLD CULT: ABNORMAL
BASOPHILS # BLD AUTO: 0.4 % (ref 0–1.8)
BASOPHILS # BLD: 0.07 K/UL (ref 0–0.12)
BILIRUB SERPL-MCNC: 1 MG/DL (ref 0.1–1.5)
BUN SERPL-MCNC: 17 MG/DL (ref 8–22)
CALCIUM SERPL-MCNC: 8.7 MG/DL (ref 8.5–10.5)
CHLORIDE SERPL-SCNC: 102 MMOL/L (ref 96–112)
CO2 SERPL-SCNC: 25 MMOL/L (ref 20–33)
CREAT SERPL-MCNC: 0.93 MG/DL (ref 0.5–1.4)
EOSINOPHIL # BLD AUTO: 0.03 K/UL (ref 0–0.51)
EOSINOPHIL NFR BLD: 0.2 % (ref 0–6.9)
ERYTHROCYTE [DISTWIDTH] IN BLOOD BY AUTOMATED COUNT: 45.5 FL (ref 35.9–50)
FERRITIN SERPL-MCNC: 23.7 NG/ML (ref 22–322)
GLOBULIN SER CALC-MCNC: 3.1 G/DL (ref 1.9–3.5)
GLUCOSE SERPL-MCNC: 100 MG/DL (ref 65–99)
HCT VFR BLD AUTO: 24.3 % (ref 42–52)
HGB BLD-MCNC: 7.6 G/DL (ref 14–18)
HGB RETIC QN AUTO: 20.8 PG/CELL (ref 29–35)
IMM GRANULOCYTES # BLD AUTO: 0.1 K/UL (ref 0–0.11)
IMM GRANULOCYTES NFR BLD AUTO: 0.6 % (ref 0–0.9)
IMM RETICS NFR: 17.8 % (ref 9.3–17.4)
IRON SATN MFR SERPL: 6 % (ref 15–55)
IRON SERPL-MCNC: 15 UG/DL (ref 50–180)
LYMPHOCYTES # BLD AUTO: 0.93 K/UL (ref 1–4.8)
LYMPHOCYTES NFR BLD: 5.3 % (ref 22–41)
MCH RBC QN AUTO: 23.8 PG (ref 27–33)
MCHC RBC AUTO-ENTMCNC: 31.3 G/DL (ref 33.7–35.3)
MCV RBC AUTO: 76.2 FL (ref 81.4–97.8)
MONOCYTES # BLD AUTO: 1.62 K/UL (ref 0–0.85)
MONOCYTES NFR BLD AUTO: 9.2 % (ref 0–13.4)
NEUTROPHILS # BLD AUTO: 14.86 K/UL (ref 1.82–7.42)
NEUTROPHILS NFR BLD: 84.3 % (ref 44–72)
NRBC # BLD AUTO: 0 K/UL
NRBC BLD-RTO: 0 /100 WBC
PLATELET # BLD AUTO: 261 K/UL (ref 164–446)
PMV BLD AUTO: 9.5 FL (ref 9–12.9)
POTASSIUM SERPL-SCNC: 3.9 MMOL/L (ref 3.6–5.5)
PROT SERPL-MCNC: 6.4 G/DL (ref 6–8.2)
RBC # BLD AUTO: 3.19 M/UL (ref 4.7–6.1)
RETICS # AUTO: 0.04 M/UL (ref 0.04–0.06)
RETICS/RBC NFR: 1.3 % (ref 0.8–2.1)
SIGNIFICANT IND 70042: ABNORMAL
SITE SITE: ABNORMAL
SODIUM SERPL-SCNC: 134 MMOL/L (ref 135–145)
SOURCE SOURCE: ABNORMAL
TIBC SERPL-MCNC: 261 UG/DL (ref 250–450)
UIBC SERPL-MCNC: 246 UG/DL (ref 110–370)
WBC # BLD AUTO: 17.6 K/UL (ref 4.8–10.8)

## 2021-05-01 PROCEDURE — 700117 HCHG RX CONTRAST REV CODE 255: Performed by: FAMILY MEDICINE

## 2021-05-01 PROCEDURE — 99233 SBSQ HOSP IP/OBS HIGH 50: CPT | Performed by: FAMILY MEDICINE

## 2021-05-01 PROCEDURE — 82728 ASSAY OF FERRITIN: CPT

## 2021-05-01 PROCEDURE — 83540 ASSAY OF IRON: CPT

## 2021-05-01 PROCEDURE — A9270 NON-COVERED ITEM OR SERVICE: HCPCS | Performed by: HOSPITALIST

## 2021-05-01 PROCEDURE — 97162 PT EVAL MOD COMPLEX 30 MIN: CPT

## 2021-05-01 PROCEDURE — 700105 HCHG RX REV CODE 258: Performed by: INTERNAL MEDICINE

## 2021-05-01 PROCEDURE — 700105 HCHG RX REV CODE 258: Performed by: HOSPITALIST

## 2021-05-01 PROCEDURE — 700102 HCHG RX REV CODE 250 W/ 637 OVERRIDE(OP): Performed by: HOSPITALIST

## 2021-05-01 PROCEDURE — 80053 COMPREHEN METABOLIC PANEL: CPT

## 2021-05-01 PROCEDURE — 700111 HCHG RX REV CODE 636 W/ 250 OVERRIDE (IP): Performed by: HOSPITALIST

## 2021-05-01 PROCEDURE — 700102 HCHG RX REV CODE 250 W/ 637 OVERRIDE(OP): Performed by: FAMILY MEDICINE

## 2021-05-01 PROCEDURE — 85046 RETICYTE/HGB CONCENTRATE: CPT

## 2021-05-01 PROCEDURE — 70355 PANORAMIC X-RAY OF JAWS: CPT

## 2021-05-01 PROCEDURE — 74177 CT ABD & PELVIS W/CONTRAST: CPT | Mod: MG

## 2021-05-01 PROCEDURE — 99223 1ST HOSP IP/OBS HIGH 75: CPT | Performed by: INTERNAL MEDICINE

## 2021-05-01 PROCEDURE — A9270 NON-COVERED ITEM OR SERVICE: HCPCS | Performed by: FAMILY MEDICINE

## 2021-05-01 PROCEDURE — 85025 COMPLETE CBC W/AUTO DIFF WBC: CPT

## 2021-05-01 PROCEDURE — 770020 HCHG ROOM/CARE - TELE (206)

## 2021-05-01 PROCEDURE — 700111 HCHG RX REV CODE 636 W/ 250 OVERRIDE (IP): Performed by: INTERNAL MEDICINE

## 2021-05-01 PROCEDURE — 83550 IRON BINDING TEST: CPT

## 2021-05-01 PROCEDURE — 97165 OT EVAL LOW COMPLEX 30 MIN: CPT

## 2021-05-01 PROCEDURE — 36415 COLL VENOUS BLD VENIPUNCTURE: CPT

## 2021-05-01 RX ORDER — FAMOTIDINE 20 MG/1
20 TABLET, FILM COATED ORAL DAILY
Status: DISCONTINUED | OUTPATIENT
Start: 2021-05-01 | End: 2021-05-07 | Stop reason: HOSPADM

## 2021-05-01 RX ORDER — HALOPERIDOL 5 MG/ML
1 INJECTION INTRAMUSCULAR EVERY 4 HOURS PRN
Status: DISCONTINUED | OUTPATIENT
Start: 2021-05-01 | End: 2021-05-07 | Stop reason: HOSPADM

## 2021-05-01 RX ORDER — LORAZEPAM 2 MG/ML
1 INJECTION INTRAMUSCULAR EVERY 4 HOURS PRN
Status: DISCONTINUED | OUTPATIENT
Start: 2021-05-01 | End: 2021-05-07 | Stop reason: HOSPADM

## 2021-05-01 RX ADMIN — RIVAROXABAN 15 MG: 15 TABLET, FILM COATED ORAL at 17:52

## 2021-05-01 RX ADMIN — IOHEXOL 100 ML: 350 INJECTION, SOLUTION INTRAVENOUS at 19:26

## 2021-05-01 RX ADMIN — SODIUM CHLORIDE, POTASSIUM CHLORIDE, SODIUM LACTATE AND CALCIUM CHLORIDE 1000 ML: 600; 310; 30; 20 INJECTION, SOLUTION INTRAVENOUS at 14:39

## 2021-05-01 RX ADMIN — PIPERACILLIN AND TAZOBACTAM 3.38 G: 3; .375 INJECTION, POWDER, LYOPHILIZED, FOR SOLUTION INTRAVENOUS; PARENTERAL at 06:19

## 2021-05-01 RX ADMIN — PIPERACILLIN AND TAZOBACTAM 3.38 G: 3; .375 INJECTION, POWDER, LYOPHILIZED, FOR SOLUTION INTRAVENOUS; PARENTERAL at 14:06

## 2021-05-01 RX ADMIN — AMPICILLIN AND SULBACTAM 3 G: 2; 1 INJECTION, POWDER, FOR SOLUTION INTRAVENOUS at 17:53

## 2021-05-01 RX ADMIN — FAMOTIDINE 20 MG: 20 TABLET ORAL at 17:52

## 2021-05-01 RX ADMIN — FINASTERIDE 5 MG: 5 TABLET, FILM COATED ORAL at 06:19

## 2021-05-01 ASSESSMENT — GAIT ASSESSMENTS
ASSISTIVE DEVICE: FRONT WHEEL WALKER
GAIT LEVEL OF ASSIST: MODERATE ASSIST
DISTANCE (FEET): 20

## 2021-05-01 ASSESSMENT — COGNITIVE AND FUNCTIONAL STATUS - GENERAL
WALKING IN HOSPITAL ROOM: A LOT
STANDING UP FROM CHAIR USING ARMS: A LOT
TOILETING: A LOT
SUGGESTED CMS G CODE MODIFIER DAILY ACTIVITY: CK
PERSONAL GROOMING: A LITTLE
HELP NEEDED FOR BATHING: A LOT
MOVING TO AND FROM BED TO CHAIR: UNABLE
DRESSING REGULAR LOWER BODY CLOTHING: A LOT
SUGGESTED CMS G CODE MODIFIER MOBILITY: CL
MOBILITY SCORE: 11
EATING MEALS: A LITTLE
CLIMB 3 TO 5 STEPS WITH RAILING: A LOT
MOVING FROM LYING ON BACK TO SITTING ON SIDE OF FLAT BED: A LOT
DRESSING REGULAR UPPER BODY CLOTHING: A LITTLE
DAILY ACTIVITIY SCORE: 15
TURNING FROM BACK TO SIDE WHILE IN FLAT BAD: A LOT

## 2021-05-01 ASSESSMENT — ENCOUNTER SYMPTOMS
COUGH: 0
FLANK PAIN: 0
HEARTBURN: 0
DEPRESSION: 0
DIZZINESS: 0
NAUSEA: 0
MYALGIAS: 0
NECK PAIN: 0
BLURRED VISION: 0
HEMOPTYSIS: 0
PHOTOPHOBIA: 0
HEADACHES: 0
PALPITATIONS: 0
DOUBLE VISION: 0
CHILLS: 1
FEVER: 1

## 2021-05-01 ASSESSMENT — FIBROSIS 4 INDEX: FIB4 SCORE: 3.55

## 2021-05-01 ASSESSMENT — LIFESTYLE VARIABLES: SUBSTANCE_ABUSE: 0

## 2021-05-01 ASSESSMENT — ACTIVITIES OF DAILY LIVING (ADL): TOILETING: INDEPENDENT

## 2021-05-01 NOTE — PROGRESS NOTES
2 RN skin check completed prabhjot/ Radha RN  Devices in place: Nasal cannula, PIV  Skin assessed under devices: Yes  Confirmed pressure ulcers found: NA  Interventions: nasal cannula ear cushion in place             Turn/reposition every 2 hrs

## 2021-05-01 NOTE — THERAPY
Occupational Therapy   Initial Evaluation     Patient Name: Alistair Tavares  Age:  89 y.o., Sex:  male  Medical Record #: [id number]  Today's Date: 5/1/2021     Precautions  Precautions: (P) Fall Risk, Other (See Comments)(hx of CVA with word fniding/cog issues; residual)    Assessment  Patient is 89 y.o. male with a diagnosis of sepsis due to UTI.  Additional factors influencing patient status / progress: Pt has supportive spouse that can assist 24/7. However, he must navigate 2 flights of stairs to get into and out of his apt.  Pt currently is pretty unsteady on his feet for ADLs and likely at risk for falls even with spouse assist. Pt will need post acute placement until he can go home with spouse assist safely.  He will benefit from acute OT to increase independence in ADLS prior to d/c.     Plan    Recommend Occupational Therapy 3 times per week until therapy goals are met for the following treatments:  Neuro Re-Education / Balance, Self Care/Activities of Daily Living, Therapeutic Activities and Therapeutic Exercises.    DC Equipment Recommendations: Unable to determine at this time  Discharge Recommendations: Recommend post-acute placement for additional occupational therapy services prior to discharge home        05/01/21 1622   Prior Living Situation   Prior Services Intermittent Physical Support for ADL Per Family   Housing / Facility 2 Story Apartment / Condo   Steps Into Home 15   Steps In Home 15   Bathroom Set up Bathtub / Shower Combination   Equipment Owned Wheelchair;Front-Wheel Walker;Tub Transfer Bench   Lives with - Patient's Self Care Capacity Spouse   Comments Spouse able to provide 24/7 care   Prior Level of ADL Function   Self Feeding Independent   Grooming / Hygiene Requires Assist   Bathing Requires Assist   Dressing Requires Assist   Toileting Independent   Prior Level of IADL Function   Medication Management Requires Assist   Laundry Requires Assist   Kitchen Mobility Requires Assist    Finances Requires Assist   Home Management Requires Assist   Shopping Requires Assist   Prior Level Of Mobility Supervision With Device in Community;Supervision With Device in Home   Driving / Transportation Relatives / Others Provide Transportation   History of Falls   History of Falls No  (per spouse)   Precautions   Precautions Fall Risk;Other (See Comments)  (aphasic and confusion)   Pain 0 - 10 Group   Therapist Pain Assessment Post Activity Pain Same as Prior to Activity;Nurse Notified;0   Non Verbal Descriptors   Non Verbal Scale  Calm   Cognition    Cognition / Consciousness X   Speech/ Communication Expressive Aphasia;Slurred   Orientation Level Not Oriented to Age;Not Oriented to Address;Not Oriented to Year;Not Oriented to Month;Not Oriented to Day;Not Oriented to Time;Not Oriented to Place;Not Oriented to Reason   Level of Consciousness Alert  (confused)   Ability To Follow Commands 1 Step  (not consistent)   Safety Awareness Impaired;Impulsive   Neurological Concerns   Neurological Concerns Yes  (hx CVA)   Coordination Upper Body   Coordination X   Comments impacted by confusion   Balance Assessment   Sitting Balance (Static) Fair -   Sitting Balance (Dynamic) Fair -   Standing Balance (Static) Poor +   Standing Balance (Dynamic) Poor +   Weight Shift Sitting Fair   Weight Shift Standing Fair   Comments using FWW   Bed Mobility    Supine to Sit Minimal Assist   Sit to Supine Supervised   Scooting Supervised   ADL Assessment   Grooming Seated;Minimal Assist   Lower Body Dressing Maximal Assist   Functional Mobility   Sit to Stand Minimal Assist   Bed, Chair, Wheelchair Transfer Minimal Assist   Transfer Method Stand Step   Activity Tolerance   Sitting in Chair 5 min   Sitting Edge of Bed 10 min   Standing 6 min   Patient / Family Goals   Patient / Family Goal #1 Wife: home soon   Short Term Goals   Short Term Goal # 1 Xfer to chair without LOB with supervision   Short Term Goal # 2 Toileting with  supervision   Problem List   Problem List Decreased Active Daily Living Skills;Decreased Functional Mobility;Decreased Activity Tolerance;Impaired Postural Control / Balance;Safety Awareness Deficits / Cognition   Anticipated Discharge Equipment and Recommendations   DC Equipment Recommendations Unable to determine at this time   Discharge Recommendations Recommend post-acute placement for additional occupational therapy services prior to discharge home

## 2021-05-01 NOTE — THERAPY
Physical Therapy   Initial Evaluation     Patient Name: Alistair Tavares  Age:  89 y.o., Sex:  male  Medical Record #: [id number]  Today's Date: 5/1/2021     Precautions: Fall Risk, Other (See Comments)(hx of CVA with word fniding/cog issues; residual)    Assessment  Pt presents to PT with impaired motor control, balance, ambulation and general locomotion associated with deconditioning and medical co-morbidities. His current deficits appear exacerbated 2' to old residual CVA deficits (which appear somewhat exacerbated as well currently). He essentially required consistent min/mod A with bed mobility and ambulation with FWW and is at increased risk for falls given current gait mechanics and assist levels. He needed consistent cueing/facilitation 2' to current cognition as well. Noted spouse present and very supportive and reports that he is somewhat more cognitively impaired than at baseline as well. Will continue to visit with details/recs below. Anticipate as cog clears to baseline/infection clears, he may progress quickly with re: function/mobility.     Plan    Recommend Physical Therapy 4 times per week until therapy goals are met for the following treatments:  Bed Mobility, Community Re-integration, Equipment, Gait Training, Manual Therapy, Neuro Re-Education / Balance, Self Care/Home Evaluation, Sensory Integration Techniques, Stair Training, Therapeutic Activities and Therapeutic Exercises    DC Equipment Recommendations: Unable to determine at this time  Discharge Recommendations: Recommend post-acute placement for additional physical therapy services prior to discharge home        05/01/21 3652   Prior Living Situation   Prior Services None   Housing / Facility 2 Story Apartment / Condo   Steps Into Home   (FOS to enter/FOS insight; rails)   Bathroom Set up Bathtub / Shower Combination;Tub Transfer Bench   Equipment Owned Front-Wheel Walker;Wheelchair   Lives with - Patient's Self Care Capacity Spouse    Comments spouse present and providing information as pt unable to accurately describe; spouse is available to assist 24/7    Prior Level of Functional Mobility   Bed Mobility Independent   Transfer Status Independent   Ambulation Independent  (I in home; Spv in community)   Distance Ambulation (Feet)   (mostly household I; Spv with wc push per spouse)   Assistive Devices Used   (wc push or FWW)   Wheelchair Required Assist   Stairs Other (Comments)  (Spv for stairs per spouse)   Comments at baseline, pt appears I with ambulation in home with FWW or furniture walking; in community wc push and when fatigues wife pushes pt in wc;    Cognition    Cognition / Consciousness X   Speech/ Communication Word Finding Impairment  (tangential at times with responses)   Level of Consciousness   (confused)   Safety Awareness Impaired;Impulsive   New Learning Impaired   Attention Impaired   Sequencing Impaired   Comments baseline cognitive deficits 2' to prior CVA + other co -morbidities; following 1 step intermittently   Passive ROM Lower Body   Passive ROM Lower Body WDL   Active ROM Lower Body    Active ROM Lower Body  WDL   Strength Lower Body   Lower Body Strength  X   Comments grossly WFL for ambulation, difficult testing as pt not consistent with command following   Sensation Lower Body   Lower Extremity Sensation   WDL   Neurological Concerns   Neurological Concerns Yes   Comments given baseline cognition and prior CVA   Balance Assessment   Sitting Balance (Static) Fair -   Sitting Balance (Dynamic) Poor +   Standing Balance (Static) Poor +   Standing Balance (Dynamic) Poor   Weight Shift Sitting Fair   Weight Shift Standing Poor   Comments pt tending to have LLE lean and impaired motor control/placement during ambulation ; noted poor placement and almost ataxic mechanics at times; almost exacerabated CVA like symptoms (2' to sepsis?)   Gait Analysis   Gait Level Of Assist Moderate Assist   Assistive Device Front Wheel  Walker   Distance (Feet) 20   # of Times Distance was Traveled 1   Deviation Decreased Base Of Support;Decreased Heel Strike;Decreased Toe Off;Ataxic;Other (Comment)  (reciprocal gait; dec daniel/clearance)   # of Stairs Climbed 0   Weight Bearing Status no restrictions   Comments see balance   Bed Mobility    Supine to Sit Minimal Assist   Sit to Supine Minimal Assist   Scooting Minimal Assist   Comments HOB elevated + rails; appears capable without physical assist; mostly cueingCGA for facilitation given cog   Functional Mobility   Sit to Stand Minimal Assist  (mostly CGA and cueing)   Bed, Chair, Wheelchair Transfer Moderate Assist   Transfer Method Stand Step   Mobility with FWW/HHA   How much difficulty does the patient currently have...   Turning over in bed (including adjusting bedclothes, sheets and blankets)? 2   Sitting down on and standing up from a chair with arms (e.g., wheelchair, bedside commode, etc.) 2   Moving from lying on back to sitting on the side of the bed? 1   How much help from another person does the patient currently need...   Moving to and from a bed to a chair (including a wheelchair)? 2   Need to walk in a hospital room? 2   Climbing 3-5 steps with a railing? 2   6 clicks Mobility Score 11   Activity Tolerance   Sitting in Chair at least 5 mins   Sitting Edge of Bed at least 8 mins   Standing at least 4-5 mins   Comments no overt/acute fatigue   Edema / Skin Assessment   Edema / Skin  Not Assessed   Patient / Family Goals    Patient / Family Goal #1 to go home   Short Term Goals    Short Term Goal # 1 pt will be able to perform supine<>sit with HOB flat/no rails with Spv in 6tx to promote fnx progression towads I    Short Term Goal # 2 Pt will be able to perform sit<>stand/transferes iwth FWW with Spv in 6tx to promote fnx progression towards I    Short Term Goal # 3 Pt will be able to ambulate 150ft with FWW with SPv in 6tx to promote fnx progression towards I    Short Term Goal # 4  Pt will be able to ambulate up/down FOS with rail with Spv in 6tx to promote dc to home plan   Education Group   Education Provided Role of Physical Therapist   Role of Physical Therapist Patient Response Patient;Significant Other;Acceptance;Explanation;Verbal Demonstration   Additional Comments education re: PT POC, dc planning, concerns and recs currently

## 2021-05-01 NOTE — CARE PLAN
Problem: Communication  Goal: The ability to communicate needs accurately and effectively will improve  Outcome: PROGRESSING AS EXPECTED     Problem: Safety  Goal: Will remain free from injury  Outcome: PROGRESSING AS EXPECTED  Goal: Will remain free from falls  Outcome: PROGRESSING AS EXPECTED     Problem: Bowel/Gastric:  Goal: Normal bowel function is maintained or improved  Outcome: PROGRESSING AS EXPECTED  Goal: Will not experience complications related to bowel motility  Outcome: PROGRESSING AS EXPECTED     Problem: Discharge Barriers/Planning  Goal: Patient's continuum of care needs will be met  Outcome: PROGRESSING AS EXPECTED     Problem: Pain Management  Goal: Pain level will decrease to patient's comfort goal  Outcome: PROGRESSING AS EXPECTED

## 2021-05-01 NOTE — CONSULTS
[facility] INFECTIOUS DISEASES INPATIENT CONSULT NOTE     Date of Service: 5/1/2021    Consult Requested By: Renetta Mohamud M.D.    Reason for Consultation: Strep bacteremia    Chief Complaint: Generalized weakness    History of Present Illness:     Alistair Tavares is a 89 y.o. male admitted 4/30/2021.  Patient with known history of CAD, paroxysmal A. fib and CVA on Xarelto, severe aortic stenosis deemed not a surgical candidate given frailty, reported history of recurrent UTIs, presented on 4/30 with fevers and chills for the prior few days along with generalized weakness and body aches.  Patient was afebrile in the ER.  He was noted to have a leukocytosis of 15.4, up to 17.6.  He had no urinary symptoms, but because of pyuria on urinalysis, he was thought to have a UTI.  He was started on IV Zosyn.  Blood cultures x2 are now positive for Strep gallolyticus.  ID consulted for recommendations.    Patient is hard of hearing and confused.  History obtained from wife in the room.  Patient with no changes in bowel habits, no weight loss, no recent colonoscopy.    Review of Systems:  All other systems reviewed and are negative expect as noted in HPI    Past Medical History:   Diagnosis Date   • Angina 2005    episode of atrial fibrillation   • Arrhythmia     History of afib, no longer on medication   • CAD (coronary artery disease)     paroximal atrial tachycardia   • CATARACT     asim iol   • Dental disorder     lower dentures   • Macular degeneration of both eyes    • Pneumonia 2008    Not hospitalized for it   • Renal disorder 8/4/2012    pyelonephritis   • Stroke (HCC) 2015    pt reports no residual weakness   • Unspecified urinary incontinence     dribbles       Past Surgical History:   Procedure Laterality Date   • CYSTOSCOPY  6/13/2016    Procedure: CYSTOSCOPY 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;  Surgeon: Bucky Bustamante M.D.;  Location: SURGERY [facility];  Service:    • CATARACT PHACO  WITH IOL  10/28/2009    Performed by PAULA COE at [facility]   • CATARACT PHACO WITH IOL  10/14/2009    Performed by PAULA COE at [facility]   • OTHER ORTHOPEDIC SURGERY      PATELLA RIGHT       Family History   Problem Relation Age of Onset   • Lung Disease Father         emphysima   • Arthritis Neg Hx        Social History     Socioeconomic History   • Marital status:      Spouse name: Not on file   • Number of children: Not on file   • Years of education: Not on file   • Highest education level: Not on file   Occupational History   • Not on file   Tobacco Use   • Smoking status: Never Smoker   • Smokeless tobacco: Never Used   Substance and Sexual Activity   • Alcohol use: Yes     Alcohol/week: 2.4 - 3.0 oz     Types: 3 Standard drinks or equivalent, 1 - 2 Cans of beer per week   • Drug use: No   • Sexual activity: Never     Partners: Female   Other Topics Concern   • Not on file   Social History Narrative   • Not on file     Social Determinants of Health     Financial Resource Strain:    • Difficulty of Paying Living Expenses:    Food Insecurity:    • Worried About Running Out of Food in the Last Year:    • Ran Out of Food in the Last Year:    Transportation Needs:    • Lack of Transportation (Medical):    • Lack of Transportation (Non-Medical):    Physical Activity:    • Days of Exercise per Week:    • Minutes of Exercise per Session:    Stress:    • Feeling of Stress :    Social Connections:    • Frequency of Communication with Friends and Family:    • Frequency of Social Gatherings with Friends and Family:    • Attends [employer] Services:    • Active Member of Clubs or Organizations:    • Attends Club or Organization Meetings:    • Marital Status:    Intimate Partner Violence:    • Fear of Current or Ex-Partner:    • Emotionally Abused:    • Physically Abused:    • Sexually Abused:        Allergies   Allergen Reactions   • Bloodless Unspecified      "Pt states he is willing to accept blood/blood products as \"a last resort\"   • Crestor [Rosuvastatin Calcium] Rash and Itching     Rash/itching all over body   • Eliquis [Apixaban] Rash           • Flomax [Tamsulosin] Unspecified     Pts wife states \"His BP goes really low and he fell down\"   • Heparin Hives, Rash and Itching     Pts wife states \"Rash all over body\".   • Lipitor [Atorvastatin] Rash     Pts wife states \"Rash all over body\".   • Macrobid [Kdc:Red Dye+Yellow Dye+Nitrofurantoin+Brilliant Blue Fcf] Nausea     Pt's wife states \"Makes pt weak and nausea\".   • Statins [Hmg-Coa-R Inhibitors] Rash and Itching     Rash/itching all over body   • Emcin Clear Unspecified         • Coufarin [Warfarin] Unspecified     Pts wife states \"He just does not like that drug\".       Medications:    Current Facility-Administered Medications:   •  famotidine (PEPCID) tablet 20 mg, 20 mg, Oral, DAILY, Renetta Mohamud M.D.  •  rivaroxaban (XARELTO) tablet 15 mg, 15 mg, Oral, PM MEAL, Bryce Anderson M.D., 15 mg at 04/30/21 1837  •  finasteride (PROSCAR) tablet 5 mg, 5 mg, Oral, QAM, Bryce Anderson M.D., 5 mg at 05/01/21 0619  •  lactated ringers infusion, 1,000 mL, Intravenous, Continuous, Bryce Anderson M.D., Last Rate: 75 mL/hr at 05/01/21 1439, 1,000 mL at 05/01/21 1439  •  acetaminophen (Tylenol) tablet 650 mg, 650 mg, Oral, Q6HRS PRN, Bryce Anderson M.D.  •  Notify provider if pain remains uncontrolled, , , CONTINUOUS **AND** Use the Numeric Rating Scale (NRS), Cortez-Baker Faces (WBF), or FLACC on regular floors and Critical-Care Pain Observation Tool (CPOT) on ICUs/Trauma to assess pain, , , CONTINUOUS **AND** Pulse Ox, , , CONTINUOUS **AND** Pharmacy Consult Request ...Pain Management Review 1 Each, 1 Each, Other, PHARMACY TO DOSE **AND** If patient difficult to arouse and/or has respiratory depression (respiratory rate of 10 or less), stop any opiates that are currently infusing and call a Rapid Response., , , " CONTINUOUS, Bryce Anderson M.D.  •  oxyCODONE immediate-release (ROXICODONE) tablet 2.5 mg, 2.5 mg, Oral, Q3HRS PRN **OR** oxyCODONE immediate-release (ROXICODONE) tablet 5 mg, 5 mg, Oral, Q3HRS PRN **OR** HYDROmorphone (Dilaudid) injection 0.25 mg, 0.25 mg, Intravenous, Q3HRS PRN, Bryce Anderson M.D.  •  ondansetron (ZOFRAN) syringe/vial injection 4 mg, 4 mg, Intravenous, Q4HRS PRN, Bryce Anderson M.D.  •  ondansetron (ZOFRAN ODT) dispertab 4 mg, 4 mg, Oral, Q4HRS PRN, Bryce Anderson M.D.  •  piperacillin-tazobactam (ZOSYN) 3.375 g in  mL IVPB, 3.375 g, Intravenous, Q8HRS, Bryce Anderson M.D., Last Rate: 25 mL/hr at 21 1406, 3.375 g at 21 1406    Physical Exam:   Vital Signs: /74   Pulse 78   Temp 37.3 °C (99.1 °F) (Temporal)   Resp 16   Ht 1.829 m (6')   Wt 64 kg (141 lb 1.5 oz)   SpO2 94%   BMI 19.14 kg/m²   Temp  Av.9 °C (98.4 °F)  Min: 36.4 °C (97.5 °F)  Max: 37.3 °C (99.2 °F)  Vital signs reviewed  Constitutional: Patient is elderly and frail-appearing, no acute distress, confused  Head: Atraumatic, normocephalic  Eyes: Conjunctivae normal  Mouth/Throat: Lips without lesions  Neck: Neck supple. No masses/lymphadenopathy  Cardiovascular: Normal rate, regular rhythm, normal S1S2 and intact distal pulses. No pedal edema.  Pulmonary/Chest: No respiratory distress. Unlabored respiratory effort, lungs clear to auscultation. No wheezes or rales.   Abdominal: Soft, non tender. BS + x 4. No masses or hepatosplenomegaly.   Musculoskeletal: No joint tenderness, swelling, erythema, or restriction of motion noted.  Neurological: Hard of hearing, following simple commands  Skin: Skin is warm and dry. No rashes or embolic phenomena noted on exposed skin  Psychiatric: as above    LABS:  Recent Labs     21  0915 21  0857   WBC 15.4* 17.6*      Recent Labs     21  0915 21  0857   HEMOGLOBIN 8.4* 7.6*   HEMATOCRIT 27.9* 24.3*   MCV 77.1* 76.2*   MCH 23.2*  23.8*   PLATELETCT 323 261       Recent Labs     04/30/21  0915 05/01/21  0857   SODIUM 130* 134*   POTASSIUM 5.1 3.9   CHLORIDE 100 102   CO2 20 25   CREATININE 0.82 0.93        Recent Labs     04/30/21  0915 05/01/21  0857   ALBUMIN 3.6 3.3        MICRO:  Blood Culture   Date Value Ref Range Status   08/09/2016 No growth after 5 days of incubation.  Final        Latest pertinent labs were reviewed    IMAGING STUDIES:  Chest x-ray with left basilar atelectatic changes    Hospital Course/Assessment:   Alistair Tavares is a 89 y.o. male with known history of CAD, paroxysmal A. fib and CVA on Xarelto, severe aortic stenosis deemed not a surgical candidate given frailty, reported history of recurrent UTIs, presented on 4/30 with fevers and chills for the prior few days along with generalized weakness and body aches, found to have Strep gallolyticus bacteremia    Pertinent Diagnoses:  Sepsis  Strep gallolyticus bacteremia  Asymptomatic bacteriuria  Severe aortic stenosis, deemed not a surgical candidate  Paroxysmal A. Fib  Chronic anticoagulation   CAD  History of CVA    Plan:   -Will switch IV Zosyn to IV Unasyn 3 g every 6 hours  -Repeat blood cultures x2 in a.m  -Strep gallolyticus (formerly bovis) is a GI organism, associated with colon cancer  -Recommend CT abdomen and pelvis  -Agree with TTE    Plan was discussed with the primary team, Dr. Mohamud    ID will follow. Please feel free to call with questions.    Len Raymond M.D.    Please note that this dictation was created using voice recognition software. I have worked with technical experts from [facility] to optimize the interface.  I have made every reasonable attempt to correct obvious errors, but there may be errors of grammar and possibly content that I did not discover before finalizing the note.

## 2021-05-01 NOTE — ASSESSMENT & PLAN NOTE
Blood cultures current Streptococcus gallolyticus 2/2 (Step bovis)   Pt and wife do not want any invasive procedure.  ID recs course of abx is noted.  At discharge, switch to Levaquin 750mg daily until 5/12.  Palliative Care following for Advance Care planing.

## 2021-05-01 NOTE — PROGRESS NOTES
Chichi fontaine micro called with positive blood culture results X2. MD James called and updated at 2126

## 2021-05-01 NOTE — CARE PLAN
Problem: Communication  Goal: The ability to communicate needs accurately and effectively will improve  Outcome: PROGRESSING AS EXPECTED  Intervention: Reorient patient to environment as needed  Note: Patient has been reoriented to environment though out the shift      Problem: Infection  Goal: Will remain free from infection  Outcome: PROGRESSING AS EXPECTED  Intervention: Assess signs and symptoms of infection  Note: Patient remains free of infection, as evidence by normal vitals and absence of sign and symptoms of infection.

## 2021-05-01 NOTE — PROGRESS NOTES
Assumed care at 1900, bedside report received from day shift RN. Pt on the monitor. Initial assessment completed, orders reviewed, call light within reach, bed alarm in use, and hourly rounding in place. POC addressed with patient and wife, no additional questions at this time.

## 2021-05-01 NOTE — PROGRESS NOTES
[facility] Medicine Daily Progress Note    Date of Service  5/1/2021    Chief Complaint  89 y.o. male admitted 4/30/2021 with fever, generalized body aches, and generalized weakness.    Hospital Course  This is a 89 years old male who has past medical history of coronary artery disease, paroxysmal A. fib and stroke on Xarelto, history of severe aortic stenosis deemed not a candidate for intervention given patient frailty and lack of ability to recover from such a procedure, and history of recurrent UTI who has been experiencing fever and chills over the last few days.  Also patient has been feeling weak and have generalized body aches.  Presented to the hospital for further evaluation.  In ER noted to be afebrile.  Hemodynamically stable.  Blood work noted to have leukocytosis.  Patient does not report urinary symptoms.  However, per his wife he is usually asymptomatic with his history of previous UTI's.  UA was positive.  Started on antibiotics.  Chest x-ray showed left basilar atelectasis with no infiltrate or consolidation.  Blood culture came back positive for Streptococcus species.  ID consulted.  Interval Problem Update  Resting comfortably.  Feels tired.  Easily awakens.  Afebrile.  Hemodynamically stable.  No acute distress noted.  No issues overnight per staff.    Consultants/Specialty  ID    Code Status  Full Code    Disposition  Pending clinical course    Review of Systems  Review of Systems   Constitutional: Positive for chills, fever and malaise/fatigue.   HENT: Negative for hearing loss and tinnitus.    Eyes: Negative for blurred vision, double vision and photophobia.   Respiratory: Negative for cough and hemoptysis.    Cardiovascular: Negative for chest pain and palpitations.   Gastrointestinal: Negative for heartburn and nausea.   Genitourinary: Negative for dysuria, flank pain, hematuria and urgency.   Musculoskeletal: Negative for myalgias and neck pain.   Skin: Negative for rash.   Neurological:  Negative for dizziness and headaches.   Psychiatric/Behavioral: Negative for depression, substance abuse and suicidal ideas.        Physical Exam  Temp:  [36.4 °C (97.5 °F)-37.3 °C (99.1 °F)] 37.3 °C (99.1 °F)  Pulse:  [69-92] 78  Resp:  [15-18] 16  BP: (121-140)/(65-79) 132/74  SpO2:  [92 %-98 %] 94 %    Physical Exam  Constitutional:       General: He is not in acute distress.  HENT:      Head: Normocephalic and atraumatic.      Mouth/Throat:      Mouth: Mucous membranes are dry.   Eyes:      Extraocular Movements: Extraocular movements intact.      Pupils: Pupils are equal, round, and reactive to light.   Cardiovascular:      Rate and Rhythm: Normal rate and regular rhythm.      Heart sounds: No friction rub. No gallop.    Pulmonary:      Effort: Pulmonary effort is normal. No respiratory distress.   Abdominal:      General: There is no distension.      Palpations: Abdomen is soft.   Musculoskeletal:         General: No swelling or tenderness.   Skin:     General: Skin is dry.   Neurological:      General: No focal deficit present.      Mental Status: He is alert and oriented to person, place, and time.         Fluids    Intake/Output Summary (Last 24 hours) at 5/1/2021 1425  Last data filed at 5/1/2021 1000  Gross per 24 hour   Intake 180 ml   Output 1600 ml   Net -1420 ml       Laboratory  Recent Labs     04/30/21  0915 05/01/21  0857   WBC 15.4* 17.6*   RBC 3.62* 3.19*   HEMOGLOBIN 8.4* 7.6*   HEMATOCRIT 27.9* 24.3*   MCV 77.1* 76.2*   MCH 23.2* 23.8*   MCHC 30.1* 31.3*   RDW 47.0 45.5   PLATELETCT 323 261   MPV 9.9 9.5     Recent Labs     04/30/21  0915 05/01/21  0857   SODIUM 130* 134*   POTASSIUM 5.1 3.9   CHLORIDE 100 102   CO2 20 25   GLUCOSE 112* 100*   BUN 18 17   CREATININE 0.82 0.93   CALCIUM 8.9 8.7                   Imaging  DX-CHEST-PORTABLE (1 VIEW)   Final Result      Left basilar opacity which could represent atelectasis and/or pneumonitis.      Mild interstitial opacities most likely represent  chronic changes and/or mild vascular congestion.      CT-HEAD W/O   Final Result         1.  No acute intracranial process.      2. Diffuse atrophy and periventricular white matter changes consistent with chronic small vessel disease. Encephalomalacia in the right frontal and left temporal lobes      EC-ECHOCARDIOGRAM COMPLETE W/O CONT    (Results Pending)   [id number]    (Results Pending)        Assessment/Plan  * Recurrent UTI- (present on admission)  Assessment & Plan  With no fevers this morning  Suspect urinary source  Follow-up UA, continue IV antibiotics  Follow labs    Bacteremia  Assessment & Plan  Blood cultures current Streptococcus gallolyticus 2/2  Awaiting sensitivity.  On Zosyn for now.  Echocardiogram ordered to rule out endocarditis.  ID consulted         Sepsis ([facility])  Assessment & Plan  This is Sepsis Present on admission  SIRS criteria identified on my evaluation include: Fever, with temperature greater than 101 deg F and Tachycardia, with heart rate greater than 90 BPM  Source is either urinary or lungs  Suspected onset of infection (date and time) a.m. 4/30/2021  Sepsis protocol initiated  Fluid resuscitation ordered per protocol  IV antibiotics as appropriate for source of sepsis  While organ dysfunction may be noted elsewhere in this problem list or in the chart, degree of organ dysfunction does not meet CMS criteria for severe sepsis          PAF (paroxysmal atrial fibrillation) ([facility])- (present on admission)  Assessment & Plan  Rate controlled, continue Xarelto    Lactic acidosis- (present on admission)  Assessment & Plan  Likely secondary to above.  Continue with gentle IV hydration.  Trend and monitor.    Microcytic anemia- (present on admission)  Assessment & Plan  Check iron studies.  Monitor H&H.    History of stroke- (present on admission)  Assessment & Plan  With some residual speech deficits, consider aspiration as etiology as well, follow-up chest x-ray  PT OT to be  ordered       VTE prophylaxis: Xarleto

## 2021-05-02 ENCOUNTER — APPOINTMENT (OUTPATIENT)
Dept: CARDIOLOGY | Facility: MEDICAL CENTER | Age: 86
DRG: 872 | End: 2021-05-02
Attending: FAMILY MEDICINE
Payer: MEDICARE

## 2021-05-02 LAB
ALBUMIN SERPL BCP-MCNC: 3.4 G/DL (ref 3.2–4.9)
ALBUMIN/GLOB SERPL: 1 G/DL
ALP SERPL-CCNC: 167 U/L (ref 30–99)
ALT SERPL-CCNC: 44 U/L (ref 2–50)
ANION GAP SERPL CALC-SCNC: 10 MMOL/L (ref 7–16)
AST SERPL-CCNC: 60 U/L (ref 12–45)
BACTERIA BLD CULT: ABNORMAL
BACTERIA BLD CULT: ABNORMAL
BACTERIA UR CULT: NORMAL
BASOPHILS # BLD AUTO: 0.7 % (ref 0–1.8)
BASOPHILS # BLD: 0.1 K/UL (ref 0–0.12)
BILIRUB SERPL-MCNC: 0.6 MG/DL (ref 0.1–1.5)
BUN SERPL-MCNC: 18 MG/DL (ref 8–22)
CALCIUM SERPL-MCNC: 8.6 MG/DL (ref 8.5–10.5)
CHLORIDE SERPL-SCNC: 104 MMOL/L (ref 96–112)
CO2 SERPL-SCNC: 25 MMOL/L (ref 20–33)
CREAT SERPL-MCNC: 0.74 MG/DL (ref 0.5–1.4)
EOSINOPHIL # BLD AUTO: 0.18 K/UL (ref 0–0.51)
EOSINOPHIL NFR BLD: 1.3 % (ref 0–6.9)
ERYTHROCYTE [DISTWIDTH] IN BLOOD BY AUTOMATED COUNT: 47.6 FL (ref 35.9–50)
GLOBULIN SER CALC-MCNC: 3.4 G/DL (ref 1.9–3.5)
GLUCOSE SERPL-MCNC: 103 MG/DL (ref 65–99)
HCT VFR BLD AUTO: 25.4 % (ref 42–52)
HGB BLD-MCNC: 7.8 G/DL (ref 14–18)
IMM GRANULOCYTES # BLD AUTO: 0.07 K/UL (ref 0–0.11)
IMM GRANULOCYTES NFR BLD AUTO: 0.5 % (ref 0–0.9)
LACTATE BLD-SCNC: 3.3 MMOL/L (ref 0.5–2)
LYMPHOCYTES # BLD AUTO: 1 K/UL (ref 1–4.8)
LYMPHOCYTES NFR BLD: 7 % (ref 22–41)
MCH RBC QN AUTO: 23.6 PG (ref 27–33)
MCHC RBC AUTO-ENTMCNC: 30.7 G/DL (ref 33.7–35.3)
MCV RBC AUTO: 76.7 FL (ref 81.4–97.8)
MONOCYTES # BLD AUTO: 1.8 K/UL (ref 0–0.85)
MONOCYTES NFR BLD AUTO: 12.5 % (ref 0–13.4)
NEUTROPHILS # BLD AUTO: 11.23 K/UL (ref 1.82–7.42)
NEUTROPHILS NFR BLD: 78 % (ref 44–72)
NRBC # BLD AUTO: 0 K/UL
NRBC BLD-RTO: 0 /100 WBC
PLATELET # BLD AUTO: 291 K/UL (ref 164–446)
PMV BLD AUTO: 9.7 FL (ref 9–12.9)
POTASSIUM SERPL-SCNC: 3.7 MMOL/L (ref 3.6–5.5)
PROT SERPL-MCNC: 6.8 G/DL (ref 6–8.2)
RBC # BLD AUTO: 3.31 M/UL (ref 4.7–6.1)
SIGNIFICANT IND 70042: ABNORMAL
SIGNIFICANT IND 70042: NORMAL
SITE SITE: ABNORMAL
SITE SITE: NORMAL
SODIUM SERPL-SCNC: 139 MMOL/L (ref 135–145)
SOURCE SOURCE: ABNORMAL
SOURCE SOURCE: NORMAL
WBC # BLD AUTO: 14.4 K/UL (ref 4.8–10.8)

## 2021-05-02 PROCEDURE — 36415 COLL VENOUS BLD VENIPUNCTURE: CPT

## 2021-05-02 PROCEDURE — 700111 HCHG RX REV CODE 636 W/ 250 OVERRIDE (IP): Performed by: STUDENT IN AN ORGANIZED HEALTH CARE EDUCATION/TRAINING PROGRAM

## 2021-05-02 PROCEDURE — A9270 NON-COVERED ITEM OR SERVICE: HCPCS | Performed by: FAMILY MEDICINE

## 2021-05-02 PROCEDURE — 99233 SBSQ HOSP IP/OBS HIGH 50: CPT | Performed by: INTERNAL MEDICINE

## 2021-05-02 PROCEDURE — 700102 HCHG RX REV CODE 250 W/ 637 OVERRIDE(OP): Performed by: FAMILY MEDICINE

## 2021-05-02 PROCEDURE — 83605 ASSAY OF LACTIC ACID: CPT

## 2021-05-02 PROCEDURE — 80053 COMPREHEN METABOLIC PANEL: CPT

## 2021-05-02 PROCEDURE — 700111 HCHG RX REV CODE 636 W/ 250 OVERRIDE (IP): Performed by: FAMILY MEDICINE

## 2021-05-02 PROCEDURE — 700105 HCHG RX REV CODE 258: Performed by: INTERNAL MEDICINE

## 2021-05-02 PROCEDURE — 700101 HCHG RX REV CODE 250: Performed by: FAMILY MEDICINE

## 2021-05-02 PROCEDURE — 770020 HCHG ROOM/CARE - TELE (206)

## 2021-05-02 PROCEDURE — 700111 HCHG RX REV CODE 636 W/ 250 OVERRIDE (IP): Performed by: INTERNAL MEDICINE

## 2021-05-02 PROCEDURE — A9270 NON-COVERED ITEM OR SERVICE: HCPCS | Performed by: HOSPITALIST

## 2021-05-02 PROCEDURE — 99232 SBSQ HOSP IP/OBS MODERATE 35: CPT | Performed by: FAMILY MEDICINE

## 2021-05-02 PROCEDURE — 87040 BLOOD CULTURE FOR BACTERIA: CPT | Mod: 91

## 2021-05-02 PROCEDURE — 51798 US URINE CAPACITY MEASURE: CPT

## 2021-05-02 PROCEDURE — 93306 TTE W/DOPPLER COMPLETE: CPT

## 2021-05-02 PROCEDURE — 700102 HCHG RX REV CODE 250 W/ 637 OVERRIDE(OP): Performed by: HOSPITALIST

## 2021-05-02 PROCEDURE — 93306 TTE W/DOPPLER COMPLETE: CPT | Mod: 26 | Performed by: INTERNAL MEDICINE

## 2021-05-02 PROCEDURE — 85025 COMPLETE CBC W/AUTO DIFF WBC: CPT

## 2021-05-02 RX ORDER — DIPHENHYDRAMINE HCL 25 MG
25 TABLET ORAL ONCE
Status: DISCONTINUED | OUTPATIENT
Start: 2021-05-02 | End: 2021-05-02

## 2021-05-02 RX ORDER — ACETAMINOPHEN 325 MG/1
650 TABLET ORAL ONCE
Status: DISCONTINUED | OUTPATIENT
Start: 2021-05-02 | End: 2021-05-02

## 2021-05-02 RX ORDER — DIPHENHYDRAMINE HYDROCHLORIDE 50 MG/ML
25 INJECTION INTRAMUSCULAR; INTRAVENOUS ONCE
Status: DISCONTINUED | OUTPATIENT
Start: 2021-05-02 | End: 2021-05-02

## 2021-05-02 RX ORDER — DIPHENHYDRAMINE HYDROCHLORIDE 50 MG/ML
25 INJECTION INTRAMUSCULAR; INTRAVENOUS ONCE
Status: DISCONTINUED | OUTPATIENT
Start: 2021-05-03 | End: 2021-05-03

## 2021-05-02 RX ORDER — DIPHENHYDRAMINE HCL 25 MG
25 TABLET ORAL ONCE
Status: COMPLETED | OUTPATIENT
Start: 2021-05-02 | End: 2021-05-02

## 2021-05-02 RX ORDER — DIPHENHYDRAMINE HCL 25 MG
25 TABLET ORAL EVERY 8 HOURS PRN
Status: DISCONTINUED | OUTPATIENT
Start: 2021-05-02 | End: 2021-05-02

## 2021-05-02 RX ORDER — CEFAZOLIN SODIUM 2 G/100ML
2 INJECTION, SOLUTION INTRAVENOUS EVERY 8 HOURS
Status: DISCONTINUED | OUTPATIENT
Start: 2021-05-02 | End: 2021-05-07 | Stop reason: HOSPADM

## 2021-05-02 RX ORDER — DIPHENHYDRAMINE HYDROCHLORIDE, ZINC ACETATE 2; .1 G/100G; G/100G
CREAM TOPICAL 3 TIMES DAILY PRN
Status: DISCONTINUED | OUTPATIENT
Start: 2021-05-02 | End: 2021-05-07 | Stop reason: HOSPADM

## 2021-05-02 RX ORDER — ACETAMINOPHEN 325 MG/1
650 TABLET ORAL ONCE
Status: DISCONTINUED | OUTPATIENT
Start: 2021-05-03 | End: 2021-05-03

## 2021-05-02 RX ORDER — DIPHENHYDRAMINE HCL 25 MG
25 TABLET ORAL ONCE
Status: DISCONTINUED | OUTPATIENT
Start: 2021-05-03 | End: 2021-05-03

## 2021-05-02 RX ADMIN — AMPICILLIN AND SULBACTAM 3 G: 2; 1 INJECTION, POWDER, FOR SOLUTION INTRAVENOUS at 11:51

## 2021-05-02 RX ADMIN — HALOPERIDOL LACTATE 1 MG: 5 INJECTION, SOLUTION INTRAMUSCULAR at 22:48

## 2021-05-02 RX ADMIN — AMPICILLIN AND SULBACTAM 3 G: 2; 1 INJECTION, POWDER, FOR SOLUTION INTRAVENOUS at 00:04

## 2021-05-02 RX ADMIN — METRONIDAZOLE 500 MG: 500 INJECTION, SOLUTION INTRAVENOUS at 18:27

## 2021-05-02 RX ADMIN — DIPHENHYDRAMINE HYDROCHLORIDE, ZINC ACETATE: 2; .1 CREAM TOPICAL at 23:17

## 2021-05-02 RX ADMIN — CEFAZOLIN SODIUM 2 G: 2 INJECTION, SOLUTION INTRAVENOUS at 17:29

## 2021-05-02 RX ADMIN — DIPHENHYDRAMINE HYDROCHLORIDE 25 MG: 25 TABLET ORAL at 15:18

## 2021-05-02 RX ADMIN — AMPICILLIN AND SULBACTAM 3 G: 2; 1 INJECTION, POWDER, FOR SOLUTION INTRAVENOUS at 06:04

## 2021-05-02 ASSESSMENT — ENCOUNTER SYMPTOMS
FEVER: 0
DEPRESSION: 0
HEARTBURN: 0
MEMORY LOSS: 1
NECK PAIN: 0
HEMOPTYSIS: 0
COUGH: 0
CHILLS: 0
DOUBLE VISION: 0
PALPITATIONS: 0
TINGLING: 0
MYALGIAS: 0
BLURRED VISION: 0
DIZZINESS: 0
HEADACHES: 0
NAUSEA: 0

## 2021-05-02 ASSESSMENT — LIFESTYLE VARIABLES: SUBSTANCE_ABUSE: 0

## 2021-05-02 ASSESSMENT — FIBROSIS 4 INDEX: FIB4 SCORE: 2.98

## 2021-05-02 NOTE — FLOWSHEET NOTE
Telemetry Shift Summary     Rhythm: Afib  HR Range: 80-90  Ectopy: PVC, Couplet  Measurements: -/0.10/-              Normal Values  Rhythm SR  HR Range    Measurements 0.12-0.20 / 0.06-0.10  / 0.30-0.52

## 2021-05-02 NOTE — PROGRESS NOTES
Infectious Disease Progress Note    Author: Len Raymond M.D. Date & Time of service: 2021  1:34 PM    Chief Complaint:  Follow-up for bacteremia    Interval History:   patient remains afebrile, white count down to 14.4, he is resting comfortably this morning, per wife no acute events overnight and no issues with new antibiotics    Labs Reviewed and Medications Reviewed.    Review of Systems:  Review of Systems   Unable to perform ROS: Mental status change       Hemodynamics:  Temp (24hrs), Av.8 °C (98.2 °F), Min:36.3 °C (97.4 °F), Max:37.1 °C (98.7 °F)  Temperature: 37.1 °C (98.7 °F)  Pulse  Av.3  Min: 69  Max: 103   Blood Pressure : 143/102       Physical Exam:  Physical Exam  Vitals and nursing note reviewed.   Constitutional:       Comments: Elderly and frail appearing, resting this morning   HENT:      Mouth/Throat:      Mouth: Mucous membranes are dry.      Pharynx: No oropharyngeal exudate.   Cardiovascular:      Rate and Rhythm: Normal rate and regular rhythm.   Pulmonary:      Effort: Pulmonary effort is normal. No respiratory distress.      Breath sounds: No stridor.   Abdominal:      General: There is no distension.      Tenderness: There is no abdominal tenderness.   Musculoskeletal:         General: No tenderness.      Cervical back: No rigidity.   Skin:     Findings: No erythema or rash.   Neurological:      Comments: Confused, follows simple commands   Psychiatric:      Comments: Unable to assess         Meds:    Current Facility-Administered Medications:   •  diphenhydrAMINE  •  MD Alert...Total Body Iron Replacement per Pharmacy  •  famotidine  •  ampicillin-sulbactam (UNASYN) IV  •  haloperidol lactate  •  LORazepam  •  rivaroxaban  •  finasteride  •  acetaminophen  •  Notify provider if pain remains uncontrolled **AND** Use the Numeric Rating Scale (NRS), Cortez-Baker Faces (WBF), or FLACC on regular floors and Critical-Care Pain Observation Tool (CPOT) on ICUs/Trauma to assess  pain **AND** Pulse Ox **AND** Pharmacy Consult Request **AND** If patient difficult to arouse and/or has respiratory depression (respiratory rate of 10 or less), stop any opiates that are currently infusing and call a Rapid Response.  •  oxyCODONE immediate-release **OR** oxyCODONE immediate-release **OR** HYDROmorphone  •  ondansetron  •  ondansetron    Labs:  Recent Labs     04/30/21 0915 05/01/21  0857 05/02/21  0344   WBC 15.4* 17.6* 14.4*   RBC 3.62* 3.19* 3.31*   HEMOGLOBIN 8.4* 7.6* 7.8*   HEMATOCRIT 27.9* 24.3* 25.4*   MCV 77.1* 76.2* 76.7*   MCH 23.2* 23.8* 23.6*   RDW 47.0 45.5 47.6   PLATELETCT 323 261 291   MPV 9.9 9.5 9.7   NEUTSPOLYS 89.10* 84.30* 78.00*   LYMPHOCYTES 4.00* 5.30* 7.00*   MONOCYTES 5.80 9.20 12.50   EOSINOPHILS 0.30 0.20 1.30   BASOPHILS 0.30 0.40 0.70     Recent Labs     04/30/21 0915 05/01/21  0857 05/02/21  0344   SODIUM 130* 134* 139   POTASSIUM 5.1 3.9 3.7   CHLORIDE 100 102 104   CO2 20 25 25   GLUCOSE 112* 100* 103*   BUN 18 17 18     Recent Labs     04/30/21 0915 05/01/21  0857 05/02/21  0344   ALBUMIN 3.6 3.3 3.4   TBILIRUBIN 0.7 1.0 0.6   ALKPHOSPHAT 114* 133* 167*   TOTPROTEIN 7.2 6.4 6.8   ALTSGPT 12 28 44   ASTSGOT 28 51* 60*   CREATININE 0.82 0.93 0.74       Imaging:  CT-ABDOMEN-PELVIS WITH    Result Date: 5/1/2021 5/1/2021 7:25 PM HISTORY/REASON FOR EXAM:  Sepsis. TECHNIQUE/EXAM DESCRIPTION:   Contiguous axial images were obtained from the diaphragmatic domes to the pubic symphysis following intravenous contrast administration. Coronal and sagittal reformats were generated and reviewed. 100 mL of Omnipaque 350 nonionic contrast was administered without complication. Low dose optimization technique was utilized for this CT exam including automated exposure control and adjustment of the mA and/or kV according to patient size. COMPARISON: 8/4/2012 FINDINGS: Lower chest: Small right and trace left pleural effusions and associated atelectasis. Cardiomegaly. Right atrial  enlargement. Liver: Nutmeg liver. No hepatic mass lesions are detected. No intrahepatic biliary dilatation. Gallbladder: No mural thickening. No radiopaque gallstones. Common bile duct: Nondilated. Pancreas: Atrophic. No pancreatic mass lesions are detected. Spleen: No mass. Adrenals: No mass. Kidneys: No suspicious mass lesions. No hydronephrosis. Stomach, small bowel, colon: No bowel wall thickening or obstruction. Peritoneal cavity: No ascites. Lymph nodes: No enlarged nodes by size criteria. Aorta: No aneurysm. Atherosclerosis. Pelvic organs: Trabeculated bladder wall and multiple bladder diverticula. Prostatic calcifications. Musculoskeletal structures: Osteopenia. Spondylosis.     1.  No acute inflammatory process in the abdomen or pelvis. 2.  Heterogeneous perfusion of the liver, most likely secondary to right heart dysfunction. 3.  Cardiomegaly and right atrial enlargement. 4.  Small right and trace left pleural effusions and associated atelectasis. 5.  Trabeculated bladder wall and multiple bladder diverticula, related to outlet obstruction.    CT-HEAD W/O    Result Date: 4/30/2021 4/30/2021 9:57 AM HISTORY/REASON FOR EXAM:  Altered mental status TECHNIQUE/EXAM DESCRIPTION AND NUMBER OF VIEWS:  CT scan of the head without contrast. Contiguous 5 mm axial sections were obtained from the skull base through the vertex. Up to date radiation dose reduction adjustments have been utilized to meet ALARA standards for radiation dose reduction. COMPARISON: 6/6/2020 FINDINGS:     No acute hemorrhage, mass-effect, or midline shift.   There is diffuse atrophy.   Periventricular white matter changes are consistent with chronic small vessel disease. There is encephalomalacia in the left temporal lobe. There is encephalomalacia in the right frontal lobe.  Ventricles and cisterns are patent. No ischemia or intracranial mass is identified. The paranasal sinuses and mastoid air cells are clear.     1.  No acute intracranial  process. 2. Diffuse atrophy and periventricular white matter changes consistent with chronic small vessel disease. Encephalomalacia in the right frontal and left temporal lobes    DX-CHEST-PORTABLE (1 VIEW)    Result Date: 4/30/2021 4/30/2021 10:23 AM HISTORY/REASON FOR EXAM:  Sepsis; sepsis. TECHNIQUE/EXAM DESCRIPTION AND NUMBER OF VIEWS: Single portable view of the chest. COMPARISON: 6/6/2020 FINDINGS: Cardiomediastinal silhouette is stable. Aortic calcified atherosclerotic plaque. Mild interstitial prominence diffusely could represent vascular congestion and/or chronic changes. Hazy left basilar opacity could represent atelectasis, scarring and/or pneumonitis. No significant pleural effusion or pneumothorax. Generalized osteopenia.     Left basilar opacity which could represent atelectasis and/or pneumonitis. Mild interstitial opacities most likely represent chronic changes and/or mild vascular congestion.    [id number]    Result Date: 5/1/2021 5/1/2021 3:00 PM HISTORY/REASON FOR EXAM:  Jaw Pain. TECHNIQUE/EXAM DESCRIPTION AND NUMBER OF VIEWS:  1 view(s) of the mandible. COMPARISON:  None. FINDINGS: Poor dentition, with multiple absent teeth. Multiple dental fillings. Periapical lucency is seen involving one of the left mandibular premolars. No mandibular fracture.     1.  Poor dentition with multiple absent teeth. 2.  Periapical lucency is seen involving one of the left mandibular premolars. 3.  No mandibular fracture.      Micro:  Results     Procedure Component Value Units Date/Time    BLOOD CULTURE [[id number]]  (Abnormal) Collected: 04/30/21 0940    Order Status: Completed Specimen: Blood from Peripheral Updated: 05/02/21 0803     Significant Indicator POS     Source BLD     Site PERIPHERAL     Culture Result Growth detected by Bactec instrument. 04/30/2021  21:11      -  Streptococcus gallolyticus  See previous culture for sensitivity report.      Narrative:      CALL  Luna  183 tel.  "[phone],  CALLED  183 tel. [phone] 04/30/2021, 21:11, RB PERF. RESULTS CALLED TO: RN  [phone]  Per Hospital Policy: Only change Specimen Src: to \"Line\" if  specified by physician order.  Right AC    URINE CULTURE(NEW) [[id number]] Collected: 04/30/21 1655    Order Status: Completed Specimen: Urine Updated: 05/02/21 0741     Significant Indicator NEG     Source UR     Site -     Culture Result No growth at 48 hours.    Narrative:      Indication for culture:->Emergency Room Patient  Indication for culture:->Emergency Room Patient    BLOOD CULTURE [[id number]] Collected: 05/02/21 0344    Order Status: Completed Specimen: Blood from Peripheral Updated: 05/02/21 0416    Narrative:      Per Hospital Policy: Only change Specimen Src: to \"Line\" if  specified by physician order.    BLOOD CULTURE [[id number]] Collected: 05/02/21 0343    Order Status: Completed Specimen: Blood from Peripheral Updated: 05/02/21 0416    Narrative:      Per Hospital Policy: Only change Specimen Src: to \"Line\" if  specified by physician order.    BLOOD CULTURE [[id number]]  (Abnormal) Collected: 04/30/21 0950    Order Status: Completed Specimen: Blood from Peripheral Updated: 05/01/21 1057     Significant Indicator POS     Source BLD     Site PERIPHERAL     Culture Result Growth detected by Bactec instrument. 04/30/2021  21:08      Streptococcus species  Streptococcus gallolyticus  Susceptibilities in progress      Narrative:      CALL  Luna  183 tel. [phone],  CALLED  183 tel. [phone] 04/30/2021, 21:11, RB PERF. RESULTS CALLED TO: RN  [phone]  Per Hospital Policy: Only change Specimen Src: to \"Line\" if  specified by physician order.  Right AC    CoV-2 and Flu A/B by PCR (24 hour In-House): Collect NP swab in [facility] [[id number]] Collected: 04/30/21 1045    Order Status: Completed Specimen: Respirate from Nasopharyngeal Updated: 04/30/21 2001     Influenza virus A RNA Negative     Influenza virus B, PCR Negative     SARS-CoV-2 by PCR NotDetected " "    Comment: PATIENTS: Important information regarding your results and instructions can  be found at [email or web address]   \"After your  Covid-19 Test\"  [facility] providers: PLEASE REFER TO DE-ESCALATION AND RETESTING PROTOCOL  on [email or web address]  **The Roche SARS-CoV-2 RT-PCR test has been made available for use under the  Emergency Use Authorization (EUA) only.          SARS-CoV-2 Source NP Swab    Narrative:      Have you been in close contact with a person who is suspected  or known to be positive for COVID-19 within the last 30 days  (e.g. last seen that person < 30 days ago)->No    URINALYSIS [[id number]]  (Abnormal) Collected: 04/30/21 1655    Order Status: Completed Specimen: Urine Updated: 04/30/21 1745     Color Yellow     Character Cloudy     Specific Gravity 1.014     Ph 6.5     Glucose Negative mg/dL      Ketones Negative mg/dL      Protein Negative mg/dL      Bilirubin Negative     Urobilinogen, Urine 0.2     Nitrite Positive     Leukocyte Esterase Large     Occult Blood Small     Micro Urine Req Microscopic    Narrative:      Indication for culture:->Emergency Room Patient    Influenza By PCR, A/B [[id number]] Collected: 04/30/21 0000    Order Status: Canceled Specimen: Respirate from Nasopharyngeal           Assessment:  Alistair Tavares is a 89 y.o. male with known history of CAD, paroxysmal A. fib and CVA on Xarelto, severe aortic stenosis deemed not a surgical candidate given frailty, reported history of recurrent UTIs, presented on 4/30 with fevers and chills for the prior few days along with generalized weakness and body aches, found to have Strep gallolyticus bacteremia     Pertinent Diagnoses:  Sepsis  Strep gallolyticus bacteremia  Asymptomatic bacteriuria  Severe aortic stenosis, deemed not a surgical candidate  Paroxysmal A. Fib  Chronic anticoagulation   CAD  History of CVA    Plan:  -Continue IV Unasyn 3 g every 6 hours.  White count improved today  -Follow " pending repeat blood cultures x2 from 4/2  -CT abdomen and pelvis with no abscesses  -Strep gallolyticus (formerly bovis) is a GI organism, associated with colon cancer.  Recommend colonoscopy  -Agree with TTE, pending    Discussed with internal medicine, Dr. Mohamud    ID will follow.  Please call with questions.

## 2021-05-02 NOTE — PROGRESS NOTES
IV Iron Per Pharmacy Note    Patient Lean Body Weight:  65 kg (actual body weight)  Reason for Iron Replacement: Iron Deficiency Anemia    Lab Results   Component Value Date/Time    WBC 14.4 (H) 05/02/2021 03:44 AM    RBC 3.31 (L) 05/02/2021 03:44 AM    HEMOGLOBIN 7.8 (L) 05/02/2021 03:44 AM    HEMATOCRIT 25.4 (L) 05/02/2021 03:44 AM    MCV 76.7 (L) 05/02/2021 03:44 AM    MCH 23.6 (L) 05/02/2021 03:44 AM    MCHC 30.7 (L) 05/02/2021 03:44 AM    MPV 9.7 05/02/2021 03:44 AM       Recent Labs     05/01/21  1725   FERRITIN 23.7   IRON 15*         Recent Labs     05/02/21  0344   CREATININE 0.74          Assessment/Plan:  1. IV Iron Indicated.   2. Following diphenhydramine/acetaminophen premeds, administer Iron Dextran 25 mg IV test dose over 30 minutes.  3. If no reaction (Anaphylaxis, Hypotension/Hypertension, N/V/D, Chest pain/Back Pain, Urticaria/Pruritis) in the next hour, proceed to full dose.   4. Full dose: Iron Dextran 1775 mg IV over 4 hours. Continue to monitor for delayed ADR including: Arthralgia/myalgia, Headache/backache, chills/dizziness/malaise, moderate to high fever and n/v.      Maribeth Doe, PharmD

## 2021-05-02 NOTE — PROGRESS NOTES
Patient found laying upside down in bed with bed alarm going off, and all medical equipment removed by patient (tele box and NC). When trying to reapply medical equipment patient became agitated and broke NC. Bilateral wrist restrains applied and MD James called for updates and orders.     MD placed orders for Bilateral wrist restrains and 4 side rails.

## 2021-05-02 NOTE — CARE PLAN
Problem: Communication  Goal: The ability to communicate needs accurately and effectively will improve  Outcome: PROGRESSING AS EXPECTED  Intervention: Reorient patient to environment as needed  Note: Patient has been reoriented to environment though out the shift      Problem: Safety  Goal: Will remain free from falls  Outcome: PROGRESSING AS EXPECTED  Intervention: Assess risk factors for falls  Note: Patient has been assessed for fall risks and fall precautions have been put in place.

## 2021-05-02 NOTE — PROGRESS NOTES
[facility] Medicine Daily Progress Note    Date of Service  5/2/2021    Chief Complaint  89 y.o. male admitted 4/30/2021 with fever, generalized body aches, and generalized weakness.    Hospital Course  This is a 89 years old male who has past medical history of coronary artery disease, paroxysmal A. fib and stroke on Xarelto, history of severe aortic stenosis deemed not a candidate for intervention given patient frailty and lack of ability to recover from such a procedure, and history of recurrent UTI who has been experiencing fever and chills over the last few days.  Also patient has been feeling weak and have generalized body aches.  Presented to the hospital for further evaluation.  In ER noted to be afebrile.  Hemodynamically stable.  Blood work noted to have leukocytosis.  Patient does not report urinary symptoms.  However, per his wife he is usually asymptomatic with his history of previous UTI's.  UA was positive.  Started on antibiotics.  Chest x-ray showed left basilar atelectasis with no infiltrate or consolidation.  Blood culture came back positive for Streptococcus species.  ID consulted.  Interval Problem Update  Resting comfortably.  Feels tired.  Easily awakens.  Afebrile.  Hemodynamically stable.  No acute distress noted.  No issues overnight per staff.  5/2: Has been afebrile.  Hemodynamically stable.  Feels better this morning.  More awake and interactive.  Sundowning noted last night requiring physical restraints.  CT abdomen pelvis done yesterday with no acute findings.  Pending culture sensitivities.  Repeat cultures ordered yesterday.  Echocardiogram pending.  PT/OT recommending SNF placement.  Consultants/Specialty  ID    Code Status  Full Code    Disposition  Pending SNF acceptance.    Review of Systems  Review of Systems   Constitutional: Negative for chills, fever and malaise/fatigue.   HENT: Negative for hearing loss and tinnitus.    Eyes: Negative for blurred vision and double vision.    Respiratory: Negative for cough and hemoptysis.    Cardiovascular: Negative for chest pain and palpitations.   Gastrointestinal: Negative for heartburn and nausea.   Genitourinary: Negative for dysuria, frequency and urgency.   Musculoskeletal: Negative for myalgias and neck pain.   Skin: Negative for rash.   Neurological: Negative for dizziness, tingling and headaches.   Psychiatric/Behavioral: Positive for memory loss. Negative for depression, substance abuse and suicidal ideas.        Physical Exam  Temp:  [36.3 °C (97.4 °F)-37.3 °C (99.1 °F)] 37.1 °C (98.7 °F)  Pulse:  [] 89  Resp:  [16] 16  BP: (113-151)/() 143/102  SpO2:  [92 %-98 %] 95 %    Physical Exam  Constitutional:       General: He is not in acute distress.  HENT:      Head: Normocephalic and atraumatic.      Mouth/Throat:      Mouth: Mucous membranes are dry.   Eyes:      Extraocular Movements: Extraocular movements intact.      Pupils: Pupils are equal, round, and reactive to light.   Cardiovascular:      Rate and Rhythm: Normal rate and regular rhythm.      Heart sounds: No friction rub. No gallop.    Pulmonary:      Effort: Pulmonary effort is normal. No respiratory distress.   Abdominal:      General: There is no distension.      Palpations: Abdomen is soft.   Musculoskeletal:         General: No swelling or tenderness.   Skin:     General: Skin is dry.   Neurological:      General: No focal deficit present.      Mental Status: He is alert and oriented to person, place, and time.   Psychiatric:         Mood and Affect: Mood normal.         Fluids    Intake/Output Summary (Last 24 hours) at 5/2/2021 1152  Last data filed at 5/2/2021 1000  Gross per 24 hour   Intake 440 ml   Output 1880 ml   Net -1440 ml       Laboratory  Recent Labs     04/30/21  0915 05/01/21  0857 05/02/21  0344   WBC 15.4* 17.6* 14.4*   RBC 3.62* 3.19* 3.31*   HEMOGLOBIN 8.4* 7.6* 7.8*   HEMATOCRIT 27.9* 24.3* 25.4*   MCV 77.1* 76.2* 76.7*   MCH 23.2* 23.8* 23.6*    MCHC 30.1* 31.3* 30.7*   RDW 47.0 45.5 47.6   PLATELETCT 323 261 291   MPV 9.9 9.5 9.7     Recent Labs     04/30/21  0915 05/01/21  0857 05/02/21  0344   SODIUM 130* 134* 139   POTASSIUM 5.1 3.9 3.7   CHLORIDE 100 102 104   CO2 20 25 25   GLUCOSE 112* 100* 103*   BUN 18 17 18   CREATININE 0.82 0.93 0.74   CALCIUM 8.9 8.7 8.6                   Imaging  CT-ABDOMEN-PELVIS WITH   Final Result      1.  No acute inflammatory process in the abdomen or pelvis.   2.  Heterogeneous perfusion of the liver, most likely secondary to right heart dysfunction.   3.  Cardiomegaly and right atrial enlargement.   4.  Small right and trace left pleural effusions and associated atelectasis.   5.  Trabeculated bladder wall and multiple bladder diverticula, related to outlet obstruction.      [id number]   Final Result      1.  Poor dentition with multiple absent teeth.   2.  Periapical lucency is seen involving one of the left mandibular premolars.   3.  No mandibular fracture.      DX-CHEST-PORTABLE (1 VIEW)   Final Result      Left basilar opacity which could represent atelectasis and/or pneumonitis.      Mild interstitial opacities most likely represent chronic changes and/or mild vascular congestion.      CT-HEAD W/O   Final Result         1.  No acute intracranial process.      2. Diffuse atrophy and periventricular white matter changes consistent with chronic small vessel disease. Encephalomalacia in the right frontal and left temporal lobes      EC-ECHOCARDIOGRAM COMPLETE W/O CONT    (Results Pending)        Assessment/Plan  * Recurrent UTI- (present on admission)  Assessment & Plan  With no fevers this morning  Suspect urinary source  Follow-up UA, continue IV antibiotics  Follow labs    Bacteremia  Assessment & Plan  Blood cultures current Streptococcus gallolyticus 2/2  Awaiting sensitivity.  On Zosyn for now.  Echocardiogram ordered to rule out endocarditis.  ID consulted   5/2: CT abdomen pelvis negative for any  abscess formation or masses.  Pending sensitivity.        Sepsis ([facility])  Assessment & Plan  This is Sepsis Present on admission  SIRS criteria identified on my evaluation include: Fever, with temperature greater than 101 deg F and Tachycardia, with heart rate greater than 90 BPM  Source is either urinary or lungs  Suspected onset of infection (date and time) a.m. 4/30/2021  Sepsis protocol initiated  Fluid resuscitation ordered per protocol  IV antibiotics as appropriate for source of sepsis  While organ dysfunction may be noted elsewhere in this problem list or in the chart, degree of organ dysfunction does not meet CMS criteria for severe sepsis          PAF (paroxysmal atrial fibrillation) (HCC)- (present on admission)  Assessment & Plan  Rate controlled, continue Xarelto    Lactic acidosis- (present on admission)  Assessment & Plan  Likely secondary to above.  Continue with gentle IV hydration.  Trend and monitor.    Microcytic anemia- (present on admission)  Assessment & Plan  Low iron level and saturation.  Start replacement.  Monitor H&H.    History of stroke- (present on admission)  Assessment & Plan  With some residual speech deficits, consider aspiration as etiology as well, follow-up chest x-ray  PT OT to be ordered       VTE prophylaxis: Xarleto

## 2021-05-02 NOTE — PROGRESS NOTES
Assumed care at 0700. Bedside report received from Maryjo. Patient's chart and MAR reviewed. Pt sleeping at this time. White board updated. Call light, phone and personal belongings within reach.

## 2021-05-02 NOTE — CARE PLAN
Pt educated on the importance fall prevention methods, such as treaded sock and the bed alarm. Pt's wife stated they will use the call light prior to any attempts of ambulation. Ambulatory ability assessed, treaded socks in place, bed locked and in low position, frequent trips to bathroom offered, and call light and phone within reach.

## 2021-05-03 ENCOUNTER — PATIENT OUTREACH (OUTPATIENT)
Dept: HEALTH INFORMATION MANAGEMENT | Facility: OTHER | Age: 86
End: 2021-05-03

## 2021-05-03 ENCOUNTER — APPOINTMENT (OUTPATIENT)
Dept: CARDIOLOGY | Facility: MEDICAL CENTER | Age: 86
DRG: 872 | End: 2021-05-03
Attending: FAMILY MEDICINE
Payer: MEDICARE

## 2021-05-03 PROBLEM — T78.40XA ALLERGIC REACTION: Status: ACTIVE | Noted: 2021-05-03

## 2021-05-03 PROBLEM — F05 SUNDOWNING: Status: ACTIVE | Noted: 2021-05-03

## 2021-05-03 LAB
ALBUMIN SERPL BCP-MCNC: 3.3 G/DL (ref 3.2–4.9)
ALBUMIN/GLOB SERPL: 1.1 G/DL
ALP SERPL-CCNC: 126 U/L (ref 30–99)
ALT SERPL-CCNC: 40 U/L (ref 2–50)
ANION GAP SERPL CALC-SCNC: 5 MMOL/L (ref 7–16)
AST SERPL-CCNC: 51 U/L (ref 12–45)
BASOPHILS # BLD AUTO: 0.2 % (ref 0–1.8)
BASOPHILS # BLD: 0.02 K/UL (ref 0–0.12)
BILIRUB SERPL-MCNC: 0.6 MG/DL (ref 0.1–1.5)
BUN SERPL-MCNC: 19 MG/DL (ref 8–22)
CALCIUM SERPL-MCNC: 8.5 MG/DL (ref 8.5–10.5)
CHLORIDE SERPL-SCNC: 102 MMOL/L (ref 96–112)
CO2 SERPL-SCNC: 25 MMOL/L (ref 20–33)
CREAT SERPL-MCNC: 0.84 MG/DL (ref 0.5–1.4)
EOSINOPHIL # BLD AUTO: 0.27 K/UL (ref 0–0.51)
EOSINOPHIL NFR BLD: 2.8 % (ref 0–6.9)
ERYTHROCYTE [DISTWIDTH] IN BLOOD BY AUTOMATED COUNT: 45.8 FL (ref 35.9–50)
GLOBULIN SER CALC-MCNC: 3.1 G/DL (ref 1.9–3.5)
GLUCOSE SERPL-MCNC: 114 MG/DL (ref 65–99)
HCT VFR BLD AUTO: 24.9 % (ref 42–52)
HGB BLD-MCNC: 7.6 G/DL (ref 14–18)
IMM GRANULOCYTES # BLD AUTO: 0.04 K/UL (ref 0–0.11)
IMM GRANULOCYTES NFR BLD AUTO: 0.4 % (ref 0–0.9)
LACTATE BLD-SCNC: 2.1 MMOL/L (ref 0.5–2)
LYMPHOCYTES # BLD AUTO: 0.64 K/UL (ref 1–4.8)
LYMPHOCYTES NFR BLD: 6.6 % (ref 22–41)
MCH RBC QN AUTO: 23.5 PG (ref 27–33)
MCHC RBC AUTO-ENTMCNC: 30.5 G/DL (ref 33.7–35.3)
MCV RBC AUTO: 76.9 FL (ref 81.4–97.8)
MONOCYTES # BLD AUTO: 0.87 K/UL (ref 0–0.85)
MONOCYTES NFR BLD AUTO: 9 % (ref 0–13.4)
NEUTROPHILS # BLD AUTO: 7.86 K/UL (ref 1.82–7.42)
NEUTROPHILS NFR BLD: 81 % (ref 44–72)
NRBC # BLD AUTO: 0 K/UL
NRBC BLD-RTO: 0 /100 WBC
PLATELET # BLD AUTO: 288 K/UL (ref 164–446)
PMV BLD AUTO: 9.5 FL (ref 9–12.9)
POTASSIUM SERPL-SCNC: 3.4 MMOL/L (ref 3.6–5.5)
PROT SERPL-MCNC: 6.4 G/DL (ref 6–8.2)
RBC # BLD AUTO: 3.24 M/UL (ref 4.7–6.1)
SODIUM SERPL-SCNC: 132 MMOL/L (ref 135–145)
WBC # BLD AUTO: 9.7 K/UL (ref 4.8–10.8)

## 2021-05-03 PROCEDURE — 99233 SBSQ HOSP IP/OBS HIGH 50: CPT | Performed by: INTERNAL MEDICINE

## 2021-05-03 PROCEDURE — 99232 SBSQ HOSP IP/OBS MODERATE 35: CPT | Performed by: FAMILY MEDICINE

## 2021-05-03 PROCEDURE — 700102 HCHG RX REV CODE 250 W/ 637 OVERRIDE(OP): Performed by: HOSPITALIST

## 2021-05-03 PROCEDURE — 700102 HCHG RX REV CODE 250 W/ 637 OVERRIDE(OP): Performed by: FAMILY MEDICINE

## 2021-05-03 PROCEDURE — 36415 COLL VENOUS BLD VENIPUNCTURE: CPT

## 2021-05-03 PROCEDURE — A9270 NON-COVERED ITEM OR SERVICE: HCPCS | Performed by: HOSPITALIST

## 2021-05-03 PROCEDURE — 93325 DOPPLER ECHO COLOR FLOW MAPG: CPT

## 2021-05-03 PROCEDURE — 93325 DOPPLER ECHO COLOR FLOW MAPG: CPT | Mod: 26 | Performed by: INTERNAL MEDICINE

## 2021-05-03 PROCEDURE — 83605 ASSAY OF LACTIC ACID: CPT

## 2021-05-03 PROCEDURE — 700111 HCHG RX REV CODE 636 W/ 250 OVERRIDE (IP): Performed by: STUDENT IN AN ORGANIZED HEALTH CARE EDUCATION/TRAINING PROGRAM

## 2021-05-03 PROCEDURE — 700101 HCHG RX REV CODE 250: Performed by: FAMILY MEDICINE

## 2021-05-03 PROCEDURE — 700111 HCHG RX REV CODE 636 W/ 250 OVERRIDE (IP): Performed by: FAMILY MEDICINE

## 2021-05-03 PROCEDURE — 770020 HCHG ROOM/CARE - TELE (206)

## 2021-05-03 PROCEDURE — 700105 HCHG RX REV CODE 258: Performed by: FAMILY MEDICINE

## 2021-05-03 PROCEDURE — A9270 NON-COVERED ITEM OR SERVICE: HCPCS | Performed by: FAMILY MEDICINE

## 2021-05-03 PROCEDURE — 80053 COMPREHEN METABOLIC PANEL: CPT

## 2021-05-03 PROCEDURE — 93308 TTE F-UP OR LMTD: CPT | Mod: 26 | Performed by: INTERNAL MEDICINE

## 2021-05-03 PROCEDURE — 85025 COMPLETE CBC W/AUTO DIFF WBC: CPT

## 2021-05-03 RX ORDER — AMOXICILLIN 250 MG
1 CAPSULE ORAL
Status: DISCONTINUED | OUTPATIENT
Start: 2021-05-03 | End: 2021-05-07 | Stop reason: HOSPADM

## 2021-05-03 RX ORDER — POTASSIUM CHLORIDE 20 MEQ/1
40 TABLET, EXTENDED RELEASE ORAL ONCE
Status: COMPLETED | OUTPATIENT
Start: 2021-05-03 | End: 2021-05-03

## 2021-05-03 RX ORDER — POLYETHYLENE GLYCOL 3350 17 G/17G
1 POWDER, FOR SOLUTION ORAL
Status: DISCONTINUED | OUTPATIENT
Start: 2021-05-03 | End: 2021-05-04

## 2021-05-03 RX ORDER — SODIUM CHLORIDE 9 MG/ML
INJECTION, SOLUTION INTRAVENOUS CONTINUOUS
Status: DISCONTINUED | OUTPATIENT
Start: 2021-05-03 | End: 2021-05-07 | Stop reason: HOSPADM

## 2021-05-03 RX ORDER — QUETIAPINE FUMARATE 25 MG/1
12.5 TABLET, FILM COATED ORAL NIGHTLY
Status: DISCONTINUED | OUTPATIENT
Start: 2021-05-03 | End: 2021-05-07 | Stop reason: HOSPADM

## 2021-05-03 RX ORDER — ACETAMINOPHEN 325 MG/1
650 TABLET ORAL ONCE
Status: COMPLETED | OUTPATIENT
Start: 2021-05-03 | End: 2021-05-03

## 2021-05-03 RX ORDER — DIPHENHYDRAMINE HCL 25 MG
25 TABLET ORAL ONCE
Status: COMPLETED | OUTPATIENT
Start: 2021-05-03 | End: 2021-05-03

## 2021-05-03 RX ORDER — DIPHENHYDRAMINE HYDROCHLORIDE 50 MG/ML
25 INJECTION INTRAMUSCULAR; INTRAVENOUS ONCE
Status: COMPLETED | OUTPATIENT
Start: 2021-05-03 | End: 2021-05-03

## 2021-05-03 RX ADMIN — DIPHENHYDRAMINE HYDROCHLORIDE 25 MG: 25 TABLET ORAL at 15:58

## 2021-05-03 RX ADMIN — METRONIDAZOLE 500 MG: 500 INJECTION, SOLUTION INTRAVENOUS at 05:54

## 2021-05-03 RX ADMIN — RIVAROXABAN 15 MG: 15 TABLET, FILM COATED ORAL at 10:38

## 2021-05-03 RX ADMIN — SODIUM CHLORIDE 25 MG: 9 INJECTION, SOLUTION INTRAVENOUS at 16:59

## 2021-05-03 RX ADMIN — CEFAZOLIN SODIUM 2 G: 2 INJECTION, SOLUTION INTRAVENOUS at 05:05

## 2021-05-03 RX ADMIN — METRONIDAZOLE 500 MG: 500 INJECTION, SOLUTION INTRAVENOUS at 14:51

## 2021-05-03 RX ADMIN — ACETAMINOPHEN 650 MG: 325 TABLET ORAL at 15:58

## 2021-05-03 RX ADMIN — METRONIDAZOLE 500 MG: 500 INJECTION, SOLUTION INTRAVENOUS at 23:26

## 2021-05-03 RX ADMIN — FINASTERIDE 5 MG: 5 TABLET, FILM COATED ORAL at 05:08

## 2021-05-03 RX ADMIN — POTASSIUM CHLORIDE 40 MEQ: 1500 TABLET, EXTENDED RELEASE ORAL at 10:38

## 2021-05-03 RX ADMIN — CEFAZOLIN SODIUM 2 G: 2 INJECTION, SOLUTION INTRAVENOUS at 13:44

## 2021-05-03 RX ADMIN — SODIUM CHLORIDE 1775 MG: 9 INJECTION, SOLUTION INTRAVENOUS at 18:28

## 2021-05-03 RX ADMIN — FAMOTIDINE 20 MG: 20 TABLET ORAL at 05:08

## 2021-05-03 RX ADMIN — SODIUM CHLORIDE: 9 INJECTION, SOLUTION INTRAVENOUS at 07:24

## 2021-05-03 RX ADMIN — LORAZEPAM 1 MG: 2 INJECTION INTRAMUSCULAR; INTRAVENOUS at 22:00

## 2021-05-03 RX ADMIN — QUETIAPINE FUMARATE 12.5 MG: 25 TABLET ORAL at 20:52

## 2021-05-03 RX ADMIN — CEFAZOLIN SODIUM 2 G: 2 INJECTION, SOLUTION INTRAVENOUS at 23:26

## 2021-05-03 SDOH — ECONOMIC STABILITY: FOOD INSECURITY: WITHIN THE PAST 12 MONTHS, THE FOOD YOU BOUGHT JUST DIDN'T LAST AND YOU DIDN'T HAVE MONEY TO GET MORE.: NEVER TRUE

## 2021-05-03 SDOH — ECONOMIC STABILITY: FOOD INSECURITY: WITHIN THE PAST 12 MONTHS, YOU WORRIED THAT YOUR FOOD WOULD RUN OUT BEFORE YOU GOT MONEY TO BUY MORE.: NEVER TRUE

## 2021-05-03 SDOH — ECONOMIC STABILITY: TRANSPORTATION INSECURITY
IN THE PAST 12 MONTHS, HAS THE LACK OF TRANSPORTATION KEPT YOU FROM MEDICAL APPOINTMENTS OR FROM GETTING MEDICATIONS?: NO

## 2021-05-03 SDOH — ECONOMIC STABILITY: TRANSPORTATION INSECURITY
IN THE PAST 12 MONTHS, HAS LACK OF TRANSPORTATION KEPT YOU FROM MEETINGS, WORK, OR FROM GETTING THINGS NEEDED FOR DAILY LIVING?: NO

## 2021-05-03 ASSESSMENT — ENCOUNTER SYMPTOMS
LOSS OF CONSCIOUSNESS: 0
COUGH: 0
NECK PAIN: 0
BLURRED VISION: 0
CHILLS: 0
ABDOMINAL PAIN: 0
WEAKNESS: 1
HEADACHES: 0
SHORTNESS OF BREATH: 1
HEARTBURN: 0
CONSTIPATION: 0
CHILLS: 1
NAUSEA: 0
BLOOD IN STOOL: 0
DIZZINESS: 0
FEVER: 0
DIARRHEA: 0
PALPITATIONS: 0
FEVER: 1
DEPRESSION: 0
MEMORY LOSS: 1
SPUTUM PRODUCTION: 0
DOUBLE VISION: 0
MUSCULOSKELETAL NEGATIVE: 1
MYALGIAS: 0
SEIZURES: 0
MEMORY LOSS: 0
HEMOPTYSIS: 0

## 2021-05-03 ASSESSMENT — SOCIAL DETERMINANTS OF HEALTH (SDOH): HOW HARD IS IT FOR YOU TO PAY FOR THE VERY BASICS LIKE FOOD, HOUSING, MEDICAL CARE, AND HEATING?: NOT HARD AT ALL

## 2021-05-03 NOTE — DISCHARGE PLANNING
Anticipated Discharge Disposition: home health vs SNF    Action: RN CM met with patient and his spouse, Rekha, at bedside. Patient is not fully oriented. RN CM discussed current recommendations by PT/OT for snf placement. Rekha gave verbal consent to send referrals to [facility] and [facility]. Choice faxed to DPA.   She is not completely agreeable to patient going to snf at this time as she may want to take patient home with home health depending how he does in the hospital over the next day or two. Patient is not medically cleared with ID and GI following.  [facility] #[id number]    Barriers to Discharge: medical clearance, snf vs home health acceptance    Plan: Case coordination to f/u with snf referrals, f/u with spouse to determine if she wants snf vs home with home health

## 2021-05-03 NOTE — DISCHARGE PLANNING
Received Choice form at 1350  Agency/Facility Name: [facility]), [facility](2)  Referral sent per Choice form @ 1352    RN JOSI informed

## 2021-05-03 NOTE — CONSULTS
Gastroenterology Consult Note:    Deo Casarez M.D. with [facility]at[facility]  Date & Time note created:    5/3/2021   11:36 AM     Referring MD:  Dr. Renetta Mohamud  Patient ID:  Name:             Alistair Tavares   YOB: 1924  Age:                 89 y.o.  male   MRN:               [id number]                                                             Reason for Consult:      1. Streptococcus gallolyticus    History of Present Illness:    89 yo male PMH CAD, Paroxysmal Atrial fibrillation, CVA on Xarelto, severe aortic stenosis-not a surgical candidate, urinary retention requiring self catheterizations, recurrent UTI's who was admitted to the hospital 4/30/31 with fevers, chills, generalized weakness, bodyaches for a few days prior to admission. Blood cultures: Strep. gallolyticus bacteremia. Initially started on Unasyn however developed a rash, currently on IV Cefazolin 2g every 8 hours and IV Metronidzaole 500mg every 8 hours. Patient speech is somewhat garbled but denies abdominal pain, changes in bowel habits, rectal bleeding. Recommendations for colonoscopy due to streptocococcus gallolyticus bacteremia which has a correlation with colon neoplasm. Patient has never had a colonoscopy.    CT scan abdomen/pelvi: 5/1/21: No acute inflammatory process in the abdomen or pelvis.  2.  Heterogeneous perfusion of the liver, most likely secondary to right heart dysfunction.  3.  Cardiomegaly and right atrial enlargement.  4.  Small right and trace left pleural effusions and associated atelectasis.  5.  Trabeculated bladder wall and multiple bladder diverticula, related to outlet obstruction.    Most of the information was obtained from his wife, Alicia who was present in the room    He had a TTE which was technically difficult study and incomplete information. Patients wife stated patient was agitated during the procedure. Infectious Disease recommended repeating the  TTE.    Patient was restarted on Xarelto this morning.    Review of Systems:      Review of Systems   Constitutional: Positive for chills, fever and malaise/fatigue.   HENT: Positive for hearing loss (very hard of hearing).    Respiratory: Positive for shortness of breath. Negative for cough and sputum production.    Cardiovascular: Negative for chest pain and palpitations.   Gastrointestinal: Negative for abdominal pain, blood in stool, constipation, diarrhea and melena.   Genitourinary: Negative.    Musculoskeletal: Negative.    Skin: Negative for itching and rash.   Neurological: Positive for weakness. Negative for seizures and loss of consciousness.   Psychiatric/Behavioral: Negative for depression, memory loss and suicidal ideas.             Physical Exam:  Vitals/ General Appearance:   Weight/BMI: Body mass index is 19.11 kg/m².    Vitals:    05/02/21 2000 05/02/21 2342 05/03/21 0457 05/03/21 0721   BP: 120/72  143/93 125/76   Pulse: 88  85 79   Resp: 17 20 18 18   Temp: 37.3 °C (99.2 °F) 36.6 °C (97.9 °F) 36.8 °C (98.2 °F) 36.6 °C (97.9 °F)   TempSrc: Temporal Temporal Temporal Temporal   SpO2: 92% 93% 92% 93%   Weight: 63.9 kg (140 lb 14 oz)      Height:         Oxygen Therapy:  Pulse Oximetry: 93 %, O2 (LPM): 2, O2 Delivery Device: Silicone Nasal Cannula    Physical Exam   Constitutional:   fraile appearing elderly male with somewhat garbled, incomprehensible speech. Hard of hearing   HENT:   Head: Normocephalic and atraumatic.   Eyes: Pupils are equal, round, and reactive to light. EOM are normal.   Cardiovascular: Normal rate. An irregular rhythm present.   Pulmonary/Chest: Effort normal and breath sounds normal.   Abdominal: Soft. Normal appearance. There is no abdominal tenderness.   Musculoskeletal:      Cervical back: Normal range of motion and neck supple.   Neurological: He is alert. He displays weakness.   Oriented to person    Skin: Skin is warm, dry and intact.   Psychiatric: Affect and judgment  normal.       Past Medical History:   Past Medical History:   Diagnosis Date   • Angina 2005    episode of atrial fibrillation   • Arrhythmia     History of afib, no longer on medication   • CAD (coronary artery disease)     paroximal atrial tachycardia   • CATARACT     asim iol   • Dental disorder     lower dentures   • Macular degeneration of both eyes    • Pneumonia 2008    Not hospitalized for it   • Renal disorder 8/4/2012    pyelonephritis   • Stroke (HCC) 2015    pt reports no residual weakness   • Unspecified urinary incontinence     dribbles       Past Surgical History:  Past Surgical History:   Procedure Laterality Date   • CYSTOSCOPY  6/13/2016    Procedure: CYSTOSCOPY 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;  Surgeon: Bucky Bustamante M.D.;  Location: SURGERY [facility];  Service:    • CATARACT PHACO WITH IOL  10/28/2009    Performed by PAULA COE at SURGERY SAME DAY [facility] ORS   • CATARACT PHACO WITH IOL  10/14/2009    Performed by PAULA COE at SURGERY SAME DAY [facility] ORS   • OTHER ORTHOPEDIC SURGERY      [facility]       Hospital Medications:    Current Facility-Administered Medications:   •  NS infusion, , Intravenous, Continuous, Renetta Mohamud M.D., Last Rate: 75 mL/hr at 05/03/21 0724, New Bag at 05/03/21 0724  •  rivaroxaban (XARELTO) tablet 15 mg, 15 mg, Oral, DAILY, Renetta Mohamud M.D., 15 mg at 05/03/21 1038  •  acetaminophen (Tylenol) tablet 650 mg, 650 mg, Oral, Once, Renetta Mohamud M.D.  •  iron dextran complex (INFED) 1,775 mg in  mL IVPB, 1,775 mg, Intravenous, Once, Renetta Mohamud M.D.  •  iron dextran complex (INFED) 25 mg in NS 50 mL IVPB, 25 mg, Intravenous, Once, Renetta Mohamud M.D.  •  diphenhydrAMINE (BENADRYL) tablet/capsule 25 mg, 25 mg, Oral, Once **OR** diphenhydrAMINE (BENADRYL) injection 25 mg, 25 mg, Intravenous, Once, Renetta Mohamud M.D.  •  diphenhydrAMINE-zinc acetate (BENADRYL ITCH) topical cream, ,  Topical, TID PRN, Renetta Mohamud M.D., Given at 05/02/21 2317  •  ceFAZolin in dextrose (ANCEF) IVPB premix 2 g, 2 g, Intravenous, Q8HRS, Renetta Mohamud M.D., Stopped at 05/03/21 0535  •  metroNIDAZOLE (FLAGYL) IVPB 500 mg, 500 mg, Intravenous, Q8HRS, Renetta Mohamud M.D., Stopped at 05/03/21 0654  •  famotidine (PEPCID) tablet 20 mg, 20 mg, Oral, DAILY, Renetta Mohamud M.D., 20 mg at 05/03/21 0508  •  haloperidol lactate (HALDOL) injection 1 mg, 1 mg, Intravenous, Q4HRS PRN, Jojo James M.D., 1 mg at 05/02/21 2248  •  LORazepam (ATIVAN) injection 1 mg, 1 mg, Intravenous, Q4HRS PRN, Jojo James M.D.  •  finasteride (PROSCAR) tablet 5 mg, 5 mg, Oral, QAM, Bryce Anderson M.D., 5 mg at 05/03/21 0508  •  acetaminophen (Tylenol) tablet 650 mg, 650 mg, Oral, Q6HRS PRN, Bryce Anderson M.D.  •  Notify provider if pain remains uncontrolled, , , CONTINUOUS **AND** Use the Numeric Rating Scale (NRS), Cortez-Baker Faces (WBF), or FLACC on regular floors and Critical-Care Pain Observation Tool (CPOT) on ICUs/Trauma to assess pain, , , CONTINUOUS **AND** Pulse Ox, , , CONTINUOUS **AND** Pharmacy Consult Request ...Pain Management Review 1 Each, 1 Each, Other, PHARMACY TO DOSE **AND** If patient difficult to arouse and/or has respiratory depression (respiratory rate of 10 or less), stop any opiates that are currently infusing and call a Rapid Response., , , CONTINUOUS, Bryce Anderson M.D.  •  oxyCODONE immediate-release (ROXICODONE) tablet 2.5 mg, 2.5 mg, Oral, Q3HRS PRN **OR** oxyCODONE immediate-release (ROXICODONE) tablet 5 mg, 5 mg, Oral, Q3HRS PRN **OR** HYDROmorphone (Dilaudid) injection 0.25 mg, 0.25 mg, Intravenous, Q3HRS PRN, Bryce Anderson M.D.  •  ondansetron (ZOFRAN) syringe/vial injection 4 mg, 4 mg, Intravenous, Q4HRS PRN, Bryce Anderson M.D.  •  ondansetron (ZOFRAN ODT) dispertab 4 mg, 4 mg, Oral, Q4HRS PRN, Bryce Anderson M.D.    Current Outpatient Medications:  Current  Facility-Administered Medications   Medication Dose Route Frequency Provider Last Rate Last Admin   • NS infusion   Intravenous Continuous Renetta Mohamud M.D. 75 mL/hr at 05/03/21 0724 New Bag at 05/03/21 0724   • rivaroxaban (XARELTO) tablet 15 mg  15 mg Oral DAILY Renetta Mohamud M.D.   15 mg at 05/03/21 1038   • acetaminophen (Tylenol) tablet 650 mg  650 mg Oral Once Renetta Mohamud M.D.       • iron dextran complex (INFED) 1,775 mg in  mL IVPB  1,775 mg Intravenous Once Renetta Mohamud M.D.       • iron dextran complex (INFED) 25 mg in NS 50 mL IVPB  25 mg Intravenous Once Renetta Mohamud M.D.       • diphenhydrAMINE (BENADRYL) tablet/capsule 25 mg  25 mg Oral Once Renetta Mohamud M.D.        Or   • diphenhydrAMINE (BENADRYL) injection 25 mg  25 mg Intravenous Once Renetta Mohamud M.D.       • diphenhydrAMINE-zinc acetate (BENADRYL ITCH) topical cream   Topical TID PRN Renetta Mohamud M.D.   Given at 05/02/21 2317   • ceFAZolin in dextrose (ANCEF) IVPB premix 2 g  2 g Intravenous Q8HRS Renetta Mohamud M.D.   Stopped at 05/03/21 0535   • metroNIDAZOLE (FLAGYL) IVPB 500 mg  500 mg Intravenous Q8HRS Renetta Mohamud M.D.   Stopped at 05/03/21 0654   • famotidine (PEPCID) tablet 20 mg  20 mg Oral DAILY Renetta Mohamud M.D.   20 mg at 05/03/21 0508   • haloperidol lactate (HALDOL) injection 1 mg  1 mg Intravenous Q4HRS PRN Jojo James M.D.   1 mg at 05/02/21 2248   • LORazepam (ATIVAN) injection 1 mg  1 mg Intravenous Q4HRS PRN Jojo James M.D.       • finasteride (PROSCAR) tablet 5 mg  5 mg Oral JAYME Anderson M.D.   5 mg at 05/03/21 0508   • acetaminophen (Tylenol) tablet 650 mg  650 mg Oral Q6HRS PRN Bryce Anderson M.D.       • Pharmacy Consult Request ...Pain Management Review 1 Each  1 Each Other PHARMACY TO DOSE Bryce Adnerson M.D.       • oxyCODONE immediate-release (ROXICODONE) tablet 2.5 mg  2.5 mg Oral Q3HRS PRN Bryce Anderson M.D.        Or   • oxyCODONE  "immediate-release (ROXICODONE) tablet 5 mg  5 mg Oral Q3HRS PRN Bryce Anderson M.D.        Or   • HYDROmorphone (Dilaudid) injection 0.25 mg  0.25 mg Intravenous Q3HRS PRN Bryce Anderson M.D.       • ondansetron (ZOFRAN) syringe/vial injection 4 mg  4 mg Intravenous Q4HRS PRN Bryce Anderson M.D.       • ondansetron (ZOFRAN ODT) dispertab 4 mg  4 mg Oral Q4HRS PRN Bryce Anderson M.D.           Medication Allergy:  Allergies   Allergen Reactions   • Bloodless Unspecified     Pt states he is willing to accept blood/blood products as \"a last resort\"   • Crestor [Rosuvastatin Calcium] Rash and Itching     Rash/itching all over body   • Eliquis [Apixaban] Rash           • Flomax [Tamsulosin] Unspecified     Pts wife states \"His BP goes really low and he fell down\"   • Heparin Hives, Rash and Itching     Pts wife states \"Rash all over body\".   • Lipitor [Atorvastatin] Rash     Pts wife states \"Rash all over body\".   • Macrobid [Kdc:Red Dye+Yellow Dye+Nitrofurantoin+Brilliant Blue Fcf] Nausea     Pt's wife states \"Makes pt weak and nausea\".   • Statins [Hmg-Coa-R Inhibitors] Rash and Itching     Rash/itching all over body   • Emcin Clear Unspecified         • Unasyn [Ampicillin-Sulbactam Sodium]      Patient developed a rash with Unasyn administration during this hospital stay   • Coufarin [Warfarin] Unspecified     Pts wife states \"He just does not like that drug\".       Family History:  Family History   Problem Relation Age of Onset   • Lung Disease Father         emphysima   • Arthritis Neg Hx        Social History:  Social History     Socioeconomic History   • Marital status:      Spouse name: Not on file   • Number of children: Not on file   • Years of education: Not on file   • Highest education level: Not on file   Occupational History   • Not on file   Tobacco Use   • Smoking status: Never Smoker   • Smokeless tobacco: Never Used   Substance and Sexual Activity   • Alcohol use: Yes     Alcohol/week: " 2.4 - 3.0 oz     Types: 3 Standard drinks or equivalent, 1 - 2 Cans of beer per week   • Drug use: No   • Sexual activity: Never     Partners: Female   Other Topics Concern   • Not on file   Social History Narrative   • Not on file     Social Determinants of Health     Financial Resource Strain:    • Difficulty of Paying Living Expenses:    Food Insecurity:    • Worried About Running Out of Food in the Last Year:    • Ran Out of Food in the Last Year:    Transportation Needs:    • Lack of Transportation (Medical):    • Lack of Transportation (Non-Medical):    Physical Activity:    • Days of Exercise per Week:    • Minutes of Exercise per Session:    Stress:    • Feeling of Stress :    Social Connections:    • Frequency of Communication with Friends and Family:    • Frequency of Social Gatherings with Friends and Family:    • Attends [employer] Services:    • Active Member of Clubs or Organizations:    • Attends Club or Organization Meetings:    • Marital Status:    Intimate Partner Violence:    • Fear of Current or Ex-Partner:    • Emotionally Abused:    • Physically Abused:    • Sexually Abused:          MDM (Data Review):     Records reviewed and summarized in current documentation    Lab Data Review:  Recent Results (from the past 24 hour(s))   LACTIC ACID    Collection Time: 05/02/21  3:10 PM   Result Value Ref Range    Lactic Acid 3.3 (H) 0.5 - 2.0 mmol/L   CBC WITH DIFFERENTIAL    Collection Time: 05/03/21  6:04 AM   Result Value Ref Range    WBC 9.7 4.8 - 10.8 K/uL    RBC 3.24 (L) 4.70 - 6.10 M/uL    Hemoglobin 7.6 (L) 14.0 - 18.0 g/dL    Hematocrit 24.9 (L) 42.0 - 52.0 %    MCV 76.9 (L) 81.4 - 97.8 fL    MCH 23.5 (L) 27.0 - 33.0 pg    MCHC 30.5 (L) 33.7 - 35.3 g/dL    RDW 45.8 35.9 - 50.0 fL    Platelet Count 288 164 - 446 K/uL    MPV 9.5 9.0 - 12.9 fL    Neutrophils-Polys 81.00 (H) 44.00 - 72.00 %    Lymphocytes 6.60 (L) 22.00 - 41.00 %    Monocytes 9.00 0.00 - 13.40 %    Eosinophils 2.80 0.00 - 6.90 %     Basophils 0.20 0.00 - 1.80 %    Immature Granulocytes 0.40 0.00 - 0.90 %    Nucleated RBC 0.00 /100 WBC    Neutrophils (Absolute) 7.86 (H) 1.82 - 7.42 K/uL    Lymphs (Absolute) 0.64 (L) 1.00 - 4.80 K/uL    Monos (Absolute) 0.87 (H) 0.00 - 0.85 K/uL    Eos (Absolute) 0.27 0.00 - 0.51 K/uL    Baso (Absolute) 0.02 0.00 - 0.12 K/uL    Immature Granulocytes (abs) 0.04 0.00 - 0.11 K/uL    NRBC (Absolute) 0.00 K/uL   Comp Metabolic Panel    Collection Time: 05/03/21  6:04 AM   Result Value Ref Range    Sodium 132 (L) 135 - 145 mmol/L    Potassium 3.4 (L) 3.6 - 5.5 mmol/L    Chloride 102 96 - 112 mmol/L    Co2 25 20 - 33 mmol/L    Anion Gap 5.0 (L) 7.0 - 16.0    Glucose 114 (H) 65 - 99 mg/dL    Bun 19 8 - 22 mg/dL    Creatinine 0.84 0.50 - 1.40 mg/dL    Calcium 8.5 8.5 - 10.5 mg/dL    AST(SGOT) 51 (H) 12 - 45 U/L    ALT(SGPT) 40 2 - 50 U/L    Alkaline Phosphatase 126 (H) 30 - 99 U/L    Total Bilirubin 0.6 0.1 - 1.5 mg/dL    Albumin 3.3 3.2 - 4.9 g/dL    Total Protein 6.4 6.0 - 8.2 g/dL    Globulin 3.1 1.9 - 3.5 g/dL    A-G Ratio 1.1 g/dL   ESTIMATED GFR    Collection Time: 05/03/21  6:04 AM   Result Value Ref Range    GFR If African American >60 >60 mL/min/1.73 m 2    GFR If Non African American >60 >60 mL/min/1.73 m 2   LACTIC ACID    Collection Time: 05/03/21 10:40 AM   Result Value Ref Range    Lactic Acid 2.1 (H) 0.5 - 2.0 mmol/L       Imaging/Procedures Review:      EC-ECHOCARDIOGRAM COMPLETE W/O CONT   Final Result      CT-ABDOMEN-PELVIS WITH   Final Result      1.  No acute inflammatory process in the abdomen or pelvis.   2.  Heterogeneous perfusion of the liver, most likely secondary to right heart dysfunction.   3.  Cardiomegaly and right atrial enlargement.   4.  Small right and trace left pleural effusions and associated atelectasis.   5.  Trabeculated bladder wall and multiple bladder diverticula, related to outlet obstruction.      [id number]   Final Result      1.  Poor dentition with multiple  absent teeth.   2.  Periapical lucency is seen involving one of the left mandibular premolars.   3.  No mandibular fracture.      DX-CHEST-PORTABLE (1 VIEW)   Final Result      Left basilar opacity which could represent atelectasis and/or pneumonitis.      Mild interstitial opacities most likely represent chronic changes and/or mild vascular congestion.      CT-HEAD W/O   Final Result         1.  No acute intracranial process.      2. Diffuse atrophy and periventricular white matter changes consistent with chronic small vessel disease. Encephalomalacia in the right frontal and left temporal lobes      EC-ECHOCARDIOGRAM COMPLETE W/O CONT    (Results Pending)        MDM (Assessment and Plan):     Patient Active Problem List    Diagnosis Date Noted   • Acute ischemic left MCA stroke ([facility]) 06/06/2020   • Right shoulder pain 08/11/2016   • Bacteremia 05/17/2016   • Sepsis ([facility]) 05/15/2016   • Hyponatremia 08/11/2016   • Normocytic anemia 08/10/2016   • PAF (paroxysmal atrial fibrillation) ([facility]) 04/21/2015   • History of stroke 04/08/2015   • BPH (benign prostatic hyperplasia) 10/19/2011   • Microcytic anemia 05/01/2021   • Lactic acidosis 05/01/2021   • Nonrheumatic aortic valve stenosis 06/08/2020   • 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 gallolyticus bacteremia on blood cultures x 2. Currently, on IV Cefazolin and Metrondiazole.  WBC improving 9.7 (14.4. on 5/2/21). CT scan abdomen/pelvis: no acute inflammatory process in the abdomen or pelvis. There is a correlation between Strep Gallolyticus bacteremia and colon neoplasm. Recommendations for colonoscopy. Needs a repeat TTE and restarted on Xarelto this morning.     ASSESSMENT/PLAN:  1. Strep gallolyticus bacteremia: Blood cultures  x 2 positive for Strep. Gallolyticus. Correlation between Step. Gallolyticus and colon neoplasm.   2. Sepsis: WBC improving. Afebrile  2. CVA; patient is currently on Xarelto  3. Paroxysmal atrial fibrillation  4. Recurrent UTI: history of urinary retention   5. Severe aortic stenosis: Initial TTE was technically difficult and incomplete. ID recommended repeating TTE     PLAN:  1. Barium enema study: if this is abnormal will proceed with colonoscopy. If the barium enema does not show any lesions would recommend not pursuing a colonoscopy due to age and multiple comorbidities.  2. Hold Xarelto: in case of needing a colonoscopy  3. Clear liquid diet  4. Continue IV antibiotics: appreciate ID recommendations  5. Recommend repeating TTE     Thank your for the opportunity to assist in the care of your patient.  Please call for any questions or concerns.    Deo Casarez M.D. with  [facility]ILL[facility] Morataya.

## 2021-05-03 NOTE — PROGRESS NOTES
Community Health Worker Intake  • Social determinates of health intake Complete.   • Identified barriers to Transportation.  • Contact information provided to Alistair Tavares   • Has PCP appointment scheduled for [facility]  • Accepted Meds-To-Beds.   • Inpatient assessment completed.    VINICIUS Mast met with pt bedside to introduce [facility] and [facility] programs. Pt states currently active with [facility]. Pt identifies barriers to transportation, CHW introduced [facility] [employer] [facility] and Access to Health Care. Pt identifies no other barriers to resources and expressed no other needs at this time.     Plan: CHW will refer pt to [facility]

## 2021-05-03 NOTE — PROGRESS NOTES
[facility] Medicine Daily Progress Note    Date of Service  5/3/2021    Chief Complaint  89 y.o. male admitted 4/30/2021 with fever, generalized body aches, and generalized weakness.    Hospital Course  This is a 89 years old male who has past medical history of coronary artery disease, paroxysmal A. fib and stroke on Xarelto, history of severe aortic stenosis deemed not a candidate for intervention given patient frailty and lack of ability to recover from such a procedure, and history of recurrent UTI who has been experiencing fever and chills over the last few days.  Also patient has been feeling weak and have generalized body aches.  Presented to the hospital for further evaluation.  In ER noted to be afebrile.  Hemodynamically stable.  Blood work noted to have leukocytosis.  Patient does not report urinary symptoms.  However, per his wife he is usually asymptomatic with his history of previous UTI's.  UA was positive.  Started on antibiotics.  Chest x-ray showed left basilar atelectasis with no infiltrate or consolidation.  Blood culture came back positive for Streptococcus species.  ID consulted.  Interval Problem Update  Resting comfortably.  Feels tired.  Easily awakens.  Afebrile.  Hemodynamically stable.  No acute distress noted.  No issues overnight per staff.  5/2: Has been afebrile.  Hemodynamically stable.  Feels better this morning.  More awake and interactive.  Sundowning noted last night requiring physical restraints.  CT abdomen pelvis done yesterday with no acute findings.  Pending culture sensitivities.  Repeat cultures ordered yesterday.  Echocardiogram pending.  PT/OT recommending SNF placement.  5/3: Resting in bed comfortable.  Afebrile.  Hemodynamically stable.  Experienced sundowning last night again required restraints.  Off restraints this morning.  ID recommending repeat echocardiogram as prior study was poor.  Also recommended GI consultation for colonoscopy as strep bovis associated with  colon cancer.  GI recommending barium enema and possibly colonoscopy if lesions need noted.  No acute distress noted.  Antibiotics yesterday switched to Ancef and Flagyl as patient developed allergic reaction to Unasyn.  Consultants/Specialty  ID    Code Status  Full Code    Disposition  Pending SNF acceptance.    Review of Systems  Review of Systems   Constitutional: Negative for chills and fever.   HENT: Negative for hearing loss and tinnitus.    Eyes: Negative for blurred vision and double vision.   Respiratory: Negative for cough and hemoptysis.    Cardiovascular: Negative for chest pain and palpitations.   Gastrointestinal: Negative for heartburn and nausea.   Genitourinary: Negative for dysuria and urgency.   Musculoskeletal: Negative for myalgias and neck pain.   Skin: Negative for rash.   Neurological: Negative for dizziness and headaches.   Psychiatric/Behavioral: Positive for memory loss. Negative for depression and suicidal ideas.        Physical Exam  Temp:  [36.6 °C (97.8 °F)-37.3 °C (99.2 °F)] 36.6 °C (97.8 °F)  Pulse:  [77-88] 83  Resp:  [17-20] 18  BP: (120-143)/(71-93) 121/79  SpO2:  [92 %-98 %] 98 %    Physical Exam  Constitutional:       General: He is not in acute distress.  HENT:      Head: Normocephalic and atraumatic.      Mouth/Throat:      Mouth: Mucous membranes are dry.   Eyes:      Extraocular Movements: Extraocular movements intact.      Pupils: Pupils are equal, round, and reactive to light.   Cardiovascular:      Rate and Rhythm: Normal rate and regular rhythm.      Heart sounds: No friction rub. No gallop.    Pulmonary:      Effort: Pulmonary effort is normal. No respiratory distress.   Abdominal:      General: There is no distension.      Palpations: Abdomen is soft.   Musculoskeletal:         General: No swelling or tenderness.   Skin:     General: Skin is dry.   Neurological:      General: No focal deficit present.      Mental Status: He is alert and oriented to person, place, and  time.      Cranial Nerves: No cranial nerve deficit.   Psychiatric:         Mood and Affect: Mood normal.         Fluids    Intake/Output Summary (Last 24 hours) at 5/3/2021 1606  Last data filed at 5/2/2021 1822  Gross per 24 hour   Intake --   Output 300 ml   Net -300 ml       Laboratory  Recent Labs     05/01/21  0857 05/02/21  0344 05/03/21  0604   WBC 17.6* 14.4* 9.7   RBC 3.19* 3.31* 3.24*   HEMOGLOBIN 7.6* 7.8* 7.6*   HEMATOCRIT 24.3* 25.4* 24.9*   MCV 76.2* 76.7* 76.9*   MCH 23.8* 23.6* 23.5*   MCHC 31.3* 30.7* 30.5*   RDW 45.5 47.6 45.8   PLATELETCT 261 291 288   MPV 9.5 9.7 9.5     Recent Labs     05/01/21  0857 05/02/21  0344 05/03/21  0604   SODIUM 134* 139 132*   POTASSIUM 3.9 3.7 3.4*   CHLORIDE 102 104 102   CO2 25 25 25   GLUCOSE 100* 103* 114*   BUN 17 18 19   CREATININE 0.93 0.74 0.84   CALCIUM 8.7 8.6 8.5                   Imaging  EC-ECHOCARDIOGRAM LTD W/O CONT         EC-ECHOCARDIOGRAM COMPLETE W/O CONT   Final Result      CT-ABDOMEN-PELVIS WITH   Final Result      1.  No acute inflammatory process in the abdomen or pelvis.   2.  Heterogeneous perfusion of the liver, most likely secondary to right heart dysfunction.   3.  Cardiomegaly and right atrial enlargement.   4.  Small right and trace left pleural effusions and associated atelectasis.   5.  Trabeculated bladder wall and multiple bladder diverticula, related to outlet obstruction.      [id number]   Final Result      1.  Poor dentition with multiple absent teeth.   2.  Periapical lucency is seen involving one of the left mandibular premolars.   3.  No mandibular fracture.      DX-CHEST-PORTABLE (1 VIEW)   Final Result      Left basilar opacity which could represent atelectasis and/or pneumonitis.      Mild interstitial opacities most likely represent chronic changes and/or mild vascular congestion.      CT-HEAD W/O   Final Result         1.  No acute intracranial process.      2. Diffuse atrophy and periventricular white matter  changes consistent with chronic small vessel disease. Encephalomalacia in the right frontal and left temporal lobes           Assessment/Plan  * Recurrent UTI- (present on admission)  Assessment & Plan  Likely induced by self-catheterization.  Urine culture negative so far.    Bacteremia  Assessment & Plan  Blood cultures current Streptococcus gallolyticus 2/2 (Step bovis)   Awaiting sensitivity.  On Zosyn for now.  Echocardiogram ordered to rule out endocarditis.  ID consulted   5/2: CT abdomen pelvis negative for any abscess formation or masses.  Pending sensitivity.  5/3: Patient developed allergic reaction to Unasyn.  That was discontinued and replaced with Ancef and Flagyl.  Echocardiogram study was poor.  Ordered repeat.  Consulted GI per ID recommendations for possible colonoscopy.  They recommended barium enema for now and possibly colonoscopy if any lesion seen.  Xarelto has been held.        Sepsis ([facility])  Assessment & Plan  This is Sepsis Present on admission  SIRS criteria identified on my evaluation include: Fever, with temperature greater than 101 deg F and Tachycardia, with heart rate greater than 90 BPM  Source is either urinary or lungs  Suspected onset of infection (date and time) a.m. 4/30/2021  Sepsis protocol initiated  Fluid resuscitation ordered per protocol  IV antibiotics as appropriate for source of sepsis  While organ dysfunction may be noted elsewhere in this problem list or in the chart, degree of organ dysfunction does not meet CMS criteria for severe sepsis    5/3: Repeat echocardiogram as prior study was poor.            PAF (paroxysmal atrial fibrillation) ([facility])- (present on admission)  Assessment & Plan  Rate controlled (not on rate control medications), continue Xarelto  5/3: Holding Xarelto for possible colonoscopy with biopsy.    Allergic reaction- (present on admission)  Assessment & Plan  Developed allergic reaction to Unasyn resolved with Benadryl.  Switch to Ancef and  Flagyl.      Sundowning- (present on admission)  Assessment & Plan  Avoid benzodiazepines, anticholinergics  Minimize hypnotics, sedatives, narcotics.  Minimize lines.  Daily mentation.  Started low-dose Seroquel.    Lactic acidosis- (present on admission)  Assessment & Plan  Likely secondary to above.  Continue with gentle IV hydration.  Trending down.    Microcytic anemia- (present on admission)  Assessment & Plan  Low iron level and saturation.  Start replacement.  Monitor H&H.    History of stroke- (present on admission)  Assessment & Plan  With some residual speech deficits, consider aspiration as etiology as well, follow-up chest x-ray  PT OT SLP  Pending SNF  Xarelto has been held for possible colonoscopy.        VTE prophylaxis: Xarleto
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
## 2021-05-03 NOTE — ASSESSMENT & PLAN NOTE
Avoid benzodiazepines, anticholinergics  Minimize hypnotics, sedatives, narcotics.  Minimize lines.  Daily mentation.  Started low-dose Seroquel.

## 2021-05-03 NOTE — PROGRESS NOTES
Spiritual Care Note    Patient Information     Patient's Name: Alistair Tavares   MRN: [id number]    YOB: 1924   Age and Gender: 89 y.o. male   Service Area: [facility]   Room (and Bed): [place] [address]   Ethnicity or Nationality:     Primary Language: English   Adventist/Spiritual preference: None   Place of Residence: [place]   Family/Friends/Others Present: Yes   Clinical Team Present: Yes   Medical Diagnosis(-es)/Procedure(s): Sepsis   Code Status: Full Code    Date of Admission: 4/30/2021   Length of Stay: 3 days        Spiritual Care Provider Information:  Name of Spiritual Care Provider: Dionicio Gamino  Title of Spiritual Care Provider: Associate   Phone Number: [phone]  E-mail: [email or web address]  Total time : 15 minutes    Spiritual Screen Results:    Gen Nursing        Palliative Care         Encounter/Request Information    Encounter/Request Type     Visited With: Patient and family together, Patient not available(Pt was not awake)    Nature of the Visit: Initial, On shift    General Visit: Yes    Religous Needs/Values       Spiritual Assessment     Spiritual Care Encounters  Interacton/Conversation: Pt was asleep for visit. Spiritual Care Request was for  to visit with Pt's wife and console her in her grief    Intended Effects: Helping Someone Feel Comforted, Promote a Sense of Peace    Interventions: Compassionate Presence    Plan: Visit Upon Request    Notes:

## 2021-05-03 NOTE — PROGRESS NOTES
Assumed care at 0700. Bedside report received from Real. Patient's chart and MAR reviewed. Pt sleeping at this time. White board updated. Call light, phone and personal belongings within reach.

## 2021-05-03 NOTE — PROGRESS NOTES
Infectious Disease Progress Note    Author: Deja Holman M.D. Date & Time of service: 5/3/2021  9:12 AM    Chief Complaint:  Follow-up for bacteremia    Interval History:   patient remains afebrile, white count down to 14.4, he is resting comfortably this morning, per wife no acute events overnight and no issues with new antibiotics  5/3 afebrile WBC 9.7 patient reportedly developed a rash yesterday and thus antibiotics were changed to Ancef and Flagyl.  Blood cultures on  are negative to date.  Wife at bedside states that her  was agitated and was moving around during TTE.  She also would prefer that colonoscopy be performed in the hospital as the colonoscopy prep would be difficult on an outpatient basis.  Per wife, patient has never had a colonoscopy in the past    Labs Reviewed and Medications Reviewed.    Review of Systems:  Review of Systems   Unable to perform ROS: Mental status change       Hemodynamics:  Temp (24hrs), Av.9 °C (98.4 °F), Min:36.6 °C (97.9 °F), Max:37.3 °C (99.2 °F)  Temperature: 36.6 °C (97.9 °F)  Pulse  Av.7  Min: 69  Max: 111   Blood Pressure : 125/76       Physical Exam:  Physical Exam  Vitals and nursing note reviewed.   Constitutional:       Comments: Elderly and frail appearing, resting this morning   HENT:      Mouth/Throat:      Mouth: Mucous membranes are dry.      Pharynx: No oropharyngeal exudate.   Cardiovascular:      Rate and Rhythm: Normal rate and regular rhythm.   Pulmonary:      Effort: Pulmonary effort is normal. No respiratory distress.      Breath sounds: No stridor.   Abdominal:      General: There is no distension.      Tenderness: There is no abdominal tenderness.   Musculoskeletal:         General: No tenderness.      Cervical back: No rigidity.   Skin:     Findings: No erythema or rash.   Neurological:      Comments: Confused, follows simple commands   Psychiatric:      Comments: Unable to assess         Meds:    Current  Facility-Administered Medications:   •  NS  •  potassium chloride SA  •  diphenhydrAMINE-zinc acetate  •  ceFAZolin  •  metroNIDAZOLE (FLAGYL) IV  •  Iron Dextran IVPB total body iron replacement  •  iron dextran IVPB test dose  •  acetaminophen  •  diphenhydrAMINE **OR** diphenhydrAMINE  •  famotidine  •  haloperidol lactate  •  LORazepam  •  rivaroxaban  •  finasteride  •  acetaminophen  •  Notify provider if pain remains uncontrolled **AND** Use the Numeric Rating Scale (NRS), Cortez-Baker Faces (WBF), or FLACC on regular floors and Critical-Care Pain Observation Tool (CPOT) on ICUs/Trauma to assess pain **AND** Pulse Ox **AND** Pharmacy Consult Request **AND** If patient difficult to arouse and/or has respiratory depression (respiratory rate of 10 or less), stop any opiates that are currently infusing and call a Rapid Response.  •  oxyCODONE immediate-release **OR** oxyCODONE immediate-release **OR** HYDROmorphone  •  ondansetron  •  ondansetron    Labs:  Recent Labs     05/01/21 0857 05/02/21 0344 05/03/21  0604   WBC 17.6* 14.4* 9.7   RBC 3.19* 3.31* 3.24*   HEMOGLOBIN 7.6* 7.8* 7.6*   HEMATOCRIT 24.3* 25.4* 24.9*   MCV 76.2* 76.7* 76.9*   MCH 23.8* 23.6* 23.5*   RDW 45.5 47.6 45.8   PLATELETCT 261 291 288   MPV 9.5 9.7 9.5   NEUTSPOLYS 84.30* 78.00* 81.00*   LYMPHOCYTES 5.30* 7.00* 6.60*   MONOCYTES 9.20 12.50 9.00   EOSINOPHILS 0.20 1.30 2.80   BASOPHILS 0.40 0.70 0.20     Recent Labs     05/01/21 0857 05/02/21  0344 05/03/21  0604   SODIUM 134* 139 132*   POTASSIUM 3.9 3.7 3.4*   CHLORIDE 102 104 102   CO2 25 25 25   GLUCOSE 100* 103* 114*   BUN 17 18 19     Recent Labs     05/01/21  0857 05/02/21  0344 05/03/21  0604   ALBUMIN 3.3 3.4 3.3   TBILIRUBIN 1.0 0.6 0.6   ALKPHOSPHAT 133* 167* 126*   TOTPROTEIN 6.4 6.8 6.4   ALTSGPT 28 44 40   ASTSGOT 51* 60* 51*   CREATININE 0.93 0.74 0.84       Imaging:  CT-ABDOMEN-PELVIS WITH    Result Date: 5/1/2021 5/1/2021 7:25 PM HISTORY/REASON FOR EXAM:  Sepsis.  TECHNIQUE/EXAM DESCRIPTION:   Contiguous axial images were obtained from the diaphragmatic domes to the pubic symphysis following intravenous contrast administration. Coronal and sagittal reformats were generated and reviewed. 100 mL of Omnipaque 350 nonionic contrast was administered without complication. Low dose optimization technique was utilized for this CT exam including automated exposure control and adjustment of the mA and/or kV according to patient size. COMPARISON: 8/4/2012 FINDINGS: Lower chest: Small right and trace left pleural effusions and associated atelectasis. Cardiomegaly. Right atrial enlargement. Liver: Nutmeg liver. No hepatic mass lesions are detected. No intrahepatic biliary dilatation. Gallbladder: No mural thickening. No radiopaque gallstones. Common bile duct: Nondilated. Pancreas: Atrophic. No pancreatic mass lesions are detected. Spleen: No mass. Adrenals: No mass. Kidneys: No suspicious mass lesions. No hydronephrosis. Stomach, small bowel, colon: No bowel wall thickening or obstruction. Peritoneal cavity: No ascites. Lymph nodes: No enlarged nodes by size criteria. Aorta: No aneurysm. Atherosclerosis. Pelvic organs: Trabeculated bladder wall and multiple bladder diverticula. Prostatic calcifications. Musculoskeletal structures: Osteopenia. Spondylosis.     1.  No acute inflammatory process in the abdomen or pelvis. 2.  Heterogeneous perfusion of the liver, most likely secondary to right heart dysfunction. 3.  Cardiomegaly and right atrial enlargement. 4.  Small right and trace left pleural effusions and associated atelectasis. 5.  Trabeculated bladder wall and multiple bladder diverticula, related to outlet obstruction.    CT-HEAD W/O    Result Date: 4/30/2021 4/30/2021 9:57 AM HISTORY/REASON FOR EXAM:  Altered mental status TECHNIQUE/EXAM DESCRIPTION AND NUMBER OF VIEWS:  CT scan of the head without contrast. Contiguous 5 mm axial sections were obtained from the skull base through  the vertex. Up to date radiation dose reduction adjustments have been utilized to meet ALARA standards for radiation dose reduction. COMPARISON: 6/6/2020 FINDINGS:     No acute hemorrhage, mass-effect, or midline shift.   There is diffuse atrophy.   Periventricular white matter changes are consistent with chronic small vessel disease. There is encephalomalacia in the left temporal lobe. There is encephalomalacia in the right frontal lobe.  Ventricles and cisterns are patent. No ischemia or intracranial mass is identified. The paranasal sinuses and mastoid air cells are clear.     1.  No acute intracranial process. 2. Diffuse atrophy and periventricular white matter changes consistent with chronic small vessel disease. Encephalomalacia in the right frontal and left temporal lobes    DX-CHEST-PORTABLE (1 VIEW)    Result Date: 4/30/2021 4/30/2021 10:23 AM HISTORY/REASON FOR EXAM:  Sepsis; sepsis. TECHNIQUE/EXAM DESCRIPTION AND NUMBER OF VIEWS: Single portable view of the chest. COMPARISON: 6/6/2020 FINDINGS: Cardiomediastinal silhouette is stable. Aortic calcified atherosclerotic plaque. Mild interstitial prominence diffusely could represent vascular congestion and/or chronic changes. Hazy left basilar opacity could represent atelectasis, scarring and/or pneumonitis. No significant pleural effusion or pneumothorax. Generalized osteopenia.     Left basilar opacity which could represent atelectasis and/or pneumonitis. Mild interstitial opacities most likely represent chronic changes and/or mild vascular congestion.    [id number]    Result Date: 5/1/2021 5/1/2021 3:00 PM HISTORY/REASON FOR EXAM:  Jaw Pain. TECHNIQUE/EXAM DESCRIPTION AND NUMBER OF VIEWS:  1 view(s) of the mandible. COMPARISON:  None. FINDINGS: Poor dentition, with multiple absent teeth. Multiple dental fillings. Periapical lucency is seen involving one of the left mandibular premolars. No mandibular fracture.     1.  Poor dentition with  "multiple absent teeth. 2.  Periapical lucency is seen involving one of the left mandibular premolars. 3.  No mandibular fracture.      Micro:  Results     Procedure Component Value Units Date/Time    BLOOD CULTURE [[id number]] Collected: 05/02/21 0344    Order Status: Completed Specimen: Blood from Peripheral Updated: 05/03/21 0725     Significant Indicator NEG     Source BLD     Site PERIPHERAL     Culture Result No Growth  Note: Blood cultures are incubated for 5 days and  are monitored continuously.Positive blood cultures  are called to the RN and reported as soon as  they are identified.      Narrative:      Per Hospital Policy: Only change Specimen Src: to \"Line\" if  specified by physician order.  Right AC    BLOOD CULTURE [[id number]] Collected: 05/02/21 0343    Order Status: Completed Specimen: Blood from Peripheral Updated: 05/03/21 0725     Significant Indicator NEG     Source BLD     Site PERIPHERAL     Culture Result No Growth  Note: Blood cultures are incubated for 5 days and  are monitored continuously.Positive blood cultures  are called to the RN and reported as soon as  they are identified.      Narrative:      Per Hospital Policy: Only change Specimen Src: to \"Line\" if  specified by physician order.  Left AC    BLOOD CULTURE [[id number]]  (Abnormal) Collected: 04/30/21 0940    Order Status: Completed Specimen: Blood from Peripheral Updated: 05/02/21 0803     Significant Indicator POS     Source BLD     Site PERIPHERAL     Culture Result Growth detected by Bactec instrument. 04/30/2021  21:11      -  Streptococcus gallolyticus  See previous culture for sensitivity report.      Narrative:      CALL  [facility]  183 tel. [phone],  CALLED  183 tel. [phone] 04/30/2021, 21:11, RB PERF. RESULTS CALLED TO: RN  [id number]  Per Hospital Policy: Only change Specimen Src: to \"Line\" if  specified by physician order.  Right AC    URINE CULTURE(NEW) [[id number]] Collected: 04/30/21 1655    Order Status: Completed Specimen: " "Urine Updated: 05/02/21 0741     Significant Indicator NEG     Source UR     Site -     Culture Result No growth at 48 hours.    Narrative:      Indication for culture:->Emergency Room Patient  Indication for culture:->Emergency Room Patient    BLOOD CULTURE [[id number]]  (Abnormal) Collected: 04/30/21 0950    Order Status: Completed Specimen: Blood from Peripheral Updated: 05/01/21 1057     Significant Indicator POS     Source BLD     Site PERIPHERAL     Culture Result Growth detected by Bactec instrument. 04/30/2021  21:08      Streptococcus species  Streptococcus gallolyticus  Susceptibilities in progress      Narrative:      CALL  [facility]  183 tel. [phone],  CALLED  183 tel. [phone] 04/30/2021, 21:11, RB PERF. RESULTS CALLED TO: RN  [id number]  Per Hospital Policy: Only change Specimen Src: to \"Line\" if  specified by physician order.  Right AC    CoV-2 and Flu A/B by PCR (24 hour In-House): Collect NP swab in [facility] [[id number]] Collected: 04/30/21 1045    Order Status: Completed Specimen: Respirate from Nasopharyngeal Updated: 04/30/21 2001     Influenza virus A RNA Negative     Influenza virus B, PCR Negative     SARS-CoV-2 by PCR NotDetected     Comment: PATIENTS: Important information regarding your results and instructions can  be found at [email or web address]   \"After your  Covid-19 Test\"  [facility] providers: PLEASE REFER TO DE-ESCALATION AND RETESTING PROTOCOL  on [email or web address]  **The Roche SARS-CoV-2 RT-PCR test has been made available for use under the  Emergency Use Authorization (EUA) only.          SARS-CoV-2 Source NP Swab    Narrative:      Have you been in close contact with a person who is suspected  or known to be positive for COVID-19 within the last 30 days  (e.g. last seen that person < 30 days ago)->No    URINALYSIS [[id number]]  (Abnormal) Collected: 04/30/21 1655    Order Status: Completed Specimen: Urine Updated: 04/30/21 1745     Color Yellow     Character Cloudy     " Specific Gravity 1.014     Ph 6.5     Glucose Negative mg/dL      Ketones Negative mg/dL      Protein Negative mg/dL      Bilirubin Negative     Urobilinogen, Urine 0.2     Nitrite Positive     Leukocyte Esterase Large     Occult Blood Small     Micro Urine Req Microscopic    Narrative:      Indication for culture:->Emergency Room Patient    Influenza By PCR, A/B [[id number]] Collected: 04/30/21 0000    Order Status: Canceled Specimen: Respirate from Nasopharyngeal           Assessment:  Alistair Tavares is a 89 y.o. male with known history of CAD, paroxysmal A. fib and CVA on Xarelto, severe aortic stenosis deemed not a surgical candidate given frailty, reported history of recurrent UTIs, presented on 4/30 with fevers and chills for the prior few days along with generalized weakness and body aches, found to have Strep gallolyticus bacteremia with positive blood cultures on 4/30/2021     Pertinent Diagnoses:  Sepsis, improving  Strep gallolyticus bacteremia  Asymptomatic bacteriuria  Severe aortic stenosis, deemed not a surgical candidate  Paroxysmal A. Fib  Chronic anticoagulation   CAD  History of CVA    Plan:  -IV Unasyn discontinued on 5/2 secondary development of a rash.  -Antibiotics changed to IV cefazolin and Flagyl  -Repeat blood cultures x2 on 5/2-negative to date  -CT abdomen and pelvis with no abscesses  -Strep gallolyticus (formerly bovis) is a GI organism, associated with colon cancer.  Recommend colonoscopy.  Patient's wife would prefer that colonoscopy be performed in the hospital as outpatient prep would be difficult  -TTE-technically difficult study and incomplete information obtained however was noted to have densely calcified aortic valve leaflets (patient's wife states that her  was very agitated during the procedure).  Recommend repeating TTE    Discussed with internal medicine, Dr. Mohamud and wife at bedside    ID will follow.  Please call with questions.

## 2021-05-03 NOTE — CARE PLAN
Pt turned and positioned every two hours. Incontinence care provided and barrier paste applied as needed.  Fall precautions in place. Bed in lowest position. Non-skid socks in place. Personal possessions within reach. Mobility sign on door. Bed-alarm on. Call light within reach. Pt educated regarding fall prevention and states understanding.

## 2021-05-04 ENCOUNTER — APPOINTMENT (OUTPATIENT)
Dept: RADIOLOGY | Facility: MEDICAL CENTER | Age: 86
DRG: 872 | End: 2021-05-04
Attending: NURSE PRACTITIONER
Payer: MEDICARE

## 2021-05-04 PROBLEM — E87.20 LACTIC ACIDOSIS: Status: RESOLVED | Noted: 2021-05-01 | Resolved: 2021-05-04

## 2021-05-04 LAB
ALBUMIN SERPL BCP-MCNC: 2.9 G/DL (ref 3.2–4.9)
ALBUMIN/GLOB SERPL: 1 G/DL
ALP SERPL-CCNC: 118 U/L (ref 30–99)
ALT SERPL-CCNC: 35 U/L (ref 2–50)
ANION GAP SERPL CALC-SCNC: 10 MMOL/L (ref 7–16)
ANISOCYTOSIS BLD QL SMEAR: ABNORMAL
AST SERPL-CCNC: 54 U/L (ref 12–45)
BASOPHILS # BLD AUTO: 0 % (ref 0–1.8)
BASOPHILS # BLD: 0 K/UL (ref 0–0.12)
BILIRUB SERPL-MCNC: 0.6 MG/DL (ref 0.1–1.5)
BUN SERPL-MCNC: 16 MG/DL (ref 8–22)
BURR CELLS BLD QL SMEAR: NORMAL
CALCIUM SERPL-MCNC: 8.1 MG/DL (ref 8.5–10.5)
CHLORIDE SERPL-SCNC: 106 MMOL/L (ref 96–112)
CO2 SERPL-SCNC: 20 MMOL/L (ref 20–33)
CREAT SERPL-MCNC: 0.65 MG/DL (ref 0.5–1.4)
EOSINOPHIL # BLD AUTO: 0.21 K/UL (ref 0–0.51)
EOSINOPHIL NFR BLD: 2.7 % (ref 0–6.9)
ERYTHROCYTE [DISTWIDTH] IN BLOOD BY AUTOMATED COUNT: 48.7 FL (ref 35.9–50)
GLOBULIN SER CALC-MCNC: 2.8 G/DL (ref 1.9–3.5)
GLUCOSE SERPL-MCNC: 85 MG/DL (ref 65–99)
HCT VFR BLD AUTO: 26.5 % (ref 42–52)
HGB BLD-MCNC: 7.9 G/DL (ref 14–18)
HYPOCHROMIA BLD QL SMEAR: ABNORMAL
LYMPHOCYTES # BLD AUTO: 0.2 K/UL (ref 1–4.8)
LYMPHOCYTES NFR BLD: 2.6 % (ref 22–41)
MACROCYTES BLD QL SMEAR: ABNORMAL
MANUAL DIFF BLD: NORMAL
MCH RBC QN AUTO: 23.2 PG (ref 27–33)
MCHC RBC AUTO-ENTMCNC: 29.8 G/DL (ref 33.7–35.3)
MCV RBC AUTO: 77.9 FL (ref 81.4–97.8)
MICROCYTES BLD QL SMEAR: ABNORMAL
MONOCYTES # BLD AUTO: 0.27 K/UL (ref 0–0.85)
MONOCYTES NFR BLD AUTO: 3.5 % (ref 0–13.4)
MORPHOLOGY BLD-IMP: NORMAL
NEUTROPHILS # BLD AUTO: 7.02 K/UL (ref 1.82–7.42)
NEUTROPHILS NFR BLD: 91.2 % (ref 44–72)
NRBC # BLD AUTO: 0 K/UL
NRBC BLD-RTO: 0 /100 WBC
OVALOCYTES BLD QL SMEAR: NORMAL
PLATELET # BLD AUTO: 286 K/UL (ref 164–446)
PLATELET BLD QL SMEAR: NORMAL
PMV BLD AUTO: 9.9 FL (ref 9–12.9)
POLYCHROMASIA BLD QL SMEAR: NORMAL
POTASSIUM SERPL-SCNC: 3.9 MMOL/L (ref 3.6–5.5)
PROT SERPL-MCNC: 5.7 G/DL (ref 6–8.2)
RBC # BLD AUTO: 3.4 M/UL (ref 4.7–6.1)
RBC BLD AUTO: PRESENT
SODIUM SERPL-SCNC: 136 MMOL/L (ref 135–145)
WBC # BLD AUTO: 7.7 K/UL (ref 4.8–10.8)

## 2021-05-04 PROCEDURE — 770020 HCHG ROOM/CARE - TELE (206)

## 2021-05-04 PROCEDURE — 700102 HCHG RX REV CODE 250 W/ 637 OVERRIDE(OP): Performed by: HOSPITALIST

## 2021-05-04 PROCEDURE — A9270 NON-COVERED ITEM OR SERVICE: HCPCS | Performed by: FAMILY MEDICINE

## 2021-05-04 PROCEDURE — 99233 SBSQ HOSP IP/OBS HIGH 50: CPT | Performed by: INTERNAL MEDICINE

## 2021-05-04 PROCEDURE — 85007 BL SMEAR W/DIFF WBC COUNT: CPT

## 2021-05-04 PROCEDURE — 85027 COMPLETE CBC AUTOMATED: CPT

## 2021-05-04 PROCEDURE — 36415 COLL VENOUS BLD VENIPUNCTURE: CPT

## 2021-05-04 PROCEDURE — 700111 HCHG RX REV CODE 636 W/ 250 OVERRIDE (IP): Performed by: STUDENT IN AN ORGANIZED HEALTH CARE EDUCATION/TRAINING PROGRAM

## 2021-05-04 PROCEDURE — 74270 X-RAY XM COLON 1CNTRST STD: CPT

## 2021-05-04 PROCEDURE — 700101 HCHG RX REV CODE 250: Performed by: FAMILY MEDICINE

## 2021-05-04 PROCEDURE — 700117 HCHG RX CONTRAST REV CODE 255: Performed by: NURSE PRACTITIONER

## 2021-05-04 PROCEDURE — A9270 NON-COVERED ITEM OR SERVICE: HCPCS | Performed by: HOSPITALIST

## 2021-05-04 PROCEDURE — 700111 HCHG RX REV CODE 636 W/ 250 OVERRIDE (IP): Performed by: FAMILY MEDICINE

## 2021-05-04 PROCEDURE — 700102 HCHG RX REV CODE 250 W/ 637 OVERRIDE(OP): Performed by: FAMILY MEDICINE

## 2021-05-04 PROCEDURE — 80053 COMPREHEN METABOLIC PANEL: CPT

## 2021-05-04 PROCEDURE — 82962 GLUCOSE BLOOD TEST: CPT

## 2021-05-04 PROCEDURE — 700105 HCHG RX REV CODE 258: Performed by: FAMILY MEDICINE

## 2021-05-04 RX ORDER — POLYETHYLENE GLYCOL 3350 17 G/17G
1 POWDER, FOR SOLUTION ORAL
Status: DISCONTINUED | OUTPATIENT
Start: 2021-05-04 | End: 2021-05-07 | Stop reason: HOSPADM

## 2021-05-04 RX ADMIN — IOHEXOL 600 ML: 350 INJECTION, SOLUTION INTRAVENOUS at 12:50

## 2021-05-04 RX ADMIN — CEFAZOLIN SODIUM 2 G: 2 INJECTION, SOLUTION INTRAVENOUS at 05:19

## 2021-05-04 RX ADMIN — FAMOTIDINE 20 MG: 20 TABLET ORAL at 05:19

## 2021-05-04 RX ADMIN — METRONIDAZOLE 500 MG: 500 INJECTION, SOLUTION INTRAVENOUS at 05:19

## 2021-05-04 RX ADMIN — CEFAZOLIN SODIUM 2 G: 2 INJECTION, SOLUTION INTRAVENOUS at 20:18

## 2021-05-04 RX ADMIN — SODIUM CHLORIDE: 9 INJECTION, SOLUTION INTRAVENOUS at 00:41

## 2021-05-04 RX ADMIN — CEFAZOLIN SODIUM 2 G: 2 INJECTION, SOLUTION INTRAVENOUS at 14:22

## 2021-05-04 RX ADMIN — LORAZEPAM 1 MG: 2 INJECTION INTRAMUSCULAR; INTRAVENOUS at 12:22

## 2021-05-04 RX ADMIN — FINASTERIDE 5 MG: 5 TABLET, FILM COATED ORAL at 05:19

## 2021-05-04 RX ADMIN — METRONIDAZOLE 500 MG: 500 INJECTION, SOLUTION INTRAVENOUS at 20:18

## 2021-05-04 RX ADMIN — METRONIDAZOLE 500 MG: 500 INJECTION, SOLUTION INTRAVENOUS at 14:26

## 2021-05-04 ASSESSMENT — ENCOUNTER SYMPTOMS
FEVER: 0
COUGH: 0
ABDOMINAL PAIN: 0
CHILLS: 0

## 2021-05-04 ASSESSMENT — FIBROSIS 4 INDEX: FIB4 SCORE: 3.06

## 2021-05-04 NOTE — DIETARY
"Nutrition Services   Day 4 of admit. Alistair Tavares is a 89 y.o. male with admitting DX of sepsis.    Pt is currently on clear liquid diet for barium enema today. RN reports pt's diet is planned to advance after procedure. Pt was previously on a regular diet. PO refused - 100% x 8 meals (% x 6) per flow sheet since 4/30. Consult received for \"Poor oral intake - Pt sundowning by 5 pm every night and is not able to eat any of his dinner. Would like to see if he could have more food before 5 pm.\" This RD communicated with kitchen to ensure pt is able to have an early tray for dinner.     Wt 5/2: 63.9 kg (140 lb 14 oz) via bed scale.    Malnutrition Risk: Nutrition admit screen negative. No criteria met at this time.     Recommendations/Plan:  1. Early tray for dinner.    2. Advance diet >NPO/clear liquid when medically feasible.   3. Monitor weight.    "

## 2021-05-04 NOTE — CARE PLAN
Problem: Communication  Goal: The ability to communicate needs accurately and effectively will improve  Outcome: PROGRESSING SLOWER THAN EXPECTED  Note: Pt's whiteboard is updated, pt has been updated on POC, all questions have been answered at this time. Pt oriented to self. No signs of learning evidence       Problem: Safety  Goal: Will remain free from falls  Outcome: PROGRESSING AS EXPECTED  Note: Bed locked in lowest position. Bed alarm on. Treaded socks in use. Call light and belongings within reach. Pt educated to call for assistance. Hourly rounding in place.

## 2021-05-04 NOTE — PROGRESS NOTES
Hospital Medicine Daily Progress Note    Date of Service  5/4/2021    Chief Complaint  Fever    Hospital Course  This is a 89 years old male who has past medical history of coronary artery disease, paroxysmal A. fib and stroke on Xarelto, history of severe aortic stenosis deemed not a candidate for intervention given patient frailty and lack of ability to recover from such a procedure, and history of recurrent UTI who has been experiencing fever and chills over the last few days.  Also patient has been feeling weak and have generalized body aches.  Presented to the hospital for further evaluation.  In ER noted to be afebrile.  Hemodynamically stable.  Blood work noted to have leukocytosis.  Patient does not report urinary symptoms.  However, per his wife he is usually asymptomatic with his history of previous UTI's.  UA was positive.  Started on antibiotics.  Chest x-ray showed left basilar atelectasis with no infiltrate or consolidation.  Blood culture came back positive for Streptococcus species.  ID consulted.    Interval Problem Update  Afebrile.  No event over night.  Discussed at length with wife patient's clinical status.  She does not want him to have invasive diagnostic procedure, goal to maximize quality of life.      Consultants/Specialty  ID    Code Status  Full Code    Disposition  Pending SNF acceptance.    Review of Systems  Review of Systems   Constitutional: Negative for chills and fever.   Respiratory: Negative for cough.    Cardiovascular: Negative for chest pain.   Gastrointestinal: Negative for abdominal pain.   Genitourinary: Negative for dysuria and urgency.        Physical Exam  Temp:  [36.1 °C (97 °F)-36.9 °C (98.4 °F)] 36.8 °C (98.2 °F)  Pulse:  [66-96] 96  Resp:  [1-20] 1  BP: (106-143)/() 143/92  SpO2:  [92 %-97 %] 94 %    Physical Exam  Constitutional:       Appearance: Normal appearance.   HENT:      Head: Normocephalic.   Cardiovascular:      Rate and Rhythm: Normal rate.    Pulmonary:      Effort: Pulmonary effort is normal.      Breath sounds: Normal breath sounds.   Abdominal:      General: Abdomen is flat.   Neurological:      General: No focal deficit present.      Mental Status: He is alert.         Laboratory  Recent Labs     05/02/21 0344 05/03/21 0604 05/04/21 0349   WBC 14.4* 9.7 7.7   RBC 3.31* 3.24* 3.40*   HEMOGLOBIN 7.8* 7.6* 7.9*   HEMATOCRIT 25.4* 24.9* 26.5*   MCV 76.7* 76.9* 77.9*   MCH 23.6* 23.5* 23.2*   MCHC 30.7* 30.5* 29.8*   RDW 47.6 45.8 48.7   PLATELETCT 291 288 286   MPV 9.7 9.5 9.9     Recent Labs     05/02/21 0344 05/03/21 0604 05/04/21 0349   SODIUM 139 132* 136   POTASSIUM 3.7 3.4* 3.9   CHLORIDE 104 102 106   CO2 25 25 20   GLUCOSE 103* 114* 85   BUN 18 19 16   CREATININE 0.74 0.84 0.65   CALCIUM 8.6 8.5 8.1*                   Imaging  None recently     Assessment/Plan  * Bacteremia  Assessment & Plan  Blood cultures current Streptococcus gallolyticus 2/2 (Step bovis)   I discussed with pt's wife today, she and pt are not interested in any invasive procedure.    Discussed with ID.  Cont abx for now.    Continue barium study, but they decline colonoscopy.  Consult Palliative Care for Advance Care planing.        PAF (paroxysmal atrial fibrillation) (HCC)- (present on admission)  Assessment & Plan  Rate controlled (not on rate control medications).  Restart Xarelto.      Allergic reaction- (present on admission)  Assessment & Plan  Developed allergic reaction to Unasyn resolved with Benadryl.  Switch to Ancef and Flagyl.      Microcytic anemia- (present on admission)  Assessment & Plan  Low iron level and saturation.  Start replacement.  Monitor H&H.    Recurrent UTI- (present on admission)  Assessment & Plan  Likely induced by self-catheterization.  Urine culture negative so far.    Sundowning- (present on admission)  Assessment & Plan  Avoid benzodiazepines, anticholinergics  Minimize hypnotics, sedatives, narcotics.  Minimize lines.  Daily  mentation.  Started low-dose Seroquel.    Sepsis (HCC)  Assessment & Plan  This is Sepsis Present on admission  SIRS criteria identified on my evaluation include: Fever, with temperature greater than 101 deg F and Tachycardia, with heart rate greater than 90 BPM  Source is either urinary or lungs  Suspected onset of infection (date and time) a.m. 4/30/2021  Sepsis protocol initiated  Fluid resuscitation ordered per protocol  IV antibiotics as appropriate for source of sepsis  While organ dysfunction may be noted elsewhere in this problem list or in the chart, degree of organ dysfunction does not meet CMS criteria for severe sepsis    Resolved    History of stroke- (present on admission)  Assessment & Plan  With some residual speech deficits, consider aspiration as etiology as well, follow-up chest x-ray  PT OT SLP  Pending SNF  Xarelto has been held for possible colonoscopy.        VTE prophylaxis: Xarleto

## 2021-05-04 NOTE — PROGRESS NOTES
Gastroenterology Consult Note:    Deo Casarez M.D. with [facility]  Date & Time note created:    5/4/2021   1:08 PM     Referring MD:  Dr. Renetta Mohamud  Patient ID:  Name:             Alistair Tavares   YOB: 1924  Age:                 89 y.o.  male   MRN:               [id number]                                                             Reason for Consult:      1. Streptococcus gallolyticus    History of Present Illness:    89 yo male PMH CAD, Paroxysmal Atrial fibrillation, CVA on Xarelto, severe aortic stenosis-not a surgical candidate, urinary retention requiring self catheterizations, recurrent UTI's who was admitted to the hospital 4/30/31 with fevers, chills, generalized weakness, bodyaches for a few days prior to admission. Blood cultures: Strep. gallolyticus bacteremia. Initially started on Unasyn however developed a rash, currently on IV Cefazolin 2g every 8 hours and IV Metronidzaole 500mg every 8 hours. Patient speech is somewhat garbled but denies abdominal pain, changes in bowel habits, rectal bleeding. Recommendations for colonoscopy due to streptocococcus gallolyticus bacteremia which has a correlation with colon neoplasm. Patient has never had a colonoscopy.    CT scan abdomen/pelvi: 5/1/21: No acute inflammatory process in the abdomen or pelvis.  2.  Heterogeneous perfusion of the liver, most likely secondary to right heart dysfunction.  3.  Cardiomegaly and right atrial enlargement.  4.  Small right and trace left pleural effusions and associated atelectasis.  5.  Trabeculated bladder wall and multiple bladder diverticula, related to outlet obstruction.    Most of the information was obtained from his wife, Alicia who was present in the room    He had a TTE which was technically difficult study and incomplete information. Patients wife stated patient was agitated during the procedure. Infectious Disease recommended repeating the  TTE.    Patient was restarted on Xarelto this morning.    INTERVAL HISTORY:   5/4/21: Patient was down to radiology for barium enema study. His wife is not wanting to pursue any further invasive procedures including colonoscopy. She is requesting IV antibiotics only for patient. WBC continues to trend down. Currently on Cefazolin and Metronidazole IV.  Barium enema was a very limited exam, but excludes significant pathology in the rectum and sigmoid colon.  Patient and his wife refuse colonoscopy due to his age and comorbidities (I agree). Radiologist contacted me and states that he thoroughly reviewed the CT and does not see any suspicious colonic lesions.    Review of Systems:      Review of Systems   Unable to perform ROS: Other   HENT: Hearing loss: very hard of hearing.              Physical Exam:  Vitals/ General Appearance:   Weight/BMI: Body mass index is 19.11 kg/m².    Vitals:    05/04/21 0342 05/04/21 0730 05/04/21 1125 05/04/21 1222   BP: 127/74 131/83 143/92    Pulse: 70 84 96    Resp: 17 16 16 (!) 1   Temp: 36.5 °C (97.7 °F) 36.1 °C (97 °F) 36.8 °C (98.2 °F)    TempSrc: Temporal Temporal Temporal    SpO2: 92% 93% 94%    Weight:       Height:         Oxygen Therapy:  Pulse Oximetry: 94 %, O2 (LPM): 2, O2 Delivery Device: Silicone Nasal Cannula    Physical Exam   Nursing note and vitals reviewed.      Past Medical History:   Past Medical History:   Diagnosis Date   • Angina 2005    episode of atrial fibrillation   • Arrhythmia     History of afib, no longer on medication   • CAD (coronary artery disease)     paroximal atrial tachycardia   • CATARACT     asim iol   • Dental disorder     lower dentures   • Macular degeneration of both eyes    • Pneumonia 2008    Not hospitalized for it   • Renal disorder 8/4/2012    pyelonephritis   • Stroke (HCC) 2015    pt reports no residual weakness   • Unspecified urinary incontinence     dribbles       Past Surgical History:  Past Surgical History:   Procedure  Laterality Date   • CYSTOSCOPY  6/13/2016    Procedure: CYSTOSCOPY 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;  Surgeon: Bucky Bustamante M.D.;  Location: SURGERY [facility];  Service:    • CATARACT PHACO WITH IOL  10/28/2009    Performed by PAULA COE at SURGERY SAME DAY [facility] ORS   • CATARACT PHACO WITH IOL  10/14/2009    Performed by PAULA COE at SURGERY SAME DAY [facility] ORS   • OTHER ORTHOPEDIC SURGERY      [facility]       Hospital Medications:    Current Facility-Administered Medications:   •  NS infusion, , Intravenous, Continuous, Renetta Mohamud M.D., Last Rate: 75 mL/hr at 05/04/21 0041, New Bag at 05/04/21 0041  •  rivaroxaban (XARELTO) tablet 15 mg, 15 mg, Oral, DAILY, Renetta Mohamud M.D., 15 mg at 05/03/21 1038  •  QUEtiapine (Seroquel) tablet 12.5 mg, 12.5 mg, Oral, Nightly, Renetta Mohamud M.D., 12.5 mg at 05/03/21 2052  •  polyethylene glycol/lytes (MIRALAX) PACKET 1 Packet, 1 Packet, Oral, QDAY PRN, Renetta Mohamud M.D.  •  senna-docusate (PERICOLACE or SENOKOT S) 8.6-50 MG per tablet 1 tablet, 1 tablet, Oral, QDAY PRN, Renetta Mohamud M.D.  •  diphenhydrAMINE-zinc acetate (BENADRYL ITCH) topical cream, , Topical, TID PRN, Renetta Mohamud M.D., Given at 05/02/21 2317  •  ceFAZolin in dextrose (ANCEF) IVPB premix 2 g, 2 g, Intravenous, Q8HRS, Renetta Mohamud M.D., Stopped at 05/04/21 0549  •  metroNIDAZOLE (FLAGYL) IVPB 500 mg, 500 mg, Intravenous, Q8HRS, Renetta Mohamud M.D., Stopped at 05/04/21 0619  •  famotidine (PEPCID) tablet 20 mg, 20 mg, Oral, DAILY, Renetta Mohamud M.D., 20 mg at 05/04/21 0519  •  haloperidol lactate (HALDOL) injection 1 mg, 1 mg, Intravenous, Q4HRS PRN, Jojo James M.D., 1 mg at 05/02/21 2248  •  LORazepam (ATIVAN) injection 1 mg, 1 mg, Intravenous, Q4HRS PRN, Jojo James M.D., 1 mg at 05/04/21 1222  •  finasteride (PROSCAR) tablet 5 mg, 5 mg, Oral, Bryce DELACRUZ M.D., 5 mg at 05/04/21 0519  •   acetaminophen (Tylenol) tablet 650 mg, 650 mg, Oral, Q6HRS PRN, Bryce Anderson M.D.  •  Notify provider if pain remains uncontrolled, , , CONTINUOUS **AND** Use the Numeric Rating Scale (NRS), Cortez-Baker Faces (WBF), or FLACC on regular floors and Critical-Care Pain Observation Tool (CPOT) on ICUs/Trauma to assess pain, , , CONTINUOUS **AND** Pulse Ox, , , CONTINUOUS **AND** Pharmacy Consult Request ...Pain Management Review 1 Each, 1 Each, Other, PHARMACY TO DOSE **AND** If patient difficult to arouse and/or has respiratory depression (respiratory rate of 10 or less), stop any opiates that are currently infusing and call a Rapid Response., , , CONTINUOUS, Bryce Anderson M.D.  •  oxyCODONE immediate-release (ROXICODONE) tablet 2.5 mg, 2.5 mg, Oral, Q3HRS PRN **OR** oxyCODONE immediate-release (ROXICODONE) tablet 5 mg, 5 mg, Oral, Q3HRS PRN **OR** HYDROmorphone (Dilaudid) injection 0.25 mg, 0.25 mg, Intravenous, Q3HRS PRN, Bryce Anderson M.D.  •  ondansetron (ZOFRAN) syringe/vial injection 4 mg, 4 mg, Intravenous, Q4HRS PRN, Bryce Anderson M.D.  •  ondansetron (ZOFRAN ODT) dispertab 4 mg, 4 mg, Oral, Q4HRS PRN, Bryce Anderson M.D.    Current Outpatient Medications:  Current Facility-Administered Medications   Medication Dose Route Frequency Provider Last Rate Last Admin   • NS infusion   Intravenous Continuous Renetta Mohamud M.D. 75 mL/hr at 05/04/21 0041 New Bag at 05/04/21 0041   • rivaroxaban (XARELTO) tablet 15 mg  15 mg Oral DAILY Renetta Mohamud M.D.   15 mg at 05/03/21 1038   • QUEtiapine (Seroquel) tablet 12.5 mg  12.5 mg Oral Nightly Renetta Mohamud M.D.   12.5 mg at 05/03/21 2052   • polyethylene glycol/lytes (MIRALAX) PACKET 1 Packet  1 Packet Oral QDAY PRJESSICA Mohamud M.D.       • senna-docusate (PERICOLACE or SENOKOT S) 8.6-50 MG per tablet 1 tablet  1 tablet Oral QDAY PRN Renetta Mohamud M.D.       • diphenhydrAMINE-zinc acetate (BENADRYL ITCH) topical cream   Topical TID  "PRN Renetta Mohamud M.D.   Given at 05/02/21 2317   • ceFAZolin in dextrose (ANCEF) IVPB premix 2 g  2 g Intravenous Q8HRS Renetta Mohamud M.D.   Stopped at 05/04/21 0549   • metroNIDAZOLE (FLAGYL) IVPB 500 mg  500 mg Intravenous Q8HRS Renetta Mohamud M.D.   Stopped at 05/04/21 0619   • famotidine (PEPCID) tablet 20 mg  20 mg Oral DAILY Renetta Mohamud M.D.   20 mg at 05/04/21 0519   • haloperidol lactate (HALDOL) injection 1 mg  1 mg Intravenous Q4HRS PRN Jojo James M.D.   1 mg at 05/02/21 2248   • LORazepam (ATIVAN) injection 1 mg  1 mg Intravenous Q4HRS PRN Jojo James M.D.   1 mg at 05/04/21 1222   • finasteride (PROSCAR) tablet 5 mg  5 mg Oral QASUMAN Anderson M.D.   5 mg at 05/04/21 0519   • acetaminophen (Tylenol) tablet 650 mg  650 mg Oral Q6HRS PRN Bryce Anderson M.D.       • Pharmacy Consult Request ...Pain Management Review 1 Each  1 Each Other PHARMACY TO DOSE Bryce Anderson M.D.       • oxyCODONE immediate-release (ROXICODONE) tablet 2.5 mg  2.5 mg Oral Q3HRS PRN Bryce Anderson M.D.        Or   • oxyCODONE immediate-release (ROXICODONE) tablet 5 mg  5 mg Oral Q3HRS PRJESSICA Anderson M.D.        Or   • HYDROmorphone (Dilaudid) injection 0.25 mg  0.25 mg Intravenous Q3HRS PRN Bryce Anderson M.D.       • ondansetron (ZOFRAN) syringe/vial injection 4 mg  4 mg Intravenous Q4HRS PRJESSICA Anderson M.D.       • ondansetron (ZOFRAN ODT) dispertab 4 mg  4 mg Oral Q4HRS PRN Bryce Anderson M.D.           Medication Allergy:  Allergies   Allergen Reactions   • Bloodless Unspecified     Pt states he is willing to accept blood/blood products as \"a last resort\"   • Crestor [Rosuvastatin Calcium] Rash and Itching     Rash/itching all over body   • Eliquis [Apixaban] Rash           • Flomax [Tamsulosin] Unspecified     Pts wife states \"His BP goes really low and he fell down\"   • Heparin Hives, Rash and Itching     Pts wife states \"Rash all over body\".   • Lipitor [Atorvastatin] Rash    " " Pts wife states \"Rash all over body\".   • Macrobid [Kdc:Red Dye+Yellow Dye+Nitrofurantoin+Brilliant Blue Fcf] Nausea     Pt's wife states \"Makes pt weak and nausea\".   • Statins [Hmg-Coa-R Inhibitors] Rash and Itching     Rash/itching all over body   • Emcin Clear Unspecified         • Unasyn [Ampicillin-Sulbactam Sodium]      Patient developed a rash with Unasyn administration during this hospital stay   • Coufarin [Warfarin] Unspecified     Pts wife states \"He just does not like that drug\".       Family History:  Family History   Problem Relation Age of Onset   • Lung Disease Father         emphysima   • Arthritis Neg Hx        Social History:  Social History     Socioeconomic History   • Marital status:      Spouse name: Not on file   • Number of children: Not on file   • Years of education: Not on file   • Highest education level: Not on file   Occupational History   • Not on file   Tobacco Use   • Smoking status: Never Smoker   • Smokeless tobacco: Never Used   Substance and Sexual Activity   • Alcohol use: Yes     Alcohol/week: 2.4 - 3.0 oz     Types: 3 Standard drinks or equivalent, 1 - 2 Cans of beer per week   • Drug use: No   • Sexual activity: Never     Partners: Female   Other Topics Concern   • Not on file   Social History Narrative   • Not on file     Social Determinants of Health     Financial Resource Strain: Low Risk    • Difficulty of Paying Living Expenses: Not hard at all   Food Insecurity: No Food Insecurity   • Worried About Running Out of Food in the Last Year: Never true   • Ran Out of Food in the Last Year: Never true   Transportation Needs: No Transportation Needs   • Lack of Transportation (Medical): No   • Lack of Transportation (Non-Medical): No   Physical Activity:    • Days of Exercise per Week:    • Minutes of Exercise per Session:    Stress:    • Feeling of Stress :    Social Connections:    • Frequency of Communication with Friends and Family:    • Frequency of Social " Gatherings with Friends and Family:    • Attends [employer] Services:    • Active Member of Clubs or Organizations:    • Attends Club or Organization Meetings:    • Marital Status:    Intimate Partner Violence:    • Fear of Current or Ex-Partner:    • Emotionally Abused:    • Physically Abused:    • Sexually Abused:          MDM (Data Review):     Records reviewed and summarized in current documentation    Lab Data Review:  Recent Results (from the past 24 hour(s))   CBC WITH DIFFERENTIAL    Collection Time: 05/04/21  3:49 AM   Result Value Ref Range    WBC 7.7 4.8 - 10.8 K/uL    RBC 3.40 (L) 4.70 - 6.10 M/uL    Hemoglobin 7.9 (L) 14.0 - 18.0 g/dL    Hematocrit 26.5 (L) 42.0 - 52.0 %    MCV 77.9 (L) 81.4 - 97.8 fL    MCH 23.2 (L) 27.0 - 33.0 pg    MCHC 29.8 (L) 33.7 - 35.3 g/dL    RDW 48.7 35.9 - 50.0 fL    Platelet Count 286 164 - 446 K/uL    MPV 9.9 9.0 - 12.9 fL    Neutrophils-Polys 91.20 (H) 44.00 - 72.00 %    Lymphocytes 2.60 (L) 22.00 - 41.00 %    Monocytes 3.50 0.00 - 13.40 %    Eosinophils 2.70 0.00 - 6.90 %    Basophils 0.00 0.00 - 1.80 %    Nucleated RBC 0.00 /100 WBC    Neutrophils (Absolute) 7.02 1.82 - 7.42 K/uL    Lymphs (Absolute) 0.20 (L) 1.00 - 4.80 K/uL    Monos (Absolute) 0.27 0.00 - 0.85 K/uL    Eos (Absolute) 0.21 0.00 - 0.51 K/uL    Baso (Absolute) 0.00 0.00 - 0.12 K/uL    NRBC (Absolute) 0.00 K/uL    Hypochromia 1+     Anisocytosis 1+     Macrocytosis 1+     Microcytosis 1+    Comp Metabolic Panel    Collection Time: 05/04/21  3:49 AM   Result Value Ref Range    Sodium 136 135 - 145 mmol/L    Potassium 3.9 3.6 - 5.5 mmol/L    Chloride 106 96 - 112 mmol/L    Co2 20 20 - 33 mmol/L    Anion Gap 10.0 7.0 - 16.0    Glucose 85 65 - 99 mg/dL    Bun 16 8 - 22 mg/dL    Creatinine 0.65 0.50 - 1.40 mg/dL    Calcium 8.1 (L) 8.5 - 10.5 mg/dL    AST(SGOT) 54 (H) 12 - 45 U/L    ALT(SGPT) 35 2 - 50 U/L    Alkaline Phosphatase 118 (H) 30 - 99 U/L    Total Bilirubin 0.6 0.1 - 1.5 mg/dL    Albumin 2.9 (L) 3.2 -  4.9 g/dL    Total Protein 5.7 (L) 6.0 - 8.2 g/dL    Globulin 2.8 1.9 - 3.5 g/dL    A-G Ratio 1.0 g/dL   ESTIMATED GFR    Collection Time: 05/04/21  3:49 AM   Result Value Ref Range    GFR If African American >60 >60 mL/min/1.73 m 2    GFR If Non African American >60 >60 mL/min/1.73 m 2   DIFFERENTIAL MANUAL    Collection Time: 05/04/21  3:49 AM   Result Value Ref Range    Manual Diff Status PERFORMED    PERIPHERAL SMEAR REVIEW    Collection Time: 05/04/21  3:49 AM   Result Value Ref Range    Peripheral Smear Review see below    PLATELET ESTIMATE    Collection Time: 05/04/21  3:49 AM   Result Value Ref Range    Plt Estimation Normal    MORPHOLOGY    Collection Time: 05/04/21  3:49 AM   Result Value Ref Range    RBC Morphology Present     Polychromia 1+     Ovalocytes 1+     Echinocytes 2+        Imaging/Procedures Review:      EC-ECHOCARDIOGRAM LTD W/O CONT   Final Result      EC-ECHOCARDIOGRAM COMPLETE W/O CONT   Final Result      CT-ABDOMEN-PELVIS WITH   Final Result      1.  No acute inflammatory process in the abdomen or pelvis.   2.  Heterogeneous perfusion of the liver, most likely secondary to right heart dysfunction.   3.  Cardiomegaly and right atrial enlargement.   4.  Small right and trace left pleural effusions and associated atelectasis.   5.  Trabeculated bladder wall and multiple bladder diverticula, related to outlet obstruction.      [id number]   Final Result      1.  Poor dentition with multiple absent teeth.   2.  Periapical lucency is seen involving one of the left mandibular premolars.   3.  No mandibular fracture.      DX-CHEST-PORTABLE (1 VIEW)   Final Result      Left basilar opacity which could represent atelectasis and/or pneumonitis.      Mild interstitial opacities most likely represent chronic changes and/or mild vascular congestion.      CT-HEAD W/O   Final Result         1.  No acute intracranial process.      2. Diffuse atrophy and periventricular white matter changes  consistent with chronic small vessel disease. Encephalomalacia in the right frontal and left temporal lobes      DX-BARIUM ENEMA    (Results Pending)        MDM (Assessment and Plan):     Patient Active Problem List    Diagnosis Date Noted   • Acute ischemic left MCA stroke ([facility]) 06/06/2020   • Right shoulder pain 08/11/2016   • Bacteremia 05/17/2016   • Hyponatremia 08/11/2016   • Normocytic anemia 08/10/2016   • PAF (paroxysmal atrial fibrillation) ([facility]) 04/21/2015   • Sundowning 05/03/2021   • Sepsis ([facility]) 05/15/2016   • History of stroke 04/08/2015   • BPH (benign prostatic hyperplasia) 10/19/2011   • Allergic reaction 05/03/2021   • Microcytic anemia 05/01/2021   • Nonrheumatic aortic valve stenosis 06/08/2020   • 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 gallolyticus bacteremia on blood cultures x 2. Currently, on IV Cefazolin and Metrondiazole.  WBC improving 9.7 (14.4. on 5/2/21). CT scan abdomen/pelvis: no acute inflammatory process in the abdomen or pelvis. There is a correlation between Strep Gallolyticus bacteremia and colon neoplasm. Recommendations for colonoscopy. Needs a repeat TTE and restarted on Xarelto this morning.     ASSESSMENT/PLAN:  1. Strep gallolyticus bacteremia: Blood cultures x 2 positive for Strep. Gallolyticus. Correlation between Step. Gallolyticus and colon neoplasm.   2. Sepsis: WBC improving. Afebrile  2. CVA; patient is currently on Xarelto  3. Paroxysmal atrial fibrillation  4. Recurrent UTI: history of urinary retention   5. Severe aortic stenosis: Initial TTE was technically difficult and incomplete. ID recommended repeating TTE     PLAN:  1. Patient is down in radiology doing barium enema study. Patient's wife is requesting no further invasive  procedures including colonoscopy, TTE. Patient's wife would like to continue IV antibiotics only for patient.  2. May resume Xarelto  3. Advance diet as tolerated.  4. Continue IV antibiotics: appreciate ID recommendations  5. No plans for colonoscopy given age and comorbidities     **  GI WILL SIGN OFF / STAND BY - PLEASE CALL IF ANY CONCERNS  **    Thank your for the opportunity to assist in the care of your patient.  Please call for any questions or concerns.    Deo Casarez M.D. with  [facility]at[facility].

## 2021-05-04 NOTE — CARE PLAN
Problem: Communication  Goal: The ability to communicate needs accurately and effectively will improve  Outcome: PROGRESSING AS EXPECTED  Intervention: [place] patient and significant other/support system to call light to alert staff of needs  Flowsheets (Taken 5/4/2021 1033)  Oriented to:: All of the Following : Location of Bathroom, Visiting Policy, Unit Routine, Call Light and Bedside Controls, Bedside Rail Policy, Smoking Policy, Rights and Responsibilities, Bedside Report, and Patient Education Notebook  Note: Complete   Intervention: Reorient patient to environment as needed  Flowsheets (Taken 5/4/2021 1033)  Oriented to:: All of the Following : Location of Bathroom, Visiting Policy, Unit Routine, Call Light and Bedside Controls, Bedside Rail Policy, Smoking Policy, Rights and Responsibilities, Bedside Report, and Patient Education Notebook  Note: Complete      Problem: Safety  Goal: Will remain free from falls  Outcome: PROGRESSING AS EXPECTED  Intervention: Assess risk factors for falls  Note: Complete   Intervention: Implement fall precautions  Flowsheets  Taken 5/4/2021 1033  Bed Alarm: Yes - Alarm On  Taken 5/4/2021 0840  Environmental Precautions:   Treaded Slipper Socks on Patient   Personal Belongings, Wastebasket, Call Bell etc. in Easy Reach   Transferred to Stronger Side   Report Given to Other Health Care Providers Regarding Fall Risk   Bed in Low Position   Communication Sign for Patients & Families   Mobility Assessed & Appropriate Sign Placed  Note: Complete

## 2021-05-04 NOTE — PROGRESS NOTES
2 RN skin check complete with JAMIL Hickman.   Devices in place PIV, condom cath.  Skin assessed under devices yes.  Confirmed pressure ulcers found on N/A.  New potential pressure ulcers noted on N/A. Wound consult placed N/A.  The following interventions in place Pillows in use for support/positioning, waffle cushion, pillows in use to float heels, grey foam pads, silicone oxygen tubing, moisturizer     Bilateral ears: pink, intact, blanching  Bilateral elbows: pink, intact, blanching  Sacrum: pink, intact, blanching  Bilateral heels: pink, intact, blanching

## 2021-05-04 NOTE — PROGRESS NOTES
2 RN skin check complete w/ Camryn.   Devices in place silicone nasal cannula.  Skin assessed under devices pink, blanching.  Confirmed pressure ulcers found on NA.  New potential pressure ulcers noted on NA. Wound consult placed NA.  The following interventions in place waffle bed, pt turns in bed, heels floated, silicone NC w/ pads.

## 2021-05-04 NOTE — PROGRESS NOTES
Bedside report received from day shift RN. Assumed care at 1900. Pt is A&Ox1. Pt is in bed. Pt denies pain at this time. Pt was updated on plan of care. Pt has call light, personal belongings, and bedside table within reach. Bed is in the lowest position and bed alarm is on. Will continue to monitor

## 2021-05-04 NOTE — PROGRESS NOTES
Assumed care of pt @ 0715, bedside report received from JAMIL Guzmán.  Pt sleeping without any signs of pain.  Bed locked and lowered with call light within reach and questions answered during present time.

## 2021-05-05 LAB
ALBUMIN SERPL BCP-MCNC: 3 G/DL (ref 3.2–4.9)
ALBUMIN/GLOB SERPL: 1 G/DL
ALP SERPL-CCNC: 121 U/L (ref 30–99)
ALT SERPL-CCNC: 39 U/L (ref 2–50)
ANION GAP SERPL CALC-SCNC: 9 MMOL/L (ref 7–16)
AST SERPL-CCNC: 75 U/L (ref 12–45)
BILIRUB SERPL-MCNC: 0.5 MG/DL (ref 0.1–1.5)
BUN SERPL-MCNC: 13 MG/DL (ref 8–22)
CALCIUM SERPL-MCNC: 8.4 MG/DL (ref 8.5–10.5)
CHLORIDE SERPL-SCNC: 105 MMOL/L (ref 96–112)
CO2 SERPL-SCNC: 23 MMOL/L (ref 20–33)
CREAT SERPL-MCNC: 0.72 MG/DL (ref 0.5–1.4)
GLOBULIN SER CALC-MCNC: 2.9 G/DL (ref 1.9–3.5)
GLUCOSE BLD-MCNC: 93 MG/DL (ref 65–99)
GLUCOSE SERPL-MCNC: 75 MG/DL (ref 65–99)
POTASSIUM SERPL-SCNC: 3.6 MMOL/L (ref 3.6–5.5)
PROT SERPL-MCNC: 5.9 G/DL (ref 6–8.2)
SODIUM SERPL-SCNC: 137 MMOL/L (ref 135–145)

## 2021-05-05 PROCEDURE — 36415 COLL VENOUS BLD VENIPUNCTURE: CPT

## 2021-05-05 PROCEDURE — A9270 NON-COVERED ITEM OR SERVICE: HCPCS | Performed by: FAMILY MEDICINE

## 2021-05-05 PROCEDURE — 99233 SBSQ HOSP IP/OBS HIGH 50: CPT | Performed by: INTERNAL MEDICINE

## 2021-05-05 PROCEDURE — 92610 EVALUATE SWALLOWING FUNCTION: CPT

## 2021-05-05 PROCEDURE — 80053 COMPREHEN METABOLIC PANEL: CPT

## 2021-05-05 PROCEDURE — 700111 HCHG RX REV CODE 636 W/ 250 OVERRIDE (IP): Performed by: FAMILY MEDICINE

## 2021-05-05 PROCEDURE — 700111 HCHG RX REV CODE 636 W/ 250 OVERRIDE (IP): Performed by: INTERNAL MEDICINE

## 2021-05-05 PROCEDURE — 700101 HCHG RX REV CODE 250: Performed by: INTERNAL MEDICINE

## 2021-05-05 PROCEDURE — 700101 HCHG RX REV CODE 250: Performed by: FAMILY MEDICINE

## 2021-05-05 PROCEDURE — 770006 HCHG ROOM/CARE - MED/SURG/GYN SEMI*

## 2021-05-05 PROCEDURE — 700102 HCHG RX REV CODE 250 W/ 637 OVERRIDE(OP): Performed by: FAMILY MEDICINE

## 2021-05-05 RX ADMIN — METRONIDAZOLE 500 MG: 500 INJECTION, SOLUTION INTRAVENOUS at 22:21

## 2021-05-05 RX ADMIN — CEFAZOLIN SODIUM 2 G: 2 INJECTION, SOLUTION INTRAVENOUS at 22:21

## 2021-05-05 RX ADMIN — METRONIDAZOLE 500 MG: 500 INJECTION, SOLUTION INTRAVENOUS at 04:51

## 2021-05-05 RX ADMIN — QUETIAPINE FUMARATE 12.5 MG: 25 TABLET ORAL at 22:21

## 2021-05-05 RX ADMIN — CEFAZOLIN SODIUM 2 G: 2 INJECTION, SOLUTION INTRAVENOUS at 04:46

## 2021-05-05 RX ADMIN — CEFAZOLIN SODIUM 2 G: 2 INJECTION, SOLUTION INTRAVENOUS at 14:36

## 2021-05-05 RX ADMIN — METRONIDAZOLE 500 MG: 500 INJECTION, SOLUTION INTRAVENOUS at 14:41

## 2021-05-05 ASSESSMENT — FIBROSIS 4 INDEX: FIB4 SCORE: 4.03

## 2021-05-05 ASSESSMENT — ENCOUNTER SYMPTOMS
CHILLS: 0
FEVER: 0
COUGH: 0
ABDOMINAL PAIN: 0

## 2021-05-05 NOTE — PROGRESS NOTES
2 RN Skin Check    2 RN skin check complete.   Devices in place: Nasal Cannula.  Skin assessed under devices: yes.  Confirmed pressure ulcers found on: none.  New potential pressure ulcers noted on none. Wound consult placed No.  The following interventions in place Pillows, Lotion, Barrier cream, Waffle bed overlay and Waffle chair cushion, condom cath.      BL ears, elbows, heels, sacrum, CDI rad blanching.

## 2021-05-05 NOTE — CARE PLAN
Problem: Communication  Goal: The ability to communicate needs accurately and effectively will improve  Outcome: PROGRESSING AS EXPECTED  Intervention: Educate patient and significant other/support system about the plan of care, procedures, treatments, medications and allow for questions  Note: Patient is A&0X2. Responding more appropriately. Still impulsive.      Problem: Infection  Goal: Will remain free from infection  Outcome: PROGRESSING AS EXPECTED  Intervention: Assess signs and symptoms of infection  Note: Complete. No signs or symptoms of infection.   Intervention: Implement standard precautions and perform hand washing before and after patient contact  Note: Complete

## 2021-05-05 NOTE — DISCHARGE PLANNING
Anticipated Discharge Disposition: SNF    Action: Patient discussed in IDT rounds. He is not medically cleared. RN CM met with patient's spouse at bedside and she is still trying to consider whether she wants patient to go to SNF when he is medically cleared or home with home health. RN CM confirmed that [facility] not allowing any visitors so she does not want him to go there for sure. PT/OT hasn't worked with patient since 5/1 so she was wondering if they can work with patient again. RN CM discussed with bedside RN.      Barriers to Discharge: medical clearance, spouse may not want patient to go to snf    Plan: Case coordination to f/u with spouse and treatment team for discharge planning support

## 2021-05-05 NOTE — PROGRESS NOTES
Received evening report from day RN. Pt is confused, and has expressive asphasia. Pt's wife in room. Bed is locked in low position with call light and belongings within reach. POC discussed and all questions answered. All needs and requirements met at this time.

## 2021-05-05 NOTE — PROGRESS NOTES
Hospital Medicine Daily Progress Note    Date of Service  5/5/2021    Chief Complaint  Fever    Hospital Course  This is a 89 years old male who has past medical history of coronary artery disease, paroxysmal A. fib and stroke on Xarelto, history of severe aortic stenosis deemed not a candidate for intervention given patient frailty and lack of ability to recover from such a procedure, and history of recurrent UTI who has been experiencing fever and chills over the last few days.  Also patient has been feeling weak and have generalized body aches.  Presented to the hospital for further evaluation.  In ER noted to be afebrile.  Hemodynamically stable.  Blood work noted to have leukocytosis.  Patient does not report urinary symptoms.  However, per his wife he is usually asymptomatic with his history of previous UTI's.  UA was positive.  Started on antibiotics.  Chest x-ray showed left basilar atelectasis with no infiltrate or consolidation.  Blood culture came back positive for Streptococcus species.  ID consulted.    Interval Problem Update  Afebrile.  No event over night.  Discussed at length with wife and patient today.  They re-iterate that they do not desire more invasive procedure.  I mentioned Hospice.  His wife does not think that he is at that point right now.  I asked her to consider it as an option for the future.  She wants SNF that she can visit using public transportation.  If not possible, she wants to take pt home with HH.  I advised her that taking care of her  may be too taxing on her at this point, but we will see how he does with PT/OT/SLP evaluations.    Consultants/Specialty  ID  GI  Palliative Care    Code Status  Full Code    Disposition  Pending SNF acceptance.    Review of Systems  Review of Systems   Constitutional: Negative for chills and fever.   Respiratory: Negative for cough.    Cardiovascular: Negative for chest pain.   Gastrointestinal: Negative for abdominal pain.    Genitourinary: Negative for dysuria and urgency.        Physical Exam  Temp:  [35.8 °C (96.5 °F)-36.6 °C (97.8 °F)] 36.4 °C (97.6 °F)  Pulse:  [60-96] 60  Resp:  [16-18] 16  BP: ()/(61-90) 166/89  SpO2:  [94 %-98 %] 96 %    Physical Exam  Constitutional:       Appearance: Normal appearance.   HENT:      Head: Normocephalic.   Cardiovascular:      Rate and Rhythm: Normal rate.   Pulmonary:      Effort: Pulmonary effort is normal.      Breath sounds: Normal breath sounds.   Abdominal:      General: Abdomen is flat.   Neurological:      General: No focal deficit present.      Mental Status: He is alert.         Laboratory  Recent Labs     05/03/21  0604 05/04/21  0349   WBC 9.7 7.7   RBC 3.24* 3.40*   HEMOGLOBIN 7.6* 7.9*   HEMATOCRIT 24.9* 26.5*   MCV 76.9* 77.9*   MCH 23.5* 23.2*   MCHC 30.5* 29.8*   RDW 45.8 48.7   PLATELETCT 288 286   MPV 9.5 9.9     Recent Labs     05/03/21  0604 05/04/21  0349 05/05/21  0131   SODIUM 132* 136 137   POTASSIUM 3.4* 3.9 3.6   CHLORIDE 102 106 105   CO2 25 20 23   GLUCOSE 114* 85 75   BUN 19 16 13   CREATININE 0.84 0.65 0.72   CALCIUM 8.5 8.1* 8.4*                   Imaging  None recently     Assessment/Plan  * Bacteremia  Assessment & Plan  Blood cultures current Streptococcus gallolyticus 2/2 (Step bovis)   Pt and wife do not want any invasive procedure.  ID recs course of abx is noted.  At discharge, switch to Levaquin 750mg daily until 5/12.  Palliative Care following for Advance Care planing.        PAF (paroxysmal atrial fibrillation) (HCC)- (present on admission)  Assessment & Plan  Rate controlled (not on rate control medications).  Restart Xarelto.      Allergic reaction- (present on admission)  Assessment & Plan  Developed allergic reaction to Unasyn resolved with Benadryl.  Switch to Ancef and Flagyl.      Microcytic anemia- (present on admission)  Assessment & Plan  Low iron level and saturation.  Start replacement.  Monitor H&H.    Recurrent UTI- (present on  admission)  Assessment & Plan  Likely induced by self-catheterization.  Urine culture negative so far.    Sundowning- (present on admission)  Assessment & Plan  Avoid benzodiazepines, anticholinergics  Minimize hypnotics, sedatives, narcotics.  Minimize lines.  Daily mentation.  Started low-dose Seroquel.    History of stroke- (present on admission)  Assessment & Plan  With some residual speech deficits, consider aspiration as etiology as well, follow-up chest x-ray  PT OT SLP  Pending SNF  Xarelto resume as no invasive procedure planned.        VTE prophylaxis: Xarleto

## 2021-05-05 NOTE — PROGRESS NOTES
Assumed care of pt @ 0715, bedside report received from JAMIL Quarles.  Pt A&OX1 and denies any pain. Bed locked and lowered with call light within reach and questions answered during present time.

## 2021-05-05 NOTE — CARE PLAN
Problem: Safety  Goal: Will remain free from injury  Outcome: PROGRESSING AS EXPECTED     Problem: Communication  Goal: The ability to communicate needs accurately and effectively will improve  Outcome: PROGRESSING SLOWER THAN EXPECTED     Problem: Bowel/Gastric:  Goal: Normal bowel function is maintained or improved  Outcome: PROGRESSING SLOWER THAN EXPECTED     Problem: Knowledge Deficit  Goal: Knowledge of disease process/condition, treatment plan, diagnostic tests, and medications will improve  Outcome: PROGRESSING SLOWER THAN EXPECTED    Pt unable to correctly understand POC, wife at bedside helps to adequately portray care for pt.

## 2021-05-05 NOTE — THERAPY
"Speech Language Pathology   Clinical Swallow Evaluation     Patient Name: Alistair Tavares  AGE:  89 y.o., SEX:  male  Medical Record #: [id number]  Today's Date: 5/5/2021     Precautions  Precautions: Fall Risk, Other (See Comments), Swallow Precautions ( See Comments)    Assessment  Patient is 89 y.o. male admitted 4/30 with chills, cough, abdominal pain. Found to have recurrent UTI and sepsis. PMHx of stroke last year, cataract, CAD, pneumonia, falls. CXR 4/30 reports: \"Left basilar opacity which could represent atelectasis and/or pneumonitis. Mild interstitial opacities most likely represent chronic changes and/or mild vascular congestion.\" Last seen by SLP in June 2020 w/ recs for SB6/TN0 diet and for aphasia tx.     Patient seen this date for clinical swallow evaluation. Per RN, patient was on clear liquid diet pending test yesterday, but was made NPO after test d/t coughing noted while drinking chicken broth yesterday. Patient NPO pending evaluation today. Patient awake, alert, and noted to have mild dysarthria, but speech/language seems much improved compared to SLP notes from last year. Per RN and EMR, patient and wife do not want any invasive measures. Patient unable to complete formal oral motor evaluation, but upon inspection, no gross deficits noted with the exception of missing dentition and moderate amount of white, thick mucous in oral cavity that was removed by this clinician during oral care. Patient consumed PO trials of single ice chips, MTL via tsp, cup sip, and straw, purees, pudding, soft solids, and thin liquids via cup sip and straw. Patient's vocal quality was noted to be minimally gravely. PO trials of MTL via straw and thins via cup sip were given w/ intermittent throat clearing noted, which is concerning for penetration/aspiration. Patient often attempted to \"swish\" liquid and had prolonged oral holding of up to 8 seconds at times, which increases risk for penetration/aspiration. Patient " was educated on importance of timely swallow, but could not trigger a faster swallow despite cues. Patient also swallow 3-4 times on cup sip of thin liquids.     At this time, recommend patient start SB6/MT2 diet with 1:1 feeding. Crush or float meds in puree as tolerated. No straws. SLP to follow.     Plan  Recommend Speech Therapy 3 times per week until therapy goals are met for the following treatments:  Dysphagia training and pt/family training     Discharge Recommendations: Recommend post-acute placement for additional speech therapy services prior to discharge home     Objective     05/05/21 0943   Precautions   Precautions Fall Risk;Other (See Comments);Swallow Precautions ( See Comments)   Vitals   O2 (LPM) 2   O2 Delivery Device Silicone Nasal Cannula   Oral Motor Eval    Is Patient Able to Complete Oral Motor Eval No   Barriers To Oral Feeding   Barriers To Oral Feeding Generalized Weakness;Impaired Cognition / Attention   Laryngeal Function   Voice Quality Minimal  (Gravely )   Volutional Cough Within Functional Limits   Excursion Upon Swallow Weak    Max Phonation Time (Seconds) 5   Oral Food Presentation   Ice Chips Minimal   Single Swallow Mildly Thick (2) - (Nectar Thick)  Within Functional Limits   Serial Swallow Mildly Thick (2) - (Nectar Thick) Within Functional Limits   Single Swallow Thin (0) Moderate   Liquidised (3) Within Functional Limits   Pureed (4) Within Functional Limits   Soft & Bite-Sized (6) - (Dysphagia III) Within Functional Limits   Self Feeding Needs Total Assistance   Dysphagia Strategies / Recommendations   Strategies / Interventions Recommended (Yes / No) Yes   Compensatory Strategies Strict 1:1 Feeding;Head of Bed 90 Degrees During Eating / Drinking;No Straws;Single Sips / Bites   Diet / Liquid Recommendation Soft & Bite-Sized (6) - (Dysphagia III);Mildly Thick (2) - (Nectar Thick)   Medication Administration  Float Whole with Puree;Crush all Medications in Puree  (As  tolerated )   Therapy Interventions Dysphagia Therapy By Speech Language Pathologist   Short Term Goals   Short Term Goal # 1 Patient will consume SB6/MT2 diet with 1:1 feeding with no overt s/sx of aspiration.    Anticipated Discharge Needs   Discharge Recommendations Recommend post-acute placement for additional speech therapy services prior to discharge home

## 2021-05-06 LAB
ALBUMIN SERPL BCP-MCNC: 3.2 G/DL (ref 3.2–4.9)
ALBUMIN/GLOB SERPL: 1 G/DL
ALP SERPL-CCNC: 131 U/L (ref 30–99)
ALT SERPL-CCNC: 35 U/L (ref 2–50)
ANION GAP SERPL CALC-SCNC: 11 MMOL/L (ref 7–16)
AST SERPL-CCNC: 91 U/L (ref 12–45)
BILIRUB SERPL-MCNC: 0.6 MG/DL (ref 0.1–1.5)
BUN SERPL-MCNC: 11 MG/DL (ref 8–22)
CALCIUM SERPL-MCNC: 8.6 MG/DL (ref 8.5–10.5)
CHLORIDE SERPL-SCNC: 106 MMOL/L (ref 96–112)
CO2 SERPL-SCNC: 21 MMOL/L (ref 20–33)
CREAT SERPL-MCNC: 0.8 MG/DL (ref 0.5–1.4)
GLOBULIN SER CALC-MCNC: 3.1 G/DL (ref 1.9–3.5)
GLUCOSE SERPL-MCNC: 104 MG/DL (ref 65–99)
POTASSIUM SERPL-SCNC: 3.5 MMOL/L (ref 3.6–5.5)
PROT SERPL-MCNC: 6.3 G/DL (ref 6–8.2)
SODIUM SERPL-SCNC: 138 MMOL/L (ref 135–145)

## 2021-05-06 PROCEDURE — 700101 HCHG RX REV CODE 250: Performed by: INTERNAL MEDICINE

## 2021-05-06 PROCEDURE — 700102 HCHG RX REV CODE 250 W/ 637 OVERRIDE(OP): Performed by: INTERNAL MEDICINE

## 2021-05-06 PROCEDURE — 770006 HCHG ROOM/CARE - MED/SURG/GYN SEMI*

## 2021-05-06 PROCEDURE — 97530 THERAPEUTIC ACTIVITIES: CPT | Mod: CQ

## 2021-05-06 PROCEDURE — 700111 HCHG RX REV CODE 636 W/ 250 OVERRIDE (IP): Performed by: INTERNAL MEDICINE

## 2021-05-06 PROCEDURE — 80053 COMPREHEN METABOLIC PANEL: CPT

## 2021-05-06 PROCEDURE — 97530 THERAPEUTIC ACTIVITIES: CPT

## 2021-05-06 PROCEDURE — A9270 NON-COVERED ITEM OR SERVICE: HCPCS | Performed by: FAMILY MEDICINE

## 2021-05-06 PROCEDURE — 700102 HCHG RX REV CODE 250 W/ 637 OVERRIDE(OP): Performed by: FAMILY MEDICINE

## 2021-05-06 PROCEDURE — 36415 COLL VENOUS BLD VENIPUNCTURE: CPT

## 2021-05-06 PROCEDURE — A9270 NON-COVERED ITEM OR SERVICE: HCPCS | Performed by: INTERNAL MEDICINE

## 2021-05-06 PROCEDURE — A9270 NON-COVERED ITEM OR SERVICE: HCPCS | Performed by: HOSPITALIST

## 2021-05-06 PROCEDURE — 700102 HCHG RX REV CODE 250 W/ 637 OVERRIDE(OP): Performed by: HOSPITALIST

## 2021-05-06 PROCEDURE — 97116 GAIT TRAINING THERAPY: CPT | Mod: CQ

## 2021-05-06 PROCEDURE — 99233 SBSQ HOSP IP/OBS HIGH 50: CPT | Performed by: INTERNAL MEDICINE

## 2021-05-06 PROCEDURE — 700105 HCHG RX REV CODE 258: Performed by: FAMILY MEDICINE

## 2021-05-06 RX ORDER — METRONIDAZOLE 500 MG/1
500 TABLET ORAL EVERY 8 HOURS
Status: DISCONTINUED | OUTPATIENT
Start: 2021-05-06 | End: 2021-05-07 | Stop reason: HOSPADM

## 2021-05-06 RX ORDER — POTASSIUM CHLORIDE 20 MEQ/1
40 TABLET, EXTENDED RELEASE ORAL DAILY
Status: DISCONTINUED | OUTPATIENT
Start: 2021-05-06 | End: 2021-05-07 | Stop reason: HOSPADM

## 2021-05-06 RX ADMIN — METRONIDAZOLE 500 MG: 500 INJECTION, SOLUTION INTRAVENOUS at 05:07

## 2021-05-06 RX ADMIN — QUETIAPINE FUMARATE 12.5 MG: 25 TABLET ORAL at 22:10

## 2021-05-06 RX ADMIN — RIVAROXABAN 15 MG: 15 TABLET, FILM COATED ORAL at 05:07

## 2021-05-06 RX ADMIN — SODIUM CHLORIDE: 9 INJECTION, SOLUTION INTRAVENOUS at 05:06

## 2021-05-06 RX ADMIN — CEFAZOLIN SODIUM 2 G: 2 INJECTION, SOLUTION INTRAVENOUS at 22:10

## 2021-05-06 RX ADMIN — METRONIDAZOLE 500 MG: 500 INJECTION, SOLUTION INTRAVENOUS at 14:37

## 2021-05-06 RX ADMIN — CEFAZOLIN SODIUM 2 G: 2 INJECTION, SOLUTION INTRAVENOUS at 14:37

## 2021-05-06 RX ADMIN — CEFAZOLIN SODIUM 2 G: 2 INJECTION, SOLUTION INTRAVENOUS at 05:06

## 2021-05-06 RX ADMIN — METRONIDAZOLE 500 MG: 500 TABLET ORAL at 22:10

## 2021-05-06 RX ADMIN — FINASTERIDE 5 MG: 5 TABLET, FILM COATED ORAL at 05:07

## 2021-05-06 RX ADMIN — FAMOTIDINE 20 MG: 20 TABLET ORAL at 05:07

## 2021-05-06 ASSESSMENT — COGNITIVE AND FUNCTIONAL STATUS - GENERAL
TOILETING: A LOT
HELP NEEDED FOR BATHING: A LOT
DRESSING REGULAR LOWER BODY CLOTHING: TOTAL
TURNING FROM BACK TO SIDE WHILE IN FLAT BAD: A LOT
MOVING TO AND FROM BED TO CHAIR: UNABLE
WALKING IN HOSPITAL ROOM: A LOT
CLIMB 3 TO 5 STEPS WITH RAILING: A LOT
MOBILITY SCORE: 11
MOVING FROM LYING ON BACK TO SITTING ON SIDE OF FLAT BED: A LOT
STANDING UP FROM CHAIR USING ARMS: A LOT
DRESSING REGULAR UPPER BODY CLOTHING: A LOT
PERSONAL GROOMING: A LITTLE
SUGGESTED CMS G CODE MODIFIER MOBILITY: CL
EATING MEALS: A LITTLE
DAILY ACTIVITIY SCORE: 13
SUGGESTED CMS G CODE MODIFIER DAILY ACTIVITY: CL

## 2021-05-06 ASSESSMENT — LIFESTYLE VARIABLES
CONSUMPTION TOTAL: NEGATIVE
TOTAL SCORE: 0
ALCOHOL_USE: NO
HOW MANY TIMES IN THE PAST YEAR HAVE YOU HAD 5 OR MORE DRINKS IN A DAY: 0
HAVE YOU EVER FELT YOU SHOULD CUT DOWN ON YOUR DRINKING: NO
TOTAL SCORE: 0
ON A TYPICAL DAY WHEN YOU DRINK ALCOHOL HOW MANY DRINKS DO YOU HAVE: 0
HAVE PEOPLE ANNOYED YOU BY CRITICIZING YOUR DRINKING: NO
TOTAL SCORE: 0
DOES PATIENT WANT TO STOP DRINKING: NO
EVER FELT BAD OR GUILTY ABOUT YOUR DRINKING: NO
EVER HAD A DRINK FIRST THING IN THE MORNING TO STEADY YOUR NERVES TO GET RID OF A HANGOVER: NO
AVERAGE NUMBER OF DAYS PER WEEK YOU HAVE A DRINK CONTAINING ALCOHOL: 0

## 2021-05-06 ASSESSMENT — ENCOUNTER SYMPTOMS
ABDOMINAL PAIN: 0
CHILLS: 0
COUGH: 0
FEVER: 0

## 2021-05-06 ASSESSMENT — GAIT ASSESSMENTS
DEVIATION: DECREASED BASE OF SUPPORT;DECREASED HEEL STRIKE;DECREASED TOE OFF
GAIT LEVEL OF ASSIST: MODERATE ASSIST
ASSISTIVE DEVICE: FRONT WHEEL WALKER
DISTANCE (FEET): 30

## 2021-05-06 NOTE — PROGRESS NOTES
Assumed care of pt, received bedside report from JAMIL Murillo. Pt sleeping, no complaints of pain, 2L O2 nasal cannula. Fall and safety precautions in place, strip alarm in place. Discussed POC with pt, pt verbalizes understanding. No further needs at this time.

## 2021-05-06 NOTE — DISCHARGE PLANNING
Received Choice form at 1320  Agency/Facility Name: [facility]  Referral sent per Choice form @ 1325    RN JOSI informed

## 2021-05-06 NOTE — CARE PLAN
Problem: Pain Management  Goal: Pain level will decrease to patient's comfort goal  Outcome: PROGRESSING AS EXPECTED  Intervention: Follow pain managment plan developed in collaboration with patient and Interdisciplinary Team  Note: Pt reports no pain, rest and repositioning done to keep pt comfortable.      Problem: Knowledge Deficit  Goal: Knowledge of the prescribed therapeutic regimen will improve  Outcome: PROGRESSING AS EXPECTED  Note: MD and Rn educated and updated wife and family on prognosis/options for pt's future care.

## 2021-05-06 NOTE — DISCHARGE PLANNING
Anticipated Discharge Disposition: home with hospice    Action: RN CM met with patient's spouse at bedside to discuss hospice and she has decided she wants to take patient home as soon as possible. She realizes that he needs a lot of assistance and she states that he has some money that she can use to hire caregiver support at home. She signed consent for [facility], choice faxed to [facility]. He will need hospital bed. JAMIL PRATER called Elizabeth with [facility] and updated her on referral. [facility] has home care services as well that patient can pay for at a reduced cost if they are on service with [facility] for hospice.  Corinne will have Amanda Damian reach out to spouse as soon as she receives and reviews referral.     Barriers to Discharge: hospice acceptance, DME    Plan: Case coordination to f/u with hospice regarding referral    1536-Ariela with [facility] spoke with patient's spouse and they are having equipment delivered this evening with a plan to discharge patient home tomorrow.

## 2021-05-06 NOTE — CARE PLAN
Problem: Nutritional:  Goal: Achieve adequate nutritional intake  Description: Patient will consume 25-50% of meals and supplements  Outcome: PROGRESSING SLOWER THAN EXPECTED

## 2021-05-06 NOTE — DIETARY
Nutrition Services: Update   Day 6 of admit. Alistair Tavares is a 89 y.o. male with admitting DX of sepsis.    Pt seen for re-screen to ensure diet advance. Pt is currently on soft and bite sized diet and mildly thick liquids which was recommended by SLP on 5/5. Diet advanced from NPO on 5/5. Pt was on a regular diet from 4/30 to 5/3. PO intake appeared low since last RD visit upon review. PO <25% x 2 meals per flow sheet since 5/5.     Pt asleep when this RD attempted to visit. Wife at bedside reports pt doesn't like diet texture and thickened liquids. Breakfast tray seen with with 1/2 the eggs consumed. Wife stated she brought laney slices from home that pt consumed. Wife agreeable to THICK Boost TID.     Per palliative RN note today: Plan is for pt to go home with [facility] hospice care. POLST completed today indicates no artifical nutrition.     Wt 5/5: 74 kg (163 lb 2.3 oz) via bed scale.     Malnutrition Risk: No criteria met at this time.     Recommendations/Plan:  1. Will add THICK Boost Plus TID per poor PO protocol.  2. Encourage intake of meals/supplements.  3. Document intake of all meals/supplements as % taken in ADL's to provide interdisciplinary communication across all shifts.   4. Monitor weight.  5. Nutrition rep will continue to see patient for ongoing meal and snack preferences.    RD following.

## 2021-05-06 NOTE — CARE PLAN
Problem: Safety  Goal: Will remain free from falls  Outcome: PROGRESSING AS EXPECTED   Pt A&Ox1 to self, requiring reorientation. Pt family member at bedside. Discussed appropriate fall precautions as pt is high fall risk. Family member verbalized understanding. Fall precautions in place, Bed alarm on and working appropriately.   Problem: Knowledge Deficit  Goal: Knowledge of disease process/condition, treatment plan, diagnostic tests, and medications will improve  Outcome: PROGRESSING AS EXPECTED   POC discussed with Pts family member at bedside. Discussed D/C planning. All questions answered at this time.

## 2021-05-06 NOTE — PALLIATIVE CARE
Palliative Care follow-up  Met with Marisa ZUÑIGA and she stated that wife wants to bring pt home with [facility]. She was tearful and PC RN reviewed with wife the benefits of hospice care in the home. Rekha agreed to change Alistair to DNR/DNR and competed a POLST with DNR comfort care and no artificial nutrition. Copy scanned and hard copy placed over head of bed to go home with pt at time of D/C       Updated: JAMIL, Dr. Fernandez and Marisa NEGRON CM    Plan: Home with [facility] care    Thank you for allowing Palliative Care to support this patient and family. Contact [phone] for additional assistance, change in patient status, or with any questions/concerns.

## 2021-05-06 NOTE — PROGRESS NOTES
Hospital Medicine Daily Progress Note    Date of Service  5/6/2021    Chief Complaint  Fever    Hospital Course  This is a 89 years old male who has past medical history of coronary artery disease, paroxysmal A. fib and stroke on Xarelto, history of severe aortic stenosis deemed not a candidate for intervention given patient frailty and lack of ability to recover from such a procedure, and history of recurrent UTI who has been experiencing fever and chills over the last few days.  Also patient has been feeling weak and have generalized body aches.  Presented to the hospital for further evaluation.  In ER noted to be afebrile.  Hemodynamically stable.  Blood work noted to have leukocytosis.  Patient does not report urinary symptoms.  However, per his wife he is usually asymptomatic with his history of previous UTI's.  UA was positive.  Started on antibiotics.  Chest x-ray showed left basilar atelectasis with no infiltrate or consolidation.  Blood culture came back positive for Streptococcus species.  ID consulted.    Interval Problem Update  Afebrile.  No event over night.    Patient was seen with wife at bedside.  She asked about hospice, which I explained to her regarding so, benefits, etc.  She has if she can take him home today with hospice.  This is dependent on whether or not we can get a hospice agency to accept him today, which I think is likely unlikely but the  will retry.  She asked if she can undergo him from hospice, which is ultimately up to her at any given procedure.    She wanted to take him home today call her whether or not we can get him in on hospice, but I advised her that I think this decision would not serve her  her well given that I think he will need a lot of assistance and without adequate time, we cannot assist her with finding the appropriate hospice agency that will help her after discharge.  I presented skilled nursing discharge to her again, but she said she is not  interested in this option and only wants to take her  home.  She also again reiterates that both her  and her are not interested in any more diagnostic or invasive procedure.    Hospice referral was placed and discussed with the medical social worker/case management.    Consultants/Specialty  ID  GI  Palliative Care    Code Status  DNAR/DNI patient was seen by palliative care service.  Advanced directive was signed.  He and wife decided on changing his CODE STATUS to DNI DNR.    Disposition  Pending SNF acceptance.    Review of Systems  Review of Systems   Constitutional: Negative for chills and fever.   Respiratory: Negative for cough.    Cardiovascular: Negative for chest pain.   Gastrointestinal: Negative for abdominal pain.   Genitourinary: Negative for dysuria and urgency.        Physical Exam  Temp:  [36.1 °C (96.9 °F)-37.2 °C (98.9 °F)] 36.1 °C (96.9 °F)  Pulse:  [83-90] 90  Resp:  [18] 18  BP: (133-143)/(81-85) 137/81  SpO2:  [95 %-98 %] 98 %    Physical Exam  Constitutional:       Appearance: Normal appearance.   HENT:      Head: Normocephalic.   Cardiovascular:      Rate and Rhythm: Normal rate.   Pulmonary:      Effort: Pulmonary effort is normal.      Breath sounds: Normal breath sounds.   Abdominal:      General: Abdomen is flat.   Neurological:      General: No focal deficit present.      Mental Status: He is alert.      Comments: Patient is more sleepy today.         Laboratory  Recent Labs     05/04/21  0349   WBC 7.7   RBC 3.40*   HEMOGLOBIN 7.9*   HEMATOCRIT 26.5*   MCV 77.9*   MCH 23.2*   MCHC 29.8*   RDW 48.7   PLATELETCT 286   MPV 9.9     Recent Labs     05/04/21  0349 05/05/21  0131 05/06/21  0941   SODIUM 136 137 138   POTASSIUM 3.9 3.6 3.5*   CHLORIDE 106 105 106   CO2 20 23 21   GLUCOSE 85 75 104*   BUN 16 13 11   CREATININE 0.65 0.72 0.80   CALCIUM 8.1* 8.4* 8.6                   Imaging  None recently     Assessment/Plan  * Bacteremia  Assessment & Plan  Blood cultures  current Streptococcus gallolyticus 2/2 (Step bovis)   Pt and wife do not want any invasive procedure.  ID recs course of abx is noted.  At discharge, switch to Levaquin 750mg daily until 5/12.  Palliative Care following for Advance Care planing.        PAF (paroxysmal atrial fibrillation) (HCC)- (present on admission)  Assessment & Plan  Rate controlled (not on rate control medications).  Restart Xarelto.      Allergic reaction- (present on admission)  Assessment & Plan  Developed allergic reaction to Unasyn resolved with Benadryl.  Switch to Ancef and Flagyl.      Microcytic anemia- (present on admission)  Assessment & Plan  Low iron level and saturation.  Start replacement.  Monitor H&H.    Recurrent UTI- (present on admission)  Assessment & Plan  Likely induced by self-catheterization.  Urine culture negative so far.    Sundowning- (present on admission)  Assessment & Plan  Avoid benzodiazepines, anticholinergics  Minimize hypnotics, sedatives, narcotics.  Minimize lines.  Daily mentation.  Started low-dose Seroquel.    History of stroke- (present on admission)  Assessment & Plan  With some residual speech deficits, consider aspiration as etiology as well, follow-up chest x-ray  PT OT SLP  Pending SNF  Xarelto resume as no invasive procedure planned.        VTE prophylaxis: Xarleto

## 2021-05-06 NOTE — PROGRESS NOTES
2 RN Skin Check    2 RN skin check complete with JAMIL Rider.   Devices in place: Nasal Cannula.  Skin assessed under devices: yes.  Confirmed pressure ulcers found on: n/a.  New potential pressure ulcers noted on n/a. Wound consult placed No.  The following interventions in place Pillows, Lotion and Waffle bed overlay.    Ears are clean dry and intact.   Elbows are clean dry and intact.  Sacrum is pink and blanching.   Heels are clean dry and intact.

## 2021-05-06 NOTE — THERAPY
Occupational Therapy  Daily Treatment     Patient Name: Alistair Tavares  Age:  89 y.o., Sex:  male  Medical Record #: [id number]  Today's Date: 5/6/2021     Precautions  Precautions: Fall Risk, Swallow Precautions ( See Comments)    Assessment     Pt currently limited by decreased functional mobility, activity tolerance, cognition, coordination, balance, and pain which are affecting pt's ability to complete ADLs/IADLs at baseline. Pt would benefit from OT services in the acute care setting to maximize functional recovery.     Plan    Continue current treatment plan.    DC Equipment Recommendations: Unable to determine at this time  Discharge Recommendations: (P) Recommend post-acute placement for additional occupational therapy services prior to discharge home(could go home if pt had 24/7 phyical suport)       05/06/21 0855   Activities of Daily Living   Upper Body Dressing Moderate Assist   Lower Body Dressing Maximal Assist   Toileting Maximal Assist   Functional Mobility   Sit to Stand Moderate Assist   Bed, Chair, Wheelchair Transfer Moderate Assist   Short Term Goals   Short Term Goal # 1 Xfer to chair without LOB with supervision   Goal Outcome # 1 Goal not met   Short Term Goal # 2 Toileting with supervision   Goal Outcome # 2 Goal not met

## 2021-05-06 NOTE — PROGRESS NOTES
Bedside report received. PT A&Ox1, to self. POC discussed with pt's wife; all questions answered at this time. Fall precautions in place bed alarm on and working appropriately. Q2hr turns in place. Call light within reach.

## 2021-05-06 NOTE — THERAPY
Physical Therapy   Daily Treatment     Patient Name: Alistair Tavares  Age:  89 y.o., Sex:  male  Medical Record #: [id number]  Today's Date: 5/6/2021     Precautions: Fall Risk, Swallow Precautions ( See Comments)    Assessment    Pt pleasant, presents with poor safety awareness and insight into deficits can be self directed at time. He requires max cues for safety with all mobility and wait for therapist as he is impulsive. He required Ivett for STS transfers with fww and modA for balance and sequencing when turning. He completed gait training but required modA for balance and fww management. Pt pushing fww too far forward increasing his risk for falling. He has two flights of stairs at home and required modA and second person for safety on stair training today. He demonstrated poor carryover of instructions or sequencing. Extensive discussion with wife regarding assist levels at home. He would benefit from post acute placement however pt will need 24/7 physical assist at home and medical transport up stairs into home. Will continue to follow while in house.     Plan    Continue current treatment plan.    DC Equipment Recommendations: Unable to determine at this time  Discharge Recommendations: Recommend post-acute placement for additional physical therapy services prior to discharge home         05/06/21 0915   Other Treatments   Other Treatments Provided extensive discussion with family regarding assist at home and safety    Balance   Sitting Balance (Static) Fair -   Sitting Balance (Dynamic) Poor +   Standing Balance (Static) Poor +   Standing Balance (Dynamic) Poor   Weight Shift Sitting Fair   Weight Shift Standing Poor   Gait Analysis   Gait Level Of Assist Moderate Assist   Assistive Device Front Wheel Walker   Distance (Feet) 30   # of Times Distance was Traveled 2   Deviation Decreased Base Of Support;Decreased Heel Strike;Decreased Toe Off   # of Stairs Climbed 3   Level of Assist with Stairs Moderate  Assist   Comments modA for balance and safety. Pt with poor carryover keeping fww too far forward. Stair training modA for balance and step by step cues.    Bed Mobility    Supine to Sit Minimal Assist   Sit to Supine Minimal Assist   Scooting Minimal Assist   Comments hob flat and bed rail.    Functional Mobility   Sit to Stand Minimal Assist   Bed, Chair, Wheelchair Transfer Moderate Assist   Skilled Intervention Verbal Cuing;Sequencing   Comments Ivett for balance with fww. ModA for seqeuncing and step by step cues for tunring during transfers    Short Term Goals    Short Term Goal # 1 pt will be able to perform supine<>sit with HOB flat/no rails with Spv in 6tx to promote fnx progression towads I    Goal Outcome # 1 goal not met   Short Term Goal # 2 Pt will be able to perform sit<>stand/transferes iwth FWW with Spv in 6tx to promote fnx progression towards I    Goal Outcome # 2 Goal not met   Short Term Goal # 3 Pt will be able to ambulate 150ft with FWW with SPv in 6tx to promote fnx progression towards I    Goal Outcome # 3 Goal not met   Short Term Goal # 4 Pt will be able to ambulate up/down FOS with rail with Spv in 6tx to promote dc to home plan   Goal Outcome # 4 Goal not met

## 2021-05-06 NOTE — PALLIATIVE CARE
Palliative Care follow-up  Met with pt and he was very confused could not id why he was in the hospital and where he was at. He mentioned that the angels came to his bedside last night. When asked if they were scary he said no they were peaceful. Wife came in to sit with him. She met with the MD and then RN rounded after the conversation. Rekha stated that she was not able to make a decision about his code status at this time, she needs more time to think. Rekha mention that the MD also had the same discussion with her that we addressed yesterday.      Updated: Pt RN updated.     Plan: Continue to follow for GOC and support.     Thank you for allowing Palliative Care to support this patient and family. Contact [phone] for additional assistance, change in patient status, or with any questions/concerns.

## 2021-05-06 NOTE — PROGRESS NOTES
2 RN skin check complete for Jose score of 16.   Devices in place PIV and NC.  Skin assessed under devices intact.  Confirmed pressure ulcers found on none.  New potential pressure ulcers noted on none. Wound consult placed NA.  The following interventions in place q2hr turns. Barrier cream to sacrum, mepilex on bilat elbows. Pillows for support and comfort. Silicon NC with grey foam pads.     Bilat Ears intact, red and blanching grey foam pads in place   Bilat elbows intact red and blanching, mepilex applied. Generalized bruising to R forearm.   Sacrum intact, pink and blanching.   Bilat heels intact, dry, pink and blanching.

## 2021-05-07 ENCOUNTER — PHARMACY VISIT (OUTPATIENT)
Dept: PHARMACY | Facility: MEDICAL CENTER | Age: 86
End: 2021-05-07
Payer: MEDICARE

## 2021-05-07 VITALS
RESPIRATION RATE: 18 BRPM | SYSTOLIC BLOOD PRESSURE: 146 MMHG | OXYGEN SATURATION: 93 % | BODY MASS INDEX: 22.1 KG/M2 | DIASTOLIC BLOOD PRESSURE: 83 MMHG | HEIGHT: 72 IN | WEIGHT: 163.14 LBS | TEMPERATURE: 98.8 F | HEART RATE: 92 BPM

## 2021-05-07 LAB
BACTERIA BLD CULT: NORMAL
BACTERIA BLD CULT: NORMAL
SIGNIFICANT IND 70042: NORMAL
SIGNIFICANT IND 70042: NORMAL
SITE SITE: NORMAL
SITE SITE: NORMAL
SOURCE SOURCE: NORMAL
SOURCE SOURCE: NORMAL

## 2021-05-07 PROCEDURE — 700102 HCHG RX REV CODE 250 W/ 637 OVERRIDE(OP): Performed by: HOSPITALIST

## 2021-05-07 PROCEDURE — 700102 HCHG RX REV CODE 250 W/ 637 OVERRIDE(OP): Performed by: INTERNAL MEDICINE

## 2021-05-07 PROCEDURE — A9270 NON-COVERED ITEM OR SERVICE: HCPCS | Performed by: HOSPITALIST

## 2021-05-07 PROCEDURE — 700111 HCHG RX REV CODE 636 W/ 250 OVERRIDE (IP): Performed by: INTERNAL MEDICINE

## 2021-05-07 PROCEDURE — 94760 N-INVAS EAR/PLS OXIMETRY 1: CPT

## 2021-05-07 PROCEDURE — A9270 NON-COVERED ITEM OR SERVICE: HCPCS | Performed by: INTERNAL MEDICINE

## 2021-05-07 PROCEDURE — 700102 HCHG RX REV CODE 250 W/ 637 OVERRIDE(OP): Performed by: FAMILY MEDICINE

## 2021-05-07 PROCEDURE — RXMED WILLOW AMBULATORY MEDICATION CHARGE: Performed by: INTERNAL MEDICINE

## 2021-05-07 PROCEDURE — A9270 NON-COVERED ITEM OR SERVICE: HCPCS | Performed by: FAMILY MEDICINE

## 2021-05-07 PROCEDURE — 99239 HOSP IP/OBS DSCHRG MGMT >30: CPT | Performed by: INTERNAL MEDICINE

## 2021-05-07 RX ORDER — LEVOFLOXACIN 750 MG/1
750 TABLET, FILM COATED ORAL DAILY
Qty: 5 TABLET | Refills: 0 | Status: SHIPPED | OUTPATIENT
Start: 2021-05-07 | End: 2021-05-12

## 2021-05-07 RX ADMIN — METRONIDAZOLE 500 MG: 500 TABLET ORAL at 06:12

## 2021-05-07 RX ADMIN — FINASTERIDE 5 MG: 5 TABLET, FILM COATED ORAL at 08:12

## 2021-05-07 RX ADMIN — FAMOTIDINE 20 MG: 20 TABLET ORAL at 08:12

## 2021-05-07 RX ADMIN — POTASSIUM CHLORIDE 40 MEQ: 1500 TABLET, EXTENDED RELEASE ORAL at 06:14

## 2021-05-07 RX ADMIN — RIVAROXABAN 15 MG: 15 TABLET, FILM COATED ORAL at 06:12

## 2021-05-07 RX ADMIN — CEFAZOLIN SODIUM 2 G: 2 INJECTION, SOLUTION INTRAVENOUS at 06:09

## 2021-05-07 ASSESSMENT — PAIN DESCRIPTION - PAIN TYPE: TYPE: ACUTE PAIN

## 2021-05-07 NOTE — PROGRESS NOTES
Pt discharged with medication education, follow-up care education. IV removed. Monitor room notified. Charge notified. Transported to home with ambulance.

## 2021-05-07 NOTE — PROGRESS NOTES
Bedside report received from JAMIL Avalos. Pt A&Ox1 to self. Resting comfortably in bed. Fall precautions in place. Bed alarm on and working appropriately. Call light within reach. Will continue to monitor.

## 2021-05-07 NOTE — DISCHARGE PLANNING
Received Transport Form @ 8612  Spoke to BERNARDA Cowan @ 0948    Transport is scheduled for 1300 @[facility] going to Home.

## 2021-05-07 NOTE — DISCHARGE SUMMARY
"HOSPITAL MEDICINE DISCHARGE SUMMARY    DATE OF ADMISSION:  4/30/2021    DATE OF DISCHARGE:  5/7/2021    CHIEF COMPLAINT ON ADMISSION  Chief Complaint   Patient presents with   • Fever     onset this am. Chills. muscle aches. nausea.        CODE STATUS  DNAR/DNI    Allergies   Allergen Reactions   • Bloodless Unspecified     Pt states he is willing to accept blood/blood products as \"a last resort\"   • Crestor [Rosuvastatin Calcium] Rash and Itching     Rash/itching all over body   • Eliquis [Apixaban] Rash           • Flomax [Tamsulosin] Unspecified     Pts wife states \"His BP goes really low and he fell down\"   • Heparin Hives, Rash and Itching     Pts wife states \"Rash all over body\".   • Lipitor [Atorvastatin] Rash     Pts wife states \"Rash all over body\".   • Macrobid [Kdc:Red Dye+Yellow Dye+Nitrofurantoin+Brilliant Blue Fcf] Nausea     Pt's wife states \"Makes pt weak and nausea\".   • Statins [Hmg-Coa-R Inhibitors] Rash and Itching     Rash/itching all over body   • Emcin Clear Unspecified         • Unasyn [Ampicillin-Sulbactam Sodium]      Patient developed a rash with Unasyn administration May 2021   • Coufarin [Warfarin] Unspecified     Pts wife states \"He just does not like that drug\".       DISCHARGE PROBLEM LIST  Principal Problem (Resolved):    Bacteremia POA: Unknown  Active Problems:    PAF (paroxysmal atrial fibrillation) ([facility]) POA: Yes  Resolved Problems:    Recurrent UTI POA: Yes      Overview: Infection due to ESBL-producing Escherichia coli     Lactic acidosis POA: Yes    Sepsis ([facility]) POA: Unknown      MEDICATIONS ON DISCHARGE     Medication List      START taking these medications      Instructions   levoFLOXacin 750 MG tablet  Commonly known as: Levaquin   Take 1 tablet by mouth every day for 5 days.  Dose: 750 mg        CONTINUE taking these medications      Instructions   ascorbic acid 500 MG tablet  Commonly known as: Vitamin C   Take 500 mg by mouth every morning.  Dose: 500 mg   "   CALCIUM-MAGNESIUM-ZINC PO   Take 1 tablet by mouth every morning. Unknown OTC Strength.  Dose: 1 tablet     finasteride 5 MG Tabs  Commonly known as: PROSCAR   Take 5 mg by mouth every morning.  Dose: 5 mg     rivaroxaban 15 MG Tabs tablet  Commonly known as: Xarelto   Take 1 tablet by mouth with dinner.  Dose: 15 mg     VITAMIN A-BETA CAROTENE PO   Take 1 tablet by mouth every Monday, Wednesday, and Friday.  Dose: 1 tablet     VITAMIN B-12 PO   Take 1 tablet by mouth every morning. Unknown OTC Strength.  Dose: 1 tablet     vitamin D 2000 UNIT Tabs   Take 2,000 Units by mouth every morning.  Dose: 2,000 Units     VITAMIN-B COMPLEX PO   Take 1 tablet by mouth every Tuesday and Thursday. Unknown OTC Strength.  Dose: 1 tablet            CONSULTATIONS  Infectious Disease  Gastroenterology  [facility]    HPI & HOSPITAL COURSE  Mr. Tavares is a 89 yo man who presented with chief complaint of fever associated with chills.  According to his wife, the patient was doing well until the morning of presentation.  Initially, there was concern for UTI, so the patient was admitted to the hospital for treatment with intravenous antibiotic.  He has a history of atrial fibrillation anticoagulate with Xarelto, which was continue during this admission.  His sepsis was treated with aggressive hydration and intravenous antibiotic for the underlying source of infection.  His blood culture ultimately grew Streptococcus gallolyticus, formerly known as strep bovis.  Due to the speciation, there was concern for GI source given that the association of the bowel is with underlying gastrointestinal malignancy.  Infectious disease was consulted to guide antibiotic.  Per their recommendation, gastroenterology was consulted for evaluation for colonoscopy.    When I assumed the patient's care, I spoke to his wife and him at bedside regarding the diagnostic options.  The patient's and his wife did not want to perceive colonoscopy.  In my  multiple discussions with her regarding their decisions, she expressed that her  and her does not want him to be suffering from pain because they would not want any additional chemoradiation or surgery if malignancy in the gastrointestinal tract was found.  I confirmed their wishes during multiple occasion.  His wife wishes to continue with medical treatment, specifically antibiotic, which was directed by Dr. Holman, infectious disease specialist.    Palliative care was consulted to assist with goals of care and advanced care planning.  I also discussed with the patient and his wife regarding options for discharge, hospice benefits and what it means, home health services, etc.  His wife goal is to take the patient home to his home environment, where she feels that he is the most comfortable.  Ultimately, in discussion with palliative care, his wife elected to enroll Alistair in hospice.  [facility] has [facility] the patient case.    Therefore, he is discharged in fair and stable condition to hospice.  Oral antibiotic as recommended by infectious disease after discharge as per his wife's wishes.    The patient met 2-midnight criteria for an inpatient stay at the time of discharge.    SPECIFIC OUTPATIENT FOLLOW-UP RECOMMENDATIONS  None    FOLLOW UP  [address] [id number] [phone]          DIET  Resume home diet previous to admission    ACTIVITY  Bedrest.    PROCEDURES  None      PERTINENT RESULTS  Blood culture is positive for strep bovis.      Total time of the discharge process exceeds 40 minutes.

## 2021-05-07 NOTE — DISCHARGE PLANNING
Meds-to-Beds: Discharge prescription orders listed below delivered to patient's bedside. JAMIL Quesada notified. Written information regarding the dispensed prescriptions was provided to the patient including the phone number of the pharmacy to call for any questions. Patient elected to have co-payment billed to patient account.      Fljabari Alistair Gamble   Home Medication Instructions YANET:[id number]    Printed on:05/07/21 1151   Medication Information                      levoFLOXacin (LEVAQUIN) 750 MG tablet  Take 1 tablet by mouth every day for 5 days.                 Bernice Diaz, PharmD

## 2021-05-07 NOTE — PROGRESS NOTES
2 RN skin check complete for Jose score of 16.   Devices in place PIV.  Skin assessed under devices intact.  Confirmed pressure ulcers found on none.  New potential pressure ulcers noted on none. Wound consult placed NA.  The following interventions in place q2hr turns with encouragement to reposition, barrier cream in use. Bilat heel mepilex on.   Sacrum intact, pink and blanching.   Generalized bruising.

## 2021-05-07 NOTE — PROGRESS NOTES
Bedside report received from JAMIL Murillo. Call light and belongings within reach. Bed locked and in lowest position. Alarm and fall precautions in place.

## 2021-05-07 NOTE — DISCHARGE INSTRUCTIONS
Discharge Instructions    Discharged to home by ambulance with escort. Discharged via ambulance, hospital escort: Yes.  Special equipment needed: Not Applicable and Cane    Be sure to schedule a follow-up appointment with your primary care doctor or any specialists as instructed.     Discharge Plan:   Diet Plan: Discussed  Activity Level: Discussed  Confirmed Follow up Appointment: Patient to Call and Schedule Appointment  Confirmed Symptoms Management: Discussed  Medication Reconciliation Updated: Yes    I understand that a diet low in cholesterol, fat, and sodium is recommended for good health. Unless I have been given specific instructions below for another diet, I accept this instruction as my diet prescription.   Other diet: minced & moist    Special Instructions: None    · Is patient discharged on Warfarin / Coumadin?   No     Depression / Suicide Risk    As you are discharged from this [facility] facility, it is important to learn how to keep safe from harming yourself.    Recognize the warning signs:  · Abrupt changes in personality, positive or negative- including increase in energy   · Giving away possessions  · Change in eating patterns- significant weight changes-  positive or negative  · Change in sleeping patterns- unable to sleep or sleeping all the time   · Unwillingness or inability to communicate  · Depression  · Unusual sadness, discouragement and loneliness  · Talk of wanting to die  · Neglect of personal appearance   · Rebelliousness- reckless behavior  · Withdrawal from people/activities they love  · Confusion- inability to concentrate     If you or a loved one observes any of these behaviors or has concerns about self-harm, here's what you can do:  · Talk about it- your feelings and reasons for harming yourself  · Remove any means that you might use to hurt yourself (examples: pills, rope, extension cords, firearm)  · Get professional help from the community (Mental Health, Substance Abuse,  psychological counseling)  · Do not be alone:Call your Safe Contact- someone whom you trust who will be there for you.  · Call your local CRISIS HOTLINE 928-6394 or 938-921-1158  · Call your local Children's Mobile Crisis Response Team [place] (244) 608-4081 or www.[employer]  · Call the toll free National Suicide Prevention Hotlines   · National Suicide Prevention Lifeline 124-330-NSFA (0080)  · National Hope Line Network 800-SUICIDE (067-2453)

## 2021-05-07 NOTE — CARE PLAN
Problem: Communication  Goal: The ability to communicate needs accurately and effectively will improve  Outcome: PROGRESSING SLOWER THAN EXPECTED  Note: Call light within reach     Problem: Safety  Goal: Will remain free from injury  Outcome: PROGRESSING SLOWER THAN EXPECTED  Note:   Fall precautions in place. Bed in lowest position. Non-skid socks in place. Personal possessions within reach. Mobility sign on door. Bed-alarm on. Call light within reach. Pt educated regarding fall prevention and states understanding.        Problem: Knowledge Deficit  Goal: Knowledge of disease process/condition, treatment plan, diagnostic tests, and medications will improve  Outcome: PROGRESSING SLOWER THAN EXPECTED  Note: Plan of care discussed

## 2021-05-07 NOTE — CARE PLAN
Problem: Safety  Goal: Will remain free from falls  Outcome: PROGRESSING AS EXPECTED   Fall precautions in place. Bed alarm on and working appropriately. Call light within reach.   Problem: Knowledge Deficit  Goal: Knowledge of disease process/condition, treatment plan, diagnostic tests, and medications will improve  Outcome: PROGRESSING AS EXPECTED   Pt A&Ox1 to self. Pt reoriented, all questions answered at this time. POC discussed with pt. Pt unable to verbalize understanding. Requiring reinforcement for learning.

## 2021-05-14 NOTE — DOCUMENTATION QUERY
[facility]                                                                       Query Response Note      PATIENT:               JEREL GROSS  ACCT #:                  [id number]  MRN:                     [id number]  :                      10/23/1924  ADMIT DATE:       2021 9:20 AM  DISCH DATE:        2021 1:43 PM  RESPONDING  PROVIDER #:        [id number]           QUERY TEXT:    Please clarify in documentation the relationship, if any, between urinary tract infection and urinary catheter    [email or web address]    The patient's Clinical Indicators include:  Patient admitted with sepsis and UTI, patient self catheterizes.  PN 5/3, assessing recurrent UTI likely induced by self-catheterization.  Clinical indicators:  sepsis, self catheterization, recurrent uti, bacteremia  treatment/monitoring:  urine cultures, iv antibiotics, labs  risks:  sepsis, organ dysfunction  Options provided:   -- Urinary tract infection is due to or associated with catheterization   -- Unrelated to each other   -- Unable to determine      Query created by: Kika Hernandez on 2021 1:46 PM    RESPONSE TEXT:    Urinary tract infection is due to or associated with catheterization          Electronically signed by:  Dougie Fernandez MD 2021 6:28 AM

## 2021-06-01 NOTE — DOCUMENTATION QUERY
[facility]                                                                       Query Response Note      PATIENT:               JEREL GROSS  ACCT #:                  [id number]  MRN:                     [id number]  :                      10/23/1924  ADMIT DATE:       2021 9:20 AM  DISCH DATE:        2021 1:43 PM  RESPONDING  PROVIDER #:        [id number]           QUERY TEXT:    Please clarify the source of sepsis if known:    NOTE:  If an appropriate response is not listed below, please respond with a new note.    [email or web address]      The patient's Clinical Indicators include:  Patient with Enterococcus Sepsis, POA. Initally sepsis was linked to UTI source, but D/C summary seems to suspect GI source as well.   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 Fib    Treatment/monitoring:  cultures, IV antibiotics     Risks: Colon Cancer, organ dysfunction  Options provided:   -- Sepsis is due to or associated with Urinary Tract Infection and GI source   -- Sepsis is due to or associated with Urinary Tract Infection   -- Sepsis is due to or associated with GI source (please specify)   -- Unable to determine      Query created by: Kika Hernandez on 2021 10:19 AM    RESPONSE TEXT:    Sepsis is due to or associated with GI source (please specify)          Electronically signed by:  Dougie Fernandez MD 2021 10:35 AM

## 2021-07-16 NOTE — CONSULTS
Reason for PC Consult: Advance Care Planning    Consulted by: Dr. Mohamud    Assessment:  General: Per MARIA M Alejandro note Pt is a 89 yo male PMH CAD, Paroxysmal Atrial fibrillation, CVA on Xarelto, severe aortic stenosis-not a surgical candidate, urinary retention requiring self catheterizations, recurrent UTI's who was admitted to the hospital 4/30/31 with fevers, chills, generalized weakness, bodyaches for a few days prior to admission. Blood cultures: Strep. gallolyticus bacteremia. Recommendations for colonoscopy due to streptocococcus gallolyticus bacteremia which has a correlation with colon neoplasm. Patient has never had a colonoscopy. Completed a barium enema study and Artiemarielena stated that she would not want him to have any further invasive procedures including the colonoscopy. Pt is frail and BMI 19.11 with impaired motor control, balance, ambulation secondary to CVA.     Social: Pt lives with his wife of 26 yrs they met in the [facility] and moved to [place]. 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 She stated that he has had home health in the past. Rekha has family in [place] but Alistair does not have any living relatives.      Consults: Infectious disease and Gastroenterology    Dyspnea: No-    Last BM: 05/01/21(per report)-    Pain: No-    Depression: No-    Dementia: No;       Spiritual:  Is Synagogue or spirituality important for coping with this illness? Yes- Was seen by    Has a  or spiritual provider visit been requested? No    Palliative Performance Scale: 50%    Advance Directive: None-    DPOA:  -  None NOK is Rekha Tavares pt's wife.   POLST: No-      Code Status: Full-      Outcome:  Met with Rekha at pt's bedside. Alistair was sleeping, just had the barium enema study and Rekha stated he  "was tired due to the procedure. PC RN and Rekha met outside pt's room to let Alistair rest. Introduced self and role of Palliative care. Rekha has been Natasha's primary caregiver and enjoys supporting him. She stated that she understands that he is frail and tires quickly especially after his stroke last year. Rekha understood Kenny current medical situation and able to talk about her hopes for the goals moving forward.     Explored pt's values, beliefs, and preferences in order to identify GOC. Rekha stated that when Alistair come into the hospital he is confused and then they have to give him medications due to being unsafe. She stated that at home he does not have issues. She has to provide help for all his ADL's and cooking of meals. She walks with him and they go out to the park, pt is able to walk short distances but tires easily and she pushes him in a transport chair. Pt has not been eating or drinking much since being in the hospital and has sundowning episodes at night. PC RN understood that Rekha does not want a colonoscopy but is waiting on the result from the barium enema test. She wants to gather information to better make decisions. PC RN asked if Alistair ever completed a Advance directive and she stated \"no, I have avoided those kinds of conversations.\"  Rekha talked about their life together and how they met. She expressed that being so far from her family they have depended on each other for everything. Rekha became tearful talking with PC RN about the next steps. This RN described hospice as well as the Skilled nursing and home health care. Rekha wanted to wait for the results from the current test and then make a choice. Addressed pt's current code status and education provided about CPR and also mechanical ventilation. Rekha verbalized her understanding and needed some time to process conversation. She was ok with RN coming back on 5/5 to meet with her again to talk " about code status.     No other questions/concerns at this time. While talking with Rekha PC RN provided therapeutic communication, including open ended questions, reflective listening and normalization of thoughts and feelings through out encounter. PC contact information provided and encouraged to call with any questions.     Recommendations: I do not recommend an ethics or hospice consult at this time because wife is still considering her options and would like to wait for test results from current procedure. .    Updated:  team    Plan: Follow up with Rekha 5/5/2021 about code status. Continue to follow and provide support for [facility]    Thank you for allowing Palliative Care to participate in this patient's care. Please feel free to call [phone] with any questions or concerns.   normal...

## 2023-11-05 NOTE — PROGRESS NOTES
Infectious Disease Progress Note    Author: Deja Holman M.D. Date & Time of service: 2021  9:16 AM    Chief Complaint:  Follow-up for Streptococcus bacteremia    Interval History:   patient remains afebrile, white count down to 14.4, he is resting comfortably this morning, per wife no acute events overnight and no issues with new antibiotics  5/3 afebrile WBC 9.7 patient reportedly developed a rash yesterday and thus antibiotics were changed to Ancef and Flagyl.  Blood cultures on  are negative to date.  Wife at bedside states that her  was agitated and was moving around during TTE.  She also would prefer that colonoscopy be performed in the hospital as the colonoscopy prep would be difficult on an outpatient basis.  Per wife, patient has never had a colonoscopy in the past   afebrile WBC7.7 repeat TTE was a poor study, plan for barium enema and if abnormal, GI will plan on colonoscopy per notes.  Wife at bedside however does not want any further invasive procedures including a colonoscopy or JAYMIE.  She is agreeable to antibiotics      Labs Reviewed and Medications Reviewed.    Review of Systems:  Review of Systems   Unable to perform ROS: Mental status change       Hemodynamics:  Temp (24hrs), Av.4 °C (97.6 °F), Min:36.1 °C (97 °F), Max:36.9 °C (98.4 °F)  Temperature: 36.1 °C (97 °F)  Pulse  Av.3  Min: 66  Max: 111   Blood Pressure : 131/83       Physical Exam:  Physical Exam  Vitals and nursing note reviewed.   Constitutional:       Comments: Elderly and frail appearing   HENT:      Mouth/Throat:      Mouth: Mucous membranes are dry.      Pharynx: No oropharyngeal exudate.   Eyes:      Pupils: Pupils are equal, round, and reactive to light.   Cardiovascular:      Rate and Rhythm: Normal rate and regular rhythm.   Pulmonary:      Effort: Pulmonary effort is normal. No respiratory distress.      Breath sounds: No stridor.   Abdominal:      General: There is no distension.       Tenderness: There is no abdominal tenderness.   Musculoskeletal:         General: No tenderness.      Cervical back: No rigidity.   Skin:     Findings: No erythema or rash.   Neurological:      Comments: Confused, follows simple commands   Psychiatric:      Comments: Unable to assess         Meds:    Current Facility-Administered Medications:   •  NS  •  [Held by provider] rivaroxaban  •  QUEtiapine  •  polyethylene glycol/lytes  •  senna-docusate  •  diphenhydrAMINE-zinc acetate  •  ceFAZolin  •  metroNIDAZOLE (FLAGYL) IV  •  famotidine  •  haloperidol lactate  •  LORazepam  •  finasteride  •  acetaminophen  •  Notify provider if pain remains uncontrolled **AND** Use the Numeric Rating Scale (NRS), Cortez-Baker Faces (WBF), or FLACC on regular floors and Critical-Care Pain Observation Tool (CPOT) on ICUs/Trauma to assess pain **AND** Pulse Ox **AND** Pharmacy Consult Request **AND** If patient difficult to arouse and/or has respiratory depression (respiratory rate of 10 or less), stop any opiates that are currently infusing and call a Rapid Response.  •  oxyCODONE immediate-release **OR** oxyCODONE immediate-release **OR** HYDROmorphone  •  ondansetron  •  ondansetron    Labs:  Recent Labs     05/02/21 0344 05/03/21  0604 05/04/21 0349   WBC 14.4* 9.7 7.7   RBC 3.31* 3.24* 3.40*   HEMOGLOBIN 7.8* 7.6* 7.9*   HEMATOCRIT 25.4* 24.9* 26.5*   MCV 76.7* 76.9* 77.9*   MCH 23.6* 23.5* 23.2*   RDW 47.6 45.8 48.7   PLATELETCT 291 288 286   MPV 9.7 9.5 9.9   NEUTSPOLYS 78.00* 81.00* 91.20*   LYMPHOCYTES 7.00* 6.60* 2.60*   MONOCYTES 12.50 9.00 3.50   EOSINOPHILS 1.30 2.80 2.70   BASOPHILS 0.70 0.20 0.00   RBCMORPHOLO  --   --  Present     Recent Labs     05/02/21 0344 05/03/21  0604 05/04/21 0349   SODIUM 139 132* 136   POTASSIUM 3.7 3.4* 3.9   CHLORIDE 104 102 106   CO2 25 25 20   GLUCOSE 103* 114* 85   BUN 18 19 16     Recent Labs     05/02/21  0344 05/03/21  0604 05/04/21  0349   ALBUMIN 3.4 3.3 2.9*   TBILIRUBIN 0.6  0.6 0.6   ALKPHOSPHAT 167* 126* 118*   TOTPROTEIN 6.8 6.4 5.7*   ALTSGPT 44 40 35   ASTSGOT 60* 51* 54*   CREATININE 0.74 0.84 0.65       Imaging:  CT-ABDOMEN-PELVIS WITH    Result Date: 5/1/2021 5/1/2021 7:25 PM HISTORY/REASON FOR EXAM:  Sepsis. TECHNIQUE/EXAM DESCRIPTION:   Contiguous axial images were obtained from the diaphragmatic domes to the pubic symphysis following intravenous contrast administration. Coronal and sagittal reformats were generated and reviewed. 100 mL of Omnipaque 350 nonionic contrast was administered without complication. Low dose optimization technique was utilized for this CT exam including automated exposure control and adjustment of the mA and/or kV according to patient size. COMPARISON: 8/4/2012 FINDINGS: Lower chest: Small right and trace left pleural effusions and associated atelectasis. Cardiomegaly. Right atrial enlargement. Liver: Nutmeg liver. No hepatic mass lesions are detected. No intrahepatic biliary dilatation. Gallbladder: No mural thickening. No radiopaque gallstones. Common bile duct: Nondilated. Pancreas: Atrophic. No pancreatic mass lesions are detected. Spleen: No mass. Adrenals: No mass. Kidneys: No suspicious mass lesions. No hydronephrosis. Stomach, small bowel, colon: No bowel wall thickening or obstruction. Peritoneal cavity: No ascites. Lymph nodes: No enlarged nodes by size criteria. Aorta: No aneurysm. Atherosclerosis. Pelvic organs: Trabeculated bladder wall and multiple bladder diverticula. Prostatic calcifications. Musculoskeletal structures: Osteopenia. Spondylosis.     1.  No acute inflammatory process in the abdomen or pelvis. 2.  Heterogeneous perfusion of the liver, most likely secondary to right heart dysfunction. 3.  Cardiomegaly and right atrial enlargement. 4.  Small right and trace left pleural effusions and associated atelectasis. 5.  Trabeculated bladder wall and multiple bladder diverticula, related to outlet obstruction.    CT-HEAD  W/O    Result Date: 4/30/2021 4/30/2021 9:57 AM HISTORY/REASON FOR EXAM:  Altered mental status TECHNIQUE/EXAM DESCRIPTION AND NUMBER OF VIEWS:  CT scan of the head without contrast. Contiguous 5 mm axial sections were obtained from the skull base through the vertex. Up to date radiation dose reduction adjustments have been utilized to meet ALARA standards for radiation dose reduction. COMPARISON: 6/6/2020 FINDINGS:     No acute hemorrhage, mass-effect, or midline shift.   There is diffuse atrophy.   Periventricular white matter changes are consistent with chronic small vessel disease. There is encephalomalacia in the left temporal lobe. There is encephalomalacia in the right frontal lobe.  Ventricles and cisterns are patent. No ischemia or intracranial mass is identified. The paranasal sinuses and mastoid air cells are clear.     1.  No acute intracranial process. 2. Diffuse atrophy and periventricular white matter changes consistent with chronic small vessel disease. Encephalomalacia in the right frontal and left temporal lobes    DX-CHEST-PORTABLE (1 VIEW)    Result Date: 4/30/2021 4/30/2021 10:23 AM HISTORY/REASON FOR EXAM:  Sepsis; sepsis. TECHNIQUE/EXAM DESCRIPTION AND NUMBER OF VIEWS: Single portable view of the chest. COMPARISON: 6/6/2020 FINDINGS: Cardiomediastinal silhouette is stable. Aortic calcified atherosclerotic plaque. Mild interstitial prominence diffusely could represent vascular congestion and/or chronic changes. Hazy left basilar opacity could represent atelectasis, scarring and/or pneumonitis. No significant pleural effusion or pneumothorax. Generalized osteopenia.     Left basilar opacity which could represent atelectasis and/or pneumonitis. Mild interstitial opacities most likely represent chronic changes and/or mild vascular congestion.    [id number]    Result Date: 5/1/2021 5/1/2021 3:00 PM HISTORY/REASON FOR EXAM:  Jaw Pain. TECHNIQUE/EXAM DESCRIPTION AND NUMBER OF VIEWS:  1  shortness of breath/panic attack "view(s) of the mandible. COMPARISON:  None. FINDINGS: Poor dentition, with multiple absent teeth. Multiple dental fillings. Periapical lucency is seen involving one of the left mandibular premolars. No mandibular fracture.     1.  Poor dentition with multiple absent teeth. 2.  Periapical lucency is seen involving one of the left mandibular premolars. 3.  No mandibular fracture.      Micro:  Results     Procedure Component Value Units Date/Time    BLOOD CULTURE [[id number]] Collected: 05/02/21 0344    Order Status: Completed Specimen: Blood from Peripheral Updated: 05/03/21 0725     Significant Indicator NEG     Source BLD     Site PERIPHERAL     Culture Result No Growth  Note: Blood cultures are incubated for 5 days and  are monitored continuously.Positive blood cultures  are called to the RN and reported as soon as  they are identified.      Narrative:      Per Hospital Policy: Only change Specimen Src: to \"Line\" if  specified by physician order.  Right AC    BLOOD CULTURE [[id number]] Collected: 05/02/21 0343    Order Status: Completed Specimen: Blood from Peripheral Updated: 05/03/21 0725     Significant Indicator NEG     Source BLD     Site PERIPHERAL     Culture Result No Growth  Note: Blood cultures are incubated for 5 days and  are monitored continuously.Positive blood cultures  are called to the RN and reported as soon as  they are identified.      Narrative:      Per Hospital Policy: Only change Specimen Src: to \"Line\" if  specified by physician order.  Left AC    BLOOD CULTURE [[id number]]  (Abnormal) Collected: 04/30/21 0940    Order Status: Completed Specimen: Blood from Peripheral Updated: 05/02/21 0803     Significant Indicator POS     Source BLD     Site PERIPHERAL     Culture Result Growth detected by Bactec instrument. 04/30/2021  21:11      -  Streptococcus gallolyticus  See previous culture for sensitivity report.      Narrative:      CALL  [facility]  183 tel. [phone],  CALLED  183 tel. [phone] " "04/30/2021, 21:11, RB PERF. RESULTS CALLED TO: RN  [id number]  Per Hospital Policy: Only change Specimen Src: to \"Line\" if  specified by physician order.  Right AC    URINE CULTURE(NEW) [[id number]] Collected: 04/30/21 1655    Order Status: Completed Specimen: Urine Updated: 05/02/21 0741     Significant Indicator NEG     Source UR     Site -     Culture Result No growth at 48 hours.    Narrative:      Indication for culture:->Emergency Room Patient  Indication for culture:->Emergency Room Patient    BLOOD CULTURE [[id number]]  (Abnormal) Collected: 04/30/21 0950    Order Status: Completed Specimen: Blood from Peripheral Updated: 05/01/21 1057     Significant Indicator POS     Source BLD     Site PERIPHERAL     Culture Result Growth detected by Bactec instrument. 04/30/2021  21:08      Streptococcus species  Streptococcus gallolyticus  Susceptibilities in progress      Narrative:      CALL  [facility]  183 tel. [phone],  CALLED  183 tel. [phone] 04/30/2021, 21:11, RB PERF. RESULTS CALLED TO: RN  [phone]  Per Hospital Policy: Only change Specimen Src: to \"Line\" if  specified by physician order.  Right AC    CoV-2 and Flu A/B by PCR (24 hour In-House): Collect NP swab in [facility] [[id number]] Collected: 04/30/21 1045    Order Status: Completed Specimen: Respirate from Nasopharyngeal Updated: 04/30/21 2001     Influenza virus A RNA Negative     Influenza virus B, PCR Negative     SARS-CoV-2 by PCR NotDetected     Comment: PATIENTS: Important information regarding your results and instructions can  be found at [email or web address]   \"After your  Covid-19 Test\"  [facility] providers: PLEASE REFER TO DE-ESCALATION AND RETESTING PROTOCOL  on [email or web address]  **The Roche SARS-CoV-2 RT-PCR test has been made available for use under the  Emergency Use Authorization (EUA) only.          SARS-CoV-2 Source NP Swab    Narrative:      Have you been in close contact with a person who is suspected  or known to be positive " for COVID-19 within the last 30 days  (e.g. last seen that person < 30 days ago)->No    URINALYSIS [[id number]]  (Abnormal) Collected: 04/30/21 1655    Order Status: Completed Specimen: Urine Updated: 04/30/21 1745     Color Yellow     Character Cloudy     Specific Gravity 1.014     Ph 6.5     Glucose Negative mg/dL      Ketones Negative mg/dL      Protein Negative mg/dL      Bilirubin Negative     Urobilinogen, Urine 0.2     Nitrite Positive     Leukocyte Esterase Large     Occult Blood Small     Micro Urine Req Microscopic    Narrative:      Indication for culture:->Emergency Room Patient    Influenza By PCR, A/B [[id number]] Collected: 04/30/21 0000    Order Status: Canceled Specimen: Respirate from Nasopharyngeal           Assessment:  Alistair Tavares is a 89 y.o. male with known history of CAD, paroxysmal A. fib and CVA on Xarelto, severe aortic stenosis deemed not a surgical candidate given frailty, reported history of recurrent UTIs, presented on 4/30 with fevers and chills for the prior few days along with generalized weakness and body aches, found to have Strep gallolyticus bacteremia with positive blood cultures on 4/30/2021     Pertinent Diagnoses:  Sepsis, improving  Streptococcus gallolyticus bacteremia  Asymptomatic bacteriuria  Severe aortic stenosis, deemed not a surgical candidate  Paroxysmal A. Fib  Chronic anticoagulation   CAD  History of CVA    Plan:  -IV Unasyn discontinued on 5/2 secondary development of a rash.  -Continue IV cefazolin 2 g every 8 hours and Flagyl 100 mg 3 times daily while inpatient  -Repeat blood cultures x2 on 5/2-negative to date  -CT abdomen and pelvis with no abscesses  -Strep gallolyticus (formerly bovis) is a GI organism, associated with colon cancer.  Recommend colonoscopy.  Patient's wife would prefer that colonoscopy be performed in the hospital as outpatient prep would be difficult.  Patient's wife now deferring any invasive procedures including colonoscopy and  JAYMIE  -Evaluated by GI-plan for barium enema.  If abnormal, GI will proceed with colonoscopy  -TTE-technically difficult study and incomplete information obtained however was noted to have densely calcified aortic valve leaflets (patient's wife states that her  was very agitated during the procedure).    -Repeat TTE on 5/3 poor study  -As TTEs were poor studies, recommend JAYMIE to definitively rule out infective endocarditis.  However given patient's age and neurological status, patient may not be a candidate for a JAYMIE.  Wife also now deferring JAYMIE  -Plan for 10-day course of antibiotics from 5/2.  Stop date 5/12/2021  -At discharge, can transition patient to p.o. levofloxacin 750 mg daily to complete his antibiotic course  -EKG with QTc interval 418      I have performed a physical exam and reviewed and updated ROS and plan today 5/4/2021.  In review of yesterday's note 5/3/2021, there are no changes except as documented above.    No ID clinic follow-up needed    Plan of care discussed with hospitalist, Dr. Fernandez.  ID signing off.

## 2024-07-19 NOTE — TELEPHONE ENCOUNTER
Per request of [facility] STAT request was sent to Cardiology for pre-op cardiac clearance.    Seeing cardiology in a few weeks, can discuss valve disease and plan of care during that appointment.    Your patient has been flagged through our Valve Net program with the following echocardiogram results:     Moderate-Severe Aortic Stenosis     We recommend yearly echocardiogram surveillance within our valve program.     If appropriate for your patient, please place Referral to [facility].     Please let us know if you have any questions about this program. Our number is [phone].     Yady Sanchez DNP, Cardiology APRN, AGACNP-BC
